# Patient Record
Sex: FEMALE | Race: WHITE | NOT HISPANIC OR LATINO | Employment: OTHER | URBAN - METROPOLITAN AREA
[De-identification: names, ages, dates, MRNs, and addresses within clinical notes are randomized per-mention and may not be internally consistent; named-entity substitution may affect disease eponyms.]

---

## 2017-01-30 DIAGNOSIS — M81.0 OSTEOPOROSIS: Primary | ICD-10-CM

## 2017-01-31 LAB
BUN SERPL-MCNC: 21 MG/DL (ref 8–23)
BUN/CREAT SERPL: 28.8 (ref 7–25)
CALCIUM SERPL-MCNC: 9.7 MG/DL (ref 8.6–10.5)
CHLORIDE SERPL-SCNC: 100 MMOL/L (ref 98–107)
CO2 SERPL-SCNC: 32.4 MMOL/L (ref 22–29)
CREAT SERPL-MCNC: 0.73 MG/DL (ref 0.57–1)
GLUCOSE SERPL-MCNC: 236 MG/DL (ref 65–99)
POTASSIUM SERPL-SCNC: 4.4 MMOL/L (ref 3.5–5.2)
SODIUM SERPL-SCNC: 141 MMOL/L (ref 136–145)

## 2017-02-02 DIAGNOSIS — R73.01 IFG (IMPAIRED FASTING GLUCOSE): Primary | ICD-10-CM

## 2017-02-03 LAB
BUN SERPL-MCNC: 20 MG/DL (ref 8–27)
BUN/CREAT SERPL: 27 (ref 11–26)
CALCIUM SERPL-MCNC: 9.5 MG/DL (ref 8.7–10.3)
CHLORIDE SERPL-SCNC: 101 MMOL/L (ref 96–106)
CO2 SERPL-SCNC: 26 MMOL/L (ref 18–29)
CREAT SERPL-MCNC: 0.75 MG/DL (ref 0.57–1)
GLUCOSE SERPL-MCNC: 109 MG/DL (ref 65–99)
HBA1C MFR BLD: 5.9 % (ref 4.8–5.6)
POTASSIUM SERPL-SCNC: 4.2 MMOL/L (ref 3.5–5.2)
SODIUM SERPL-SCNC: 144 MMOL/L (ref 134–144)

## 2017-02-08 DIAGNOSIS — M81.0 OSTEOPOROSIS: Primary | ICD-10-CM

## 2017-02-09 ENCOUNTER — HOSPITAL ENCOUNTER (OUTPATIENT)
Dept: INFUSION THERAPY | Facility: HOSPITAL | Age: 82
Discharge: HOME | End: 2017-02-09
Admitting: INTERNAL MEDICINE

## 2017-02-09 VITALS
RESPIRATION RATE: 20 BRPM | HEIGHT: 64 IN | HEART RATE: 77 BPM | DIASTOLIC BLOOD PRESSURE: 55 MMHG | WEIGHT: 109 LBS | TEMPERATURE: 97.3 F | OXYGEN SATURATION: 97 % | BODY MASS INDEX: 18.61 KG/M2 | SYSTOLIC BLOOD PRESSURE: 131 MMHG

## 2017-02-09 DIAGNOSIS — M81.0 OSTEOPOROSIS: ICD-10-CM

## 2017-02-09 PROCEDURE — 25010000002 DENOSUMAB 60 MG/ML SOLUTION: Performed by: INTERNAL MEDICINE

## 2017-02-09 PROCEDURE — 96401 CHEMO ANTI-NEOPL SQ/IM: CPT

## 2017-02-09 RX ADMIN — DENOSUMAB 60 MG: 60 INJECTION SUBCUTANEOUS at 14:49

## 2017-02-09 NOTE — PATIENT INSTRUCTIONS
Denosumab injection  What is this medicine?  DENOSUMAB (den oh zeyad mab) slows bone breakdown. Prolia is used to treat osteoporosis in women after menopause and in men. Xgeva is used to prevent bone fractures and other bone problems caused by cancer bone metastases. Xgeva is also used to treat giant cell tumor of the bone.  This medicine may be used for other purposes; ask your health care provider or pharmacist if you have questions.  What should I tell my health care provider before I take this medicine?  They need to know if you have any of these conditions:  -dental disease  -eczema  -infection or history of infections  -kidney disease or on dialysis  -low blood calcium or vitamin D  -malabsorption syndrome  -scheduled to have surgery or tooth extraction  -taking medicine that contains denosumab  -thyroid or parathyroid disease  -an unusual reaction to denosumab, other medicines, foods, dyes, or preservatives  -pregnant or trying to get pregnant  -breast-feeding  How should I use this medicine?  This medicine is for injection under the skin. It is given by a health care professional in a hospital or clinic setting.  If you are getting Prolia, a special MedGuide will be given to you by the pharmacist with each prescription and refill. Be sure to read this information carefully each time.  For Prolia, talk to your pediatrician regarding the use of this medicine in children. Special care may be needed. For Xgeva, talk to your pediatrician regarding the use of this medicine in children. While this drug may be prescribed for children as young as 13 years for selected conditions, precautions do apply.  Overdosage: If you think you have taken too much of this medicine contact a poison control center or emergency room at once.  NOTE: This medicine is only for you. Do not share this medicine with others.  What if I miss a dose?  It is important not to miss your dose. Call your doctor or health care professional if you are  unable to keep an appointment.  What may interact with this medicine?  Do not take this medicine with any of the following medications:  -other medicines containing denosumab  This medicine may also interact with the following medications:  -medicines that suppress the immune system  -medicines that treat cancer  -steroid medicines like prednisone or cortisone  This list may not describe all possible interactions. Give your health care provider a list of all the medicines, herbs, non-prescription drugs, or dietary supplements you use. Also tell them if you smoke, drink alcohol, or use illegal drugs. Some items may interact with your medicine.  What should I watch for while using this medicine?  Visit your doctor or health care professional for regular checks on your progress. Your doctor or health care professional may order blood tests and other tests to see how you are doing.  Call your doctor or health care professional if you get a cold or other infection while receiving this medicine. Do not treat yourself. This medicine may decrease your body's ability to fight infection.  You should make sure you get enough calcium and vitamin D while you are taking this medicine, unless your doctor tells you not to. Discuss the foods you eat and the vitamins you take with your health care professional.  See your dentist regularly. Brush and floss your teeth as directed. Before you have any dental work done, tell your dentist you are receiving this medicine.  Do not become pregnant while taking this medicine or for 5 months after stopping it. Women should inform their doctor if they wish to become pregnant or think they might be pregnant. There is a potential for serious side effects to an unborn child. Talk to your health care professional or pharmacist for more information.  What side effects may I notice from receiving this medicine?  Side effects that you should report to your doctor or health care professional as soon as  possible:  -allergic reactions like skin rash, itching or hives, swelling of the face, lips, or tongue  -breathing problems  -chest pain  -fast, irregular heartbeat  -feeling faint or lightheaded, falls  -fever, chills, or any other sign of infection  -muscle spasms, tightening, or twitches  -numbness or tingling  -skin blisters or bumps, or is dry, peels, or red  -slow healing or unexplained pain in the mouth or jaw  -unusual bleeding or bruising  Side effects that usually do not require medical attention (Report these to your doctor or health care professional if they continue or are bothersome.):  -muscle pain  -stomach upset, gas  This list may not describe all possible side effects. Call your doctor for medical advice about side effects. You may report side effects to FDA at 8-125-FDA-6727.  Where should I keep my medicine?  This medicine is only given in a clinic, doctor's office, or other health care setting and will not be stored at home.  NOTE: This sheet is a summary. It may not cover all possible information. If you have questions about this medicine, talk to your doctor, pharmacist, or health care provider.     © 2016, Elsevier/Gold Standard. (2013-06-17 12:37:47)  Osteoporosis  Osteoporosis is the thinning and loss of density in the bones. Osteoporosis makes the bones more brittle, fragile, and likely to break (fracture). Over time, osteoporosis can cause the bones to become so weak that they fracture after a simple fall. The bones most likely to fracture are the bones in the hip, wrist, and spine.  CAUSES   The exact cause is not known.  RISK FACTORS  Anyone can develop osteoporosis. You may be at greater risk if you have a family history of the condition or have poor nutrition. You may also have a higher risk if you are:   · Female.    · 50 years old or older.  · A smoker.  · Not physically active.    · White or .  · Slender.  SIGNS AND SYMPTOMS   A fracture might be the first sign of the  disease, especially if it results from a fall or injury that would not usually cause a bone to break. Other signs and symptoms include:   · Low back and neck pain.  · Stooped posture.  · Height loss.  DIAGNOSIS   To make a diagnosis, your health care provider may:  · Take a medical history.  · Perform a physical exam.  · Order tests, such as:    A bone mineral density test.    A dual-energy X-ray absorptiometry test.  TREATMENT   The goal of osteoporosis treatment is to strengthen your bones to reduce your risk of a fracture. Treatment may involve:  · Making lifestyle changes, such as:    Eating a diet rich in calcium.    Doing weight-bearing and muscle-strengthening exercises.    Stopping tobacco use.    Limiting alcohol intake.  · Taking medicine to slow the process of bone loss or to increase bone density.  · Monitoring your levels of calcium and vitamin D.  HOME CARE INSTRUCTIONS  · Include calcium and vitamin D in your diet. Calcium is important for bone health, and vitamin D helps the body absorb calcium.  · Perform weight-bearing and muscle-strengthening exercises as directed by your health care provider.  · Do not use any tobacco products, including cigarettes, chewing tobacco, and electronic cigarettes. If you need help quitting, ask your health care provider.  · Limit your alcohol intake.  · Take medicines only as directed by your health care provider.  · Keep all follow-up visits as directed by your health care provider. This is important.  · Take precautions at home to lower your risk of falling, such as:    Keeping rooms well lit and clutter free.    Installing safety rails on stairs.    Using rubber mats in the bathroom and other areas that are often wet or slippery.  SEEK IMMEDIATE MEDICAL CARE IF:   You fall or injure yourself.      This information is not intended to replace advice given to you by your health care provider. Make sure you discuss any questions you have with your health care  provider.     Document Released: 09/27/2006 Document Revised: 01/08/2016 Document Reviewed: 05/28/2015  [employer] Interactive Patient Education ©2016 [employer].

## 2017-03-01 DIAGNOSIS — R73.01 IFG (IMPAIRED FASTING GLUCOSE): ICD-10-CM

## 2017-03-01 DIAGNOSIS — M81.0 OSTEOPOROSIS: ICD-10-CM

## 2017-03-01 DIAGNOSIS — Z00.00 HEALTHCARE MAINTENANCE: Primary | ICD-10-CM

## 2017-03-07 LAB
25(OH)D3+25(OH)D2 SERPL-MCNC: 31.1 NG/ML (ref 30–100)
ALBUMIN SERPL-MCNC: 4.1 G/DL (ref 3.5–5.2)
ALBUMIN/GLOB SERPL: 1.5 G/DL
ALP SERPL-CCNC: 67 U/L (ref 39–117)
ALT SERPL-CCNC: 20 U/L (ref 1–33)
APPEARANCE UR: CLEAR
AST SERPL-CCNC: 22 U/L (ref 1–32)
BACTERIA #/AREA URNS HPF: NORMAL /HPF
BASOPHILS # BLD AUTO: 0.03 10*3/MM3 (ref 0–0.2)
BASOPHILS NFR BLD AUTO: 0.5 % (ref 0–1.5)
BILIRUB SERPL-MCNC: 0.5 MG/DL (ref 0.1–1.2)
BILIRUB UR QL STRIP: NEGATIVE
BUN SERPL-MCNC: 19 MG/DL (ref 8–23)
BUN/CREAT SERPL: 26 (ref 7–25)
CALCIUM SERPL-MCNC: 9.7 MG/DL (ref 8.6–10.5)
CHLORIDE SERPL-SCNC: 100 MMOL/L (ref 98–107)
CO2 SERPL-SCNC: 31 MMOL/L (ref 22–29)
COLOR UR: YELLOW
CREAT SERPL-MCNC: 0.73 MG/DL (ref 0.57–1)
EOSINOPHIL # BLD AUTO: 0.18 10*3/MM3 (ref 0–0.7)
EOSINOPHIL NFR BLD AUTO: 2.9 % (ref 0.3–6.2)
EPI CELLS #/AREA URNS HPF: NORMAL /HPF
ERYTHROCYTE [DISTWIDTH] IN BLOOD BY AUTOMATED COUNT: 14.1 % (ref 11.7–13)
GLOBULIN SER CALC-MCNC: 2.8 GM/DL
GLUCOSE SERPL-MCNC: 113 MG/DL (ref 65–99)
GLUCOSE UR QL: NEGATIVE
HBA1C MFR BLD: 5.8 % (ref 4.8–5.6)
HCT VFR BLD AUTO: 45.9 % (ref 35.6–45.5)
HGB BLD-MCNC: 14.7 G/DL (ref 11.9–15.5)
HGB UR QL STRIP: NEGATIVE
IMM GRANULOCYTES # BLD: 0 10*3/MM3 (ref 0–0.03)
IMM GRANULOCYTES NFR BLD: 0 % (ref 0–0.5)
KETONES UR QL STRIP: NEGATIVE
LEUKOCYTE ESTERASE UR QL STRIP: NEGATIVE
LYMPHOCYTES # BLD AUTO: 1.52 10*3/MM3 (ref 0.9–4.8)
LYMPHOCYTES NFR BLD AUTO: 24.9 % (ref 19.6–45.3)
MCH RBC QN AUTO: 30.2 PG (ref 26.9–32)
MCHC RBC AUTO-ENTMCNC: 32 G/DL (ref 32.4–36.3)
MCV RBC AUTO: 94.4 FL (ref 80.5–98.2)
MICRO URNS: NORMAL
MICRO URNS: NORMAL
MONOCYTES # BLD AUTO: 0.53 10*3/MM3 (ref 0.2–1.2)
MONOCYTES NFR BLD AUTO: 8.7 % (ref 5–12)
MUCOUS THREADS URNS QL MICRO: PRESENT /HPF
NEUTROPHILS # BLD AUTO: 3.85 10*3/MM3 (ref 1.9–8.1)
NEUTROPHILS NFR BLD AUTO: 63 % (ref 42.7–76)
NITRITE UR QL STRIP: NEGATIVE
PH UR STRIP: 6.5 [PH] (ref 5–7.5)
PLATELET # BLD AUTO: 226 10*3/MM3 (ref 140–500)
POTASSIUM SERPL-SCNC: 4.2 MMOL/L (ref 3.5–5.2)
PROT SERPL-MCNC: 6.9 G/DL (ref 6–8.5)
PROT UR QL STRIP: NEGATIVE
RBC # BLD AUTO: 4.86 10*6/MM3 (ref 3.9–5.2)
RBC #/AREA URNS HPF: NORMAL /HPF
SODIUM SERPL-SCNC: 142 MMOL/L (ref 136–145)
SP GR UR: 1.01 (ref 1–1.03)
URINALYSIS REFLEX: NORMAL
UROBILINOGEN UR STRIP-MCNC: 0.2 MG/DL (ref 0.2–1)
WBC # BLD AUTO: 6.11 10*3/MM3 (ref 4.5–10.7)
WBC #/AREA URNS HPF: NORMAL /HPF

## 2017-03-13 ENCOUNTER — OFFICE VISIT (OUTPATIENT)
Dept: INTERNAL MEDICINE | Facility: CLINIC | Age: 82
End: 2017-03-13

## 2017-03-13 VITALS
SYSTOLIC BLOOD PRESSURE: 115 MMHG | BODY MASS INDEX: 19.63 KG/M2 | RESPIRATION RATE: 12 BRPM | HEIGHT: 64 IN | OXYGEN SATURATION: 97 % | DIASTOLIC BLOOD PRESSURE: 70 MMHG | HEART RATE: 92 BPM | WEIGHT: 115 LBS

## 2017-03-13 DIAGNOSIS — Z00.00 HEALTHCARE MAINTENANCE: Primary | ICD-10-CM

## 2017-03-13 DIAGNOSIS — F41.1 GAD (GENERALIZED ANXIETY DISORDER): ICD-10-CM

## 2017-03-13 DIAGNOSIS — R09.89 GLOBUS SENSATION: ICD-10-CM

## 2017-03-13 DIAGNOSIS — L82.1 SEBORRHEIC KERATOSES: ICD-10-CM

## 2017-03-13 DIAGNOSIS — H04.123 DRY EYES, BILATERAL: ICD-10-CM

## 2017-03-13 DIAGNOSIS — F43.23 ADJUSTMENT DISORDER WITH MIXED ANXIETY AND DEPRESSED MOOD: ICD-10-CM

## 2017-03-13 DIAGNOSIS — J31.0 CHRONIC RHINITIS: ICD-10-CM

## 2017-03-13 DIAGNOSIS — M81.0 OSTEOPOROSIS: ICD-10-CM

## 2017-03-13 DIAGNOSIS — H91.93 HEARING LOSS, BILATERAL: ICD-10-CM

## 2017-03-13 DIAGNOSIS — N39.3 STRESS INCONTINENCE IN FEMALE: ICD-10-CM

## 2017-03-13 DIAGNOSIS — B35.1 ONYCHOMYCOSIS: ICD-10-CM

## 2017-03-13 DIAGNOSIS — R73.01 IFG (IMPAIRED FASTING GLUCOSE): ICD-10-CM

## 2017-03-13 DIAGNOSIS — L29.9 PRURITUS: ICD-10-CM

## 2017-03-13 DIAGNOSIS — L85.3 XEROSIS OF SKIN: ICD-10-CM

## 2017-03-13 PROCEDURE — G0439 PPPS, SUBSEQ VISIT: HCPCS | Performed by: INTERNAL MEDICINE

## 2017-03-13 PROCEDURE — 99214 OFFICE O/P EST MOD 30 MIN: CPT | Performed by: INTERNAL MEDICINE

## 2017-03-13 RX ORDER — AMMONIUM LACTATE 12 G/100G
LOTION TOPICAL AS NEEDED
Qty: 225 G | Refills: 1
Start: 2017-03-13 | End: 2019-05-13

## 2017-03-13 RX ORDER — FLUTICASONE PROPIONATE 50 MCG
2 SPRAY, SUSPENSION (ML) NASAL DAILY
Qty: 1 EACH | Refills: 1
Start: 2017-03-13 | End: 2017-04-12

## 2017-03-13 NOTE — PROGRESS NOTES
"Annual Exam (review of medical issues )      HPI  Cristy Wilde is a 82 y.o. female RTC in yearly AWV, review of medical issues:   1. Anxiety with element of adjustment d/o - remains on Lexapro. Pt has been sleeping well, so well that she was able to stop her melatonin.  \"I am sleeping much better. As soon as my head hits that pillow I am out\".  No use of Ativan.  2. Chronic rhinitis and globus sensation - sx improved after ENT eval showed no secondary etiology on nasal scope. However, pt notes is does still persist at this point. \"Nose runs so much, it is terrible\". Wonders if Restassis is contributing.   Not using any nasal sprays other than occasional saline.  Never use Flonase or related meds.   3. Onychomycossis - Rx for Kerydin topical at last visit, but pt never got Rx as \"I did not get in to all that.  Thinks has 2 \"dead toenails\"  And wants those looked at today.   4. Hearing Loss - on audiology eval noted at ENT office.  Does not think it has progressed. Children note to pt that she has issues with hearing. Pt is concerned about cost of hearing aides and finds the variety of options overwhelming.  5. Osteoporosis - is s/p 5 Prolia shots, last on 2/9/17.  On Ca++D daily.  No exercise but is active caring for  at NH.  6. Dry Eye Syndrome - on Restasis per optho. Feels like not seeing as well and plans to make appt with optho.   7. IFG - long hx with strong FHx of DMII. Is worried about this issue. Is only sibiling who does not have DM.  Wants to know if should have a blood sugar monitor.   8. Diffuse pruritis - long hx and feels like issue persists.  Predated Lexapro use. Q3 day Zyrtec still helps a bit.  Issue can occur random times on arms or legs or in scalp.       Review of Systems   Constitution: Negative for weight gain and weight loss.   HENT: Positive for hoarse voice (occasional with mucous noted). Negative for congestion, headaches and sore throat.    Eyes: Positive for blurred vision. " "Negative for discharge, double vision, pain and redness.        Plans upcoming optho appt.;  Wears glasses.    Cardiovascular: Negative for chest pain, dyspnea on exertion, leg swelling, near-syncope and syncope.   Respiratory: Negative for cough.    Skin: Positive for dry skin (\"it has just been awful. My skin is so dry\". ), itching (variable and diffuse. ) and suspicious lesions (on temple, lesion getting bigger, on L side. Had excised in past. ).   Musculoskeletal: Negative for arthritis, joint pain, joint swelling, muscle cramps and muscle weakness.   Gastrointestinal: Positive for change in bowel habit (in last month has had stool urgency with all loose or watery stool. Last PM was a bit formed by back to watery and loose this AM. ) and diarrhea (wonders if related to magnesium.  Started magnesium about the time of stool issue onset. ). Negative for abdominal pain, constipation (noted in past, long hx), heartburn, nausea and vomiting.   Genitourinary: Positive for bladder incontinence (progressive stress issues with less exercise). Negative for dysuria, frequency and hematuria.   Neurological: Negative for dizziness and light-headedness.   Psychiatric/Behavioral: Negative for depression. The patient is nervous/anxious. The patient does not have insomnia.        Problem List:    Patient Active Problem List   Diagnosis   • Chronic rhinitis   • Dry eyes   • Globus sensation   • Hearing loss   • Osteoporosis   • Adjustment disorder with mixed anxiety and depressed mood   • Onychomycosis   • Healthcare maintenance   • IFG (impaired fasting glucose)   • JESUS (generalized anxiety disorder)   • Pruritus   • Seborrheic keratoses   • Stress incontinence in female   • Xerosis of skin       Medical History:    Past Medical History   Diagnosis Date   • Chronic constipation    • Chronic rhinitis      with noted vasomotor rhinitis   • Diverticulosis    • Dry eyes      sees dr norton   • Globus sensation    • Hearing loss     " " noted on 2015 audiology testing   • History of allergic rhinitis    • History of onychomycosis    • Internal hemorrhoids      grade 2   • Laryngitis, chronic    • Osteoarthritis, chronic      minimal sx controlled with exercise   • Osteoporosis    • Pancreatitis due to biliary obstruction 2013     relieved after lap lacho   • Postpartum depression    • Prediabetes    • Reaction, adjustment, with anxious, depressed mood         Social History:    Social History     Social History   • Marital status:      Spouse name: N/A   • Number of children: 2   • Years of education: N/A     Occupational History   • Homemaker      Social History Main Topics   • Smoking status: Never Smoker   • Smokeless tobacco: Never Used   • Alcohol use Yes      Comment: < 1 drinks/ month   • Drug use: No   • Sexual activity: No      Comment:  only     Other Topics Concern   • Not on file     Social History Narrative    Diet - \"Try to make it as healthy as possible\"; eats fruits and veggies    Exercise - None       Family History:   Family History   Problem Relation Age of Onset   • Diabetes Mother    • Heart disease Mother    • Depression Father    • Parkinsonism Father    • Diabetes Sister    • Alzheimer's disease Brother    • Diabetes Brother    • Diabetes Maternal Grandmother    • Diabetes Brother    • Prostate cancer Brother    • Diabetes Sister        Surgical History:   Past Surgical History   Procedure Laterality Date   • Cataract extraction     • Cholecystectomy  2013   • Tonsillectomy  1957   • Dilation and curettage, diagnostic / therapeutic  1986   • Tear duct surgery       2007 R and 2014 L; Dr. Davis   • Cataract extraction bilateral w/ anterior vitrectomy  2009   • Refractive surgery       2015 L and 2016 R   • Eye ptosis repair Bilateral 2015         Current Outpatient Prescriptions:   •  B Complex Vitamins (VITAMIN B COMPLEX) tablet, Take  by mouth., Disp: , Rfl:   •  calcium-vitamin D (OSCAL) 250-125 MG-UNIT " per tablet, Take  by mouth., Disp: , Rfl:   •  cetirizine (ZyrTEC) 10 MG tablet, Take 10 mg by mouth daily., Disp: , Rfl:   •  denosumab (PROLIA) 60 MG/ML solution syringe, Inject  under the skin 1 (One) Time. EVERY 6 MONTHS, Disp: , Rfl:   •  escitalopram (LEXAPRO) 10 MG tablet, one pill PO daily., Disp: 90 tablet, Rfl: 3  •  MULTIPLE VITAMINS PO, Take  by mouth., Disp: , Rfl:   •  naproxen sodium (ALEVE) 220 MG tablet, Take 220 mg by mouth 2 (two) times a day as needed for mild pain (1-3)., Disp: , Rfl:   •  Omega-3 Fatty Acids (FISH OIL) 1200 MG capsule capsule, Take  by mouth., Disp: , Rfl:   •  ammonium lactate (AMLACTIN) 12 % lotion, Apply  topically As Needed for Dry Skin., Disp: 225 g, Rfl: 1  •  fluticasone (FLONASE) 50 MCG/ACT nasal spray, 2 sprays into each nostril Daily for 30 days. Administer 2 sprays in each nostril daily, Disp: 1 each, Rfl: 1    Vitals:    03/13/17 1318   BP: 115/70   Pulse: 92   Resp: 12   SpO2: 97%       Physical Exam   Constitutional: She is oriented to person, place, and time. She appears well-developed and well-nourished. She does not have a sickly appearance. She does not appear ill. No distress.   HENT:   Head: Normocephalic and atraumatic.   Right Ear: Hearing, tympanic membrane, external ear and ear canal normal.   Left Ear: Hearing, tympanic membrane, external ear and ear canal normal.   Nose: Nose normal.   Mouth/Throat: Uvula is midline, oropharynx is clear and moist and mucous membranes are normal.   Eyes: Conjunctivae, EOM and lids are normal. Pupils are equal, round, and reactive to light.   Neck: Trachea normal, normal range of motion and full passive range of motion without pain. Neck supple. Carotid bruit is not present. No thyroid mass and no thyromegaly present.   Cardiovascular: Normal rate, regular rhythm, S1 normal, S2 normal, normal heart sounds and intact distal pulses.  Exam reveals no gallop and no friction rub.    No murmur heard.  Pulses:       Radial  pulses are 2+ on the right side, and 2+ on the left side.        Dorsalis pedis pulses are 2+ on the right side, and 2+ on the left side.        Posterior tibial pulses are 2+ on the right side, and 2+ on the left side.   Pulmonary/Chest: Effort normal and breath sounds normal. No respiratory distress. She has no wheezes. She has no rhonchi. She has no rales. She exhibits no mass. Right breast exhibits no inverted nipple, no mass, no nipple discharge and no skin change. Left breast exhibits no inverted nipple, no mass, no nipple discharge and no skin change.   Chaperone present     Abdominal: Soft. Normal appearance and bowel sounds are normal. She exhibits no distension and no mass. There is no hepatosplenomegaly. There is no tenderness. There is no rebound and no guarding.   Genitourinary: Rectal exam shows external hemorrhoid (non-thrombosed). There is no rash, tenderness, lesion or injury on the right labia. There is no rash, tenderness, lesion or injury on the left labia.   Genitourinary Comments: Chaperone present     Musculoskeletal: Normal range of motion. She exhibits no edema.       Vascular Status -  Her exam exhibits right foot vasculature abnormal and no right foot edema. Her exam exhibits left foot vasculature abnormal and no left foot edema.  Lymphadenopathy:     She has no cervical adenopathy.     She has no axillary adenopathy.        Right: No inguinal adenopathy present.        Left: No inguinal adenopathy present.   Neurological: She is alert and oriented to person, place, and time. She has normal strength and normal reflexes. No cranial nerve deficit or sensory deficit.   Skin: Skin is warm and dry. No rash noted.        Psychiatric: She has a normal mood and affect. Her behavior is normal. Cognition and memory are normal.   Vitals reviewed.      Assessment/ Plan  Diagnoses and all orders for this visit:    Healthcare maintenance    Seborrheic keratoses    Pruritus  -     ammonium lactate  (AMLACTIN) 12 % lotion; Apply  topically As Needed for Dry Skin.    Osteoporosis    Onychomycosis    IFG (impaired fasting glucose)    Hearing loss, bilateral    Globus sensation    Dry eyes, bilateral    Chronic rhinitis  -     fluticasone (FLONASE) 50 MCG/ACT nasal spray; 2 sprays into each nostril Daily for 30 days. Administer 2 sprays in each nostril daily    Adjustment disorder with mixed anxiety and depressed mood    JESUS (generalized anxiety disorder)    Stress incontinence in female    Xerosis of skin  -     ammonium lactate (AMLACTIN) 12 % lotion; Apply  topically As Needed for Dry Skin.        Return in about 1 year (around 3/13/2018) for Annual physical.      Discussion:  Cristy Wilde is a 82 y.o. female RTC in yearly AWV, review of medical issues:   1. Anxiety with element of adjustment d/o - markedly improved sx on Lexapro, though still seems anxious. Pt perseverates to a degree on the stressors she is having with 's NH and [facility] admin office.  Regardless, pt is sleeping better an d is pleased with progress.    2. Chronic rhinitis and globus sensation - s/p ENT eval in past showing no secondary etiology on nasal scope.  Sx persist and I think we should at least try simple maneuvers like trial of Flonase daily.  C/W nasal saline rinses daily.   3. Onychomycossis - Rx for Kerydin topical at last visit, but pt never got Rx.  Pt prefers to stick with Gold Ascencio topical lotion as feels it is helping.   4. Hearing Loss - on audiology eval, declines aides at this time.   5. Osteoporosis - is s/p 5 Prolia shots, last on 2/9/17.  C/W Ca++D daily.  Repeat DEXA 3/2017.   6. Dry Eye Syndrome - on Restasis per optho.    7. IFG - A1C stable on labs, pt c/w TLC diet. D/W pt at length concept of carbs/ simple sugars and managing overall carb intake.  Monitor.  8. Diffuse pruritis with xerosis- predated Lexapro use. I really suspect this is dry skin related based on exam and I have asked pt to start AmLactin  cream BID.  Call if not better.  Labs do not reveal secondary cause and TSH OK in 2016.       9. Stress Incontinence - progressive as pt off exercise and Kegel's.  U/A clear. Restart Kegel's.   10. Seborrheic keratoses - on back, reassured pt.   11. HM - labs d/w pt: Flu/ Td/ Pvax/ Prevnar/ Zostavax - UTD; Mammo due, pt to schedule; Pap D/C'd, declines bimanual today; C-scope 2011 (-) --> due with hx polyps, pt agrees to schedule; DEXA as above; restart exercise for anxiety relief; Preventative care plan provided to pt at end of visit    RTC 1 year AWV, F labs prior

## 2017-03-13 NOTE — PATIENT INSTRUCTIONS
Medicare Wellness  Personal Prevention Plan of Service     Date of Office Visit:  2017  Encounter Provider:  Jerardo Kaur MD  Place of Service:  [facility] INTERNAL MEDICINE  Patient Name: Cristy Wilde  :  1934    As part of the Medicare Wellness portion of your visit today, we are providing you with this personalized preventive plan of services (PPPS). This plan is based upon recommendations of the [place] Preventive Services Task Force (USPSTF) and the Advisory Committee on Immunization Practices (ACIP).    This lists the preventive care services that should be considered, and provides dates of when you are due. Items listed as completed are up-to-date and do not require any further intervention.    Health Maintenance   Topic Date Due   • TDAP/TD VACCINES (1 - Tdap) 2018 (Originally 2008)   • PNEUMOCOCCAL VACCINES (65+ LOW/MEDIUM RISK) (2 of 2 - PPSV23) 2025 (Originally 11/15/2016)   • DXA SCAN  03/10/2018   • MEDICARE ANNUAL WELLNESS  2018   • MAMMOGRAM  2018   • INFLUENZA VACCINE  Completed   • ZOSTER VACCINE  Completed

## 2017-04-03 ENCOUNTER — TELEPHONE (OUTPATIENT)
Dept: INTERNAL MEDICINE | Facility: CLINIC | Age: 82
End: 2017-04-03

## 2017-04-03 NOTE — TELEPHONE ENCOUNTER
OK to trial 1/2 tablet. I worry this could be worse anxiety > side effect issue, but for now will see how she does on 1/2 tablet.

## 2017-04-03 NOTE — TELEPHONE ENCOUNTER
Pt called and stated that she is having sx on the lexapro. She has been very tired and shaky, she is wanting to know if she can take a half of a tablet.

## 2017-04-18 ENCOUNTER — TELEPHONE (OUTPATIENT)
Dept: INTERNAL MEDICINE | Facility: CLINIC | Age: 82
End: 2017-04-18

## 2017-04-18 DIAGNOSIS — M79.605 LEG PAIN, LEFT: Primary | ICD-10-CM

## 2017-05-17 ENCOUNTER — TELEPHONE (OUTPATIENT)
Dept: INTERNAL MEDICINE | Facility: CLINIC | Age: 82
End: 2017-05-17

## 2017-05-17 DIAGNOSIS — M54.50 CHRONIC MIDLINE LOW BACK PAIN WITHOUT SCIATICA: Primary | ICD-10-CM

## 2017-05-17 DIAGNOSIS — G89.29 CHRONIC MIDLINE LOW BACK PAIN WITHOUT SCIATICA: Primary | ICD-10-CM

## 2017-05-18 PROBLEM — G89.29 CHRONIC MIDLINE LOW BACK PAIN WITHOUT SCIATICA: Status: ACTIVE | Noted: 2017-05-18

## 2017-05-18 PROBLEM — M54.50 CHRONIC MIDLINE LOW BACK PAIN WITHOUT SCIATICA: Status: ACTIVE | Noted: 2017-05-18

## 2017-08-02 DIAGNOSIS — M81.0 OSTEOPOROSIS: Primary | ICD-10-CM

## 2017-08-05 LAB
BUN SERPL-MCNC: 21 MG/DL (ref 8–23)
BUN/CREAT SERPL: 26.9 (ref 7–25)
CALCIUM SERPL-MCNC: 10.3 MG/DL (ref 8.6–10.5)
CHLORIDE SERPL-SCNC: 103 MMOL/L (ref 98–107)
CO2 SERPL-SCNC: 29.5 MMOL/L (ref 22–29)
CREAT SERPL-MCNC: 0.78 MG/DL (ref 0.57–1)
GLUCOSE SERPL-MCNC: 161 MG/DL (ref 65–99)
POTASSIUM SERPL-SCNC: 4.2 MMOL/L (ref 3.5–5.2)
SODIUM SERPL-SCNC: 145 MMOL/L (ref 136–145)

## 2017-08-14 ENCOUNTER — HOSPITAL ENCOUNTER (OUTPATIENT)
Dept: INFUSION THERAPY | Facility: HOSPITAL | Age: 82
Discharge: HOME | End: 2017-08-14
Admitting: INTERNAL MEDICINE

## 2017-08-14 VITALS
OXYGEN SATURATION: 100 % | BODY MASS INDEX: 20.2 KG/M2 | DIASTOLIC BLOOD PRESSURE: 50 MMHG | RESPIRATION RATE: 16 BRPM | HEIGHT: 63 IN | HEART RATE: 69 BPM | WEIGHT: 114 LBS | TEMPERATURE: 95.4 F | SYSTOLIC BLOOD PRESSURE: 133 MMHG

## 2017-08-14 DIAGNOSIS — M81.0 OSTEOPOROSIS: ICD-10-CM

## 2017-08-14 PROCEDURE — 96372 THER/PROPH/DIAG INJ SC/IM: CPT

## 2017-08-14 PROCEDURE — 25010000002 DENOSUMAB 60 MG/ML SOLUTION: Performed by: INTERNAL MEDICINE

## 2017-08-14 RX ORDER — CYCLOSPORINE 0.5 MG/ML
1 EMULSION OPHTHALMIC 2 TIMES DAILY
COMMUNITY

## 2017-08-14 RX ORDER — LORATADINE 10 MG/1
1 CAPSULE, LIQUID FILLED ORAL DAILY
COMMUNITY
End: 2019-05-13

## 2017-08-14 RX ORDER — CHOLECALCIFEROL (VITAMIN D3) 125 MCG
5 CAPSULE ORAL NIGHTLY
COMMUNITY
End: 2018-02-15

## 2017-08-14 RX ORDER — MULTIVITAMIN WITH IRON
1 TABLET ORAL NIGHTLY
COMMUNITY

## 2017-08-14 RX ADMIN — DENOSUMAB 60 MG: 60 INJECTION SUBCUTANEOUS at 14:02

## 2017-08-14 NOTE — PATIENT INSTRUCTIONS
Denosumab injection  What is this medicine?  DENOSUMAB (den oh zeyad mab) slows bone breakdown. Prolia is used to treat osteoporosis in women after menopause and in men. Xgeva is used to prevent bone fractures and other bone problems caused by cancer bone metastases. Xgeva is also used to treat giant cell tumor of the bone.  This medicine may be used for other purposes; ask your health care provider or pharmacist if you have questions.  COMMON BRAND NAME(S): Prolia, XGEVA  What should I tell my health care provider before I take this medicine?  They need to know if you have any of these conditions:  -dental disease  -eczema  -infection or history of infections  -kidney disease or on dialysis  -low blood calcium or vitamin D  -malabsorption syndrome  -scheduled to have surgery or tooth extraction  -taking medicine that contains denosumab  -thyroid or parathyroid disease  -an unusual reaction to denosumab, other medicines, foods, dyes, or preservatives  -pregnant or trying to get pregnant  -breast-feeding  How should I use this medicine?  This medicine is for injection under the skin. It is given by a health care professional in a hospital or clinic setting.  If you are getting Prolia, a special MedGuide will be given to you by the pharmacist with each prescription and refill. Be sure to read this information carefully each time.  For Prolia, talk to your pediatrician regarding the use of this medicine in children. Special care may be needed. For Xgeva, talk to your pediatrician regarding the use of this medicine in children. While this drug may be prescribed for children as young as 13 years for selected conditions, precautions do apply.  Overdosage: If you think you have taken too much of this medicine contact a poison control center or emergency room at once.  NOTE: This medicine is only for you. Do not share this medicine with others.  What if I miss a dose?  It is important not to miss your dose. Call your doctor  or health care professional if you are unable to keep an appointment.  What may interact with this medicine?  Do not take this medicine with any of the following medications:  -other medicines containing denosumab  This medicine may also interact with the following medications:  -medicines that suppress the immune system  -medicines that treat cancer  -steroid medicines like prednisone or cortisone  This list may not describe all possible interactions. Give your health care provider a list of all the medicines, herbs, non-prescription drugs, or dietary supplements you use. Also tell them if you smoke, drink alcohol, or use illegal drugs. Some items may interact with your medicine.  What should I watch for while using this medicine?  Visit your doctor or health care professional for regular checks on your progress. Your doctor or health care professional may order blood tests and other tests to see how you are doing.  Call your doctor or health care professional if you get a cold or other infection while receiving this medicine. Do not treat yourself. This medicine may decrease your body's ability to fight infection.  You should make sure you get enough calcium and vitamin D while you are taking this medicine, unless your doctor tells you not to. Discuss the foods you eat and the vitamins you take with your health care professional.  See your dentist regularly. Brush and floss your teeth as directed. Before you have any dental work done, tell your dentist you are receiving this medicine.  Do not become pregnant while taking this medicine or for 5 months after stopping it. Women should inform their doctor if they wish to become pregnant or think they might be pregnant. There is a potential for serious side effects to an unborn child. Talk to your health care professional or pharmacist for more information.  What side effects may I notice from receiving this medicine?  Side effects that you should report to your doctor  or health care professional as soon as possible:  -allergic reactions like skin rash, itching or hives, swelling of the face, lips, or tongue  -breathing problems  -chest pain  -fast, irregular heartbeat  -feeling faint or lightheaded, falls  -fever, chills, or any other sign of infection  -muscle spasms, tightening, or twitches  -numbness or tingling  -skin blisters or bumps, or is dry, peels, or red  -slow healing or unexplained pain in the mouth or jaw  -unusual bleeding or bruising  Side effects that usually do not require medical attention (report to your doctor or health care professional if they continue or are bothersome):  -muscle pain  -stomach upset, gas  This list may not describe all possible side effects. Call your doctor for medical advice about side effects. You may report side effects to FDA at 5-747-FDA-4009.  Where should I keep my medicine?  This medicine is only given in a clinic, doctor's office, or other health care setting and will not be stored at home.  NOTE: This sheet is a summary. It may not cover all possible information. If you have questions about this medicine, talk to your doctor, pharmacist, or health care provider.     © 2017, [employer]. (2017-01-19 10:06:55)

## 2017-09-15 ENCOUNTER — TELEPHONE (OUTPATIENT)
Dept: INTERNAL MEDICINE | Facility: CLINIC | Age: 82
End: 2017-09-15

## 2017-09-15 DIAGNOSIS — H83.2X9 VESTIBULAR DISEQUILIBRIUM, UNSPECIFIED LATERALITY: Primary | ICD-10-CM

## 2017-09-15 NOTE — TELEPHONE ENCOUNTER
Patient is having vestibular issues and would like therapy. Please fax order to [facility] [phone].

## 2017-10-23 ENCOUNTER — TELEPHONE (OUTPATIENT)
Dept: INTERNAL MEDICINE | Facility: CLINIC | Age: 82
End: 2017-10-23

## 2017-10-23 NOTE — TELEPHONE ENCOUNTER
Cristy called asking if she should get the [employer] [facility] Screenings. She gets information in mail and did not know if you felt it was necessary.     Per Dr Kaur he does not feel necessary.  He reviews everything and other appropriate testing at her Yearly exam.

## 2017-12-19 DIAGNOSIS — F41.9 ANXIETY: ICD-10-CM

## 2017-12-19 DIAGNOSIS — F43.23 ADJUSTMENT DISORDER WITH MIXED ANXIETY AND DEPRESSED MOOD: ICD-10-CM

## 2017-12-20 RX ORDER — ESCITALOPRAM OXALATE 10 MG/1
TABLET ORAL
Qty: 90 TABLET | Refills: 1 | Status: SHIPPED | OUTPATIENT
Start: 2017-12-20 | End: 2018-04-11 | Stop reason: SDUPTHER

## 2018-02-06 DIAGNOSIS — M81.0 OSTEOPOROSIS, UNSPECIFIED OSTEOPOROSIS TYPE, UNSPECIFIED PATHOLOGICAL FRACTURE PRESENCE: Primary | ICD-10-CM

## 2018-02-08 LAB
BUN SERPL-MCNC: 23 MG/DL (ref 8–23)
BUN/CREAT SERPL: 30.3 (ref 7–25)
CALCIUM SERPL-MCNC: 9.2 MG/DL (ref 8.6–10.5)
CHLORIDE SERPL-SCNC: 99 MMOL/L (ref 98–107)
CO2 SERPL-SCNC: 30 MMOL/L (ref 22–29)
CREAT SERPL-MCNC: 0.76 MG/DL (ref 0.57–1)
GFR SERPLBLD CREATININE-BSD FMLA CKD-EPI: 73 ML/MIN/1.73
GFR SERPLBLD CREATININE-BSD FMLA CKD-EPI: 88 ML/MIN/1.73
GLUCOSE SERPL-MCNC: 204 MG/DL (ref 65–99)
POTASSIUM SERPL-SCNC: 3.9 MMOL/L (ref 3.5–5.2)
SODIUM SERPL-SCNC: 140 MMOL/L (ref 136–145)

## 2018-02-13 DIAGNOSIS — M81.0 OSTEOPOROSIS, UNSPECIFIED OSTEOPOROSIS TYPE, UNSPECIFIED PATHOLOGICAL FRACTURE PRESENCE: Primary | ICD-10-CM

## 2018-02-14 PROBLEM — M81.0 OSTEOPOROSIS: Status: ACTIVE | Noted: 2018-02-14

## 2018-02-15 ENCOUNTER — HOSPITAL ENCOUNTER (OUTPATIENT)
Dept: INFUSION THERAPY | Facility: HOSPITAL | Age: 83
Discharge: HOME | End: 2018-02-15
Admitting: INTERNAL MEDICINE

## 2018-02-15 VITALS
DIASTOLIC BLOOD PRESSURE: 68 MMHG | OXYGEN SATURATION: 98 % | RESPIRATION RATE: 16 BRPM | HEART RATE: 80 BPM | TEMPERATURE: 97.5 F | SYSTOLIC BLOOD PRESSURE: 117 MMHG

## 2018-02-15 DIAGNOSIS — M80.00XA AGE-RELATED OSTEOPOROSIS WITH CURRENT PATHOLOGICAL FRACTURE, INITIAL ENCOUNTER: ICD-10-CM

## 2018-02-15 PROCEDURE — 25010000002 DENOSUMAB 60 MG/ML SOLUTION: Performed by: INTERNAL MEDICINE

## 2018-02-15 PROCEDURE — 96372 THER/PROPH/DIAG INJ SC/IM: CPT

## 2018-02-15 RX ADMIN — DENOSUMAB 60 MG: 60 INJECTION SUBCUTANEOUS at 13:32

## 2018-03-27 DIAGNOSIS — R73.01 IFG (IMPAIRED FASTING GLUCOSE): ICD-10-CM

## 2018-03-27 DIAGNOSIS — B35.1 ONYCHOMYCOSIS: ICD-10-CM

## 2018-03-27 DIAGNOSIS — Z00.00 HEALTHCARE MAINTENANCE: Primary | ICD-10-CM

## 2018-03-27 DIAGNOSIS — E78.89 LIPIDS ABNORMAL: ICD-10-CM

## 2018-03-27 DIAGNOSIS — M80.00XA AGE-RELATED OSTEOPOROSIS WITH CURRENT PATHOLOGICAL FRACTURE, INITIAL ENCOUNTER: ICD-10-CM

## 2018-03-30 LAB
25(OH)D3+25(OH)D2 SERPL-MCNC: 34.6 NG/ML (ref 30–100)
ALBUMIN SERPL-MCNC: 4.1 G/DL (ref 3.5–5.2)
ALBUMIN/GLOB SERPL: 1.6 G/DL
ALP SERPL-CCNC: 76 U/L (ref 39–117)
ALT SERPL-CCNC: 20 U/L (ref 1–33)
APPEARANCE UR: CLEAR
AST SERPL-CCNC: 21 U/L (ref 1–32)
BACTERIA #/AREA URNS HPF: NORMAL /HPF
BASOPHILS # BLD AUTO: 0.03 10*3/MM3 (ref 0–0.2)
BASOPHILS NFR BLD AUTO: 0.5 % (ref 0–1.5)
BILIRUB SERPL-MCNC: 0.4 MG/DL (ref 0.1–1.2)
BILIRUB UR QL STRIP: NEGATIVE
BUN SERPL-MCNC: 26 MG/DL (ref 8–23)
BUN/CREAT SERPL: 33.8 (ref 7–25)
CALCIUM SERPL-MCNC: 10 MG/DL (ref 8.6–10.5)
CHLORIDE SERPL-SCNC: 100 MMOL/L (ref 98–107)
CHOLEST SERPL-MCNC: 193 MG/DL (ref 0–200)
CO2 SERPL-SCNC: 29 MMOL/L (ref 22–29)
COLOR UR: YELLOW
CREAT SERPL-MCNC: 0.77 MG/DL (ref 0.57–1)
EOSINOPHIL # BLD AUTO: 0.18 10*3/MM3 (ref 0–0.7)
EOSINOPHIL NFR BLD AUTO: 2.9 % (ref 0.3–6.2)
EPI CELLS #/AREA URNS HPF: NORMAL /HPF
ERYTHROCYTE [DISTWIDTH] IN BLOOD BY AUTOMATED COUNT: 14.1 % (ref 11.7–13)
GFR SERPLBLD CREATININE-BSD FMLA CKD-EPI: 72 ML/MIN/1.73
GFR SERPLBLD CREATININE-BSD FMLA CKD-EPI: 87 ML/MIN/1.73
GLOBULIN SER CALC-MCNC: 2.6 GM/DL
GLUCOSE SERPL-MCNC: 117 MG/DL (ref 65–99)
GLUCOSE UR QL: NEGATIVE
HCT VFR BLD AUTO: 45.2 % (ref 35.6–45.5)
HDLC SERPL-MCNC: 73 MG/DL (ref 40–60)
HGB BLD-MCNC: 14.4 G/DL (ref 11.9–15.5)
HGB UR QL STRIP: NEGATIVE
IMM GRANULOCYTES # BLD: 0 10*3/MM3 (ref 0–0.03)
IMM GRANULOCYTES NFR BLD: 0 % (ref 0–0.5)
KETONES UR QL STRIP: NEGATIVE
LDLC SERPL CALC-MCNC: 108 MG/DL (ref 0–100)
LEUKOCYTE ESTERASE UR QL STRIP: NEGATIVE
LYMPHOCYTES # BLD AUTO: 1.53 10*3/MM3 (ref 0.9–4.8)
LYMPHOCYTES NFR BLD AUTO: 24.2 % (ref 19.6–45.3)
MCH RBC QN AUTO: 30.3 PG (ref 26.9–32)
MCHC RBC AUTO-ENTMCNC: 31.9 G/DL (ref 32.4–36.3)
MCV RBC AUTO: 95 FL (ref 80.5–98.2)
MICRO URNS: NORMAL
MICRO URNS: NORMAL
MONOCYTES # BLD AUTO: 0.63 10*3/MM3 (ref 0.2–1.2)
MONOCYTES NFR BLD AUTO: 10 % (ref 5–12)
MUCOUS THREADS URNS QL MICRO: PRESENT /HPF
NEUTROPHILS # BLD AUTO: 3.94 10*3/MM3 (ref 1.9–8.1)
NEUTROPHILS NFR BLD AUTO: 62.4 % (ref 42.7–76)
NITRITE UR QL STRIP: NEGATIVE
PH UR STRIP: 6.5 [PH] (ref 5–7.5)
PLATELET # BLD AUTO: 212 10*3/MM3 (ref 140–500)
POTASSIUM SERPL-SCNC: 4.2 MMOL/L (ref 3.5–5.2)
PROT SERPL-MCNC: 6.7 G/DL (ref 6–8.5)
PROT UR QL STRIP: NEGATIVE
RBC # BLD AUTO: 4.76 10*6/MM3 (ref 3.9–5.2)
RBC #/AREA URNS HPF: NORMAL /HPF
SODIUM SERPL-SCNC: 140 MMOL/L (ref 136–145)
SP GR UR: 1.01 (ref 1–1.03)
TRIGL SERPL-MCNC: 62 MG/DL (ref 0–150)
URINALYSIS REFLEX: NORMAL
UROBILINOGEN UR STRIP-MCNC: 0.2 MG/DL (ref 0.2–1)
VLDLC SERPL CALC-MCNC: 12.4 MG/DL (ref 5–40)
WBC # BLD AUTO: 6.31 10*3/MM3 (ref 4.5–10.7)
WBC #/AREA URNS HPF: NORMAL /HPF

## 2018-04-10 ENCOUNTER — OFFICE VISIT (OUTPATIENT)
Dept: INTERNAL MEDICINE | Facility: CLINIC | Age: 83
End: 2018-04-10

## 2018-04-10 VITALS
DIASTOLIC BLOOD PRESSURE: 70 MMHG | TEMPERATURE: 97.9 F | HEIGHT: 63 IN | WEIGHT: 115 LBS | SYSTOLIC BLOOD PRESSURE: 128 MMHG | BODY MASS INDEX: 20.38 KG/M2 | HEART RATE: 80 BPM | RESPIRATION RATE: 12 BRPM | OXYGEN SATURATION: 98 %

## 2018-04-10 DIAGNOSIS — F41.1 GAD (GENERALIZED ANXIETY DISORDER): ICD-10-CM

## 2018-04-10 DIAGNOSIS — Z12.31 ENCOUNTER FOR SCREENING MAMMOGRAM FOR MALIGNANT NEOPLASM OF BREAST: ICD-10-CM

## 2018-04-10 DIAGNOSIS — H04.123 DRY EYES: ICD-10-CM

## 2018-04-10 DIAGNOSIS — F43.23 ADJUSTMENT DISORDER WITH MIXED ANXIETY AND DEPRESSED MOOD: ICD-10-CM

## 2018-04-10 DIAGNOSIS — R73.01 IFG (IMPAIRED FASTING GLUCOSE): ICD-10-CM

## 2018-04-10 DIAGNOSIS — B35.1 ONYCHOMYCOSIS: ICD-10-CM

## 2018-04-10 DIAGNOSIS — L29.9 PRURITUS: ICD-10-CM

## 2018-04-10 DIAGNOSIS — Z00.00 HEALTHCARE MAINTENANCE: Primary | ICD-10-CM

## 2018-04-10 DIAGNOSIS — L82.1 SEBORRHEIC KERATOSES: ICD-10-CM

## 2018-04-10 DIAGNOSIS — M81.0 OSTEOPOROSIS, UNSPECIFIED OSTEOPOROSIS TYPE, UNSPECIFIED PATHOLOGICAL FRACTURE PRESENCE: ICD-10-CM

## 2018-04-10 DIAGNOSIS — J30.0 CHRONIC VASOMOTOR RHINITIS: ICD-10-CM

## 2018-04-10 DIAGNOSIS — N39.3 STRESS INCONTINENCE IN FEMALE: ICD-10-CM

## 2018-04-10 DIAGNOSIS — H91.93 BILATERAL HEARING LOSS, UNSPECIFIED HEARING LOSS TYPE: ICD-10-CM

## 2018-04-10 PROBLEM — M79.605 LEG PAIN, LEFT: Status: RESOLVED | Noted: 2017-04-18 | Resolved: 2018-04-10

## 2018-04-10 PROCEDURE — 99214 OFFICE O/P EST MOD 30 MIN: CPT | Performed by: INTERNAL MEDICINE

## 2018-04-10 PROCEDURE — G0439 PPPS, SUBSEQ VISIT: HCPCS | Performed by: INTERNAL MEDICINE

## 2018-04-10 NOTE — PATIENT INSTRUCTIONS
Check at pharmacy on Tdap (tetanus with the whooping cough booster) for coverage.   Ask about Shingrix (shingles vaccine) at pharmacy.     Medicare Wellness  Personal Prevention Plan of Service     Date of Office Visit:  04/10/2018  Encounter Provider:  Jerardo Kaur MD  Place of Service:  [facility]  Patient Name: Cristy Wilde  :  1934    As part of the Medicare Wellness portion of your visit today, we are providing you with this personalized preventive plan of services (PPPS). This plan is based upon recommendations of the [place] Preventive Services Task Force (USPSTF) and the Advisory Committee on Immunization Practices (ACIP).    This lists the preventive care services that should be considered, and provides dates of when you are due. Items listed as completed are up-to-date and do not require any further intervention.    Health Maintenance   Topic Date Due   • TDAP/TD VACCINES (1 - Tdap) 2008   • DXA SCAN  03/10/2018   • MAMMOGRAM  2018   • PNEUMOCOCCAL VACCINES (65+ LOW/MEDIUM RISK) (2 of 2 - PPSV23) 2025 (Originally 11/15/2016)   • MEDICARE ANNUAL WELLNESS  04/10/2019   • INFLUENZA VACCINE  Completed   • ZOSTER VACCINE  Completed       Orders Placed This Encounter   Procedures   • Mammo Screening Bilateral With CAD     Standing Status:   Future     Standing Expiration Date:   2019     Order Specific Question:   Reason for Exam:     Answer:   Screeening       Return in about 1 year (around 4/10/2019) for Annual physical.

## 2018-04-10 NOTE — PROGRESS NOTES
"Subjective     Chief Complaint   Patient presents with   • Annual Exam     review of medical issues        HPI:  Cristy Wilde is a 83 y.o. female RTC in yearly AWV, review of medical issues:   \"I think I am doing OK\".  Notes stress has been terrible but thinks is doing Ok with it. Has new  and feels like that is helping management of Medicaid issues with her .   1. Anxiety with element of adjustment d/o - on Lexapro and notes \"I think that is why I sleep well\".  Taking med daily, but tried 5mg daily and felt like needed a whole pill and went back to 10mg dosing.  Has some intermittent bowel issues, but have resolved. Will get some intermittent diarrhea and loose stools. \"I am fine now\".  Is exercising at [employer] 3x/ week.    2. Chronic rhinitis and globus sensation - s/p ENT eval in past showing no secondary etiology on nasal scope. Notes clear mucous is better and overall sx are better.  \"I can sing now\".  Notes mucous is less coating on vocal cords and is back to singing.    3. Hearing Loss - on audiology eval, declined aides in past but finally agreed, at children's urging, to get aides. Has some ear itching with them so does not use daily.   Got a topical oil to use in ears, not sure if has helped or now.   Notes it was note helpful, \"they drive me goofy, I just itch\".    4. Osteoporosis - is s/p 7 Prolia shots, on Ca++D daily.  Feels like doing well, other than itching after shot.   5. Dry Eye Syndrome - on Restasis per optho.  Still working for pt.  \"I think it would be worse if I did not have it\". Has upcoming appt, sees once yearly.   6. IFG - A1C stable on labs, pt c/w TLC diet. Notes in past. Pt is exercising 3x/ week now and doing well.  Diet is overall good, but admits was not like it used to be when cooked for  and herself.  Is active in yard. 'I am still very active'.    7. Diffuse pruritis with xerosis- predated Lexapro use.  Noted it was better after Prolia shot.  Thinks that " "\"non-drowsy allergy pill\" OTC and really helps.  Itching gets worst after shot, 'oh I can tell'.          The following portions of the patient's history were reviewed and updated as appropriate: allergies, current medications, past family history, past medical history, past social history, past surgical history and problem list.    Review of Systems   Constitution: Negative for chills, fever, malaise/fatigue, weight gain and weight loss.   HENT: Positive for hoarse voice (largely improved. ). Negative for congestion, odynophagia and sore throat.    Eyes: Negative for discharge, double vision, pain and redness.        Last optho appt ~4/2018; wears glasses     Cardiovascular: Positive for leg swelling (ankles have been swelling, intermittent.  single e). Negative for chest pain, dyspnea on exertion, irregular heartbeat, near-syncope, palpitations and syncope.   Respiratory: Negative for cough and shortness of breath (at times feels like has it, but notes \"I got 90 miles an hour all the time. It has not affected me\". Does not limit at gym. ).    Skin: Negative for rash and suspicious lesions.   Musculoskeletal: Positive for neck pain (seems worse with sitting at computer. Going to chiropractor. ). Negative for joint pain, joint swelling, muscle cramps and muscle weakness.   Gastrointestinal: Positive for change in bowel habit (when upset or has stress), diarrhea (intermittent, with stress.) and heartburn (single event a few weeks ago, \"I havent had that in years\". ). Negative for bowel incontinence, constipation, nausea and vomiting.   Neurological: Positive for vertigo (single event recently and completed vestibular rehab with [facility].  Issue resolved. ). Negative for dizziness, headaches and light-headedness.   Psychiatric/Behavioral: Negative for depression. The patient is nervous/anxious (feels like is largely controlling. ). The patient does not have insomnia (does not get enough sleep volume. ).        Patient " Care Team:  Jerardo Kaur MD as PCP - General  Jerardo Kaur MD as PCP - Family Medicine  Jerardo Kaur MD as PCP - Claims Attributed  Albino Childs MD as Consulting Physician (Ophthalmology)  MAE Davis MD as Consulting Physician (Ophthalmology)    Recent Hospitalizations: No recent hospitalization(s).    Depression Screen:   PHQ-2/PHQ-9 Depression Screening 4/10/2018   Little interest or pleasure in doing things 1   Feeling down, depressed, or hopeless 1   Trouble falling or staying asleep, or sleeping too much 0   Feeling tired or having little energy 0   Poor appetite or overeating 0   Feeling bad about yourself - or that you are a failure or have let yourself or your family down 0   Trouble concentrating on things, such as reading the newspaper or watching television 1   Moving or speaking so slowly that other people could have noticed. Or the opposite - being so fidgety or restless that you have been moving around a lot more than usual 1   Thoughts that you would be better off dead, or of hurting yourself in some way 0   Total Score 4   If you checked off any problems, how difficult have these problems made it for you to do your work, take care of things at home, or get along with other people? Not difficult at all       Functional and Cognitive Screening:  Functional & Cognitive Status 4/10/2018   Do you have difficulty preparing food and eating? No   Do you have difficulty bathing yourself, getting dressed or grooming yourself? No   Do you have difficulty using the toilet? No   Do you have difficulty moving around from place to place? No   Do you have trouble with steps or getting out of a bed or a chair? No   In the past year have you fallen or experienced a near fall? No   Current Diet Well Balanced Diet   Dental Exam Up to date   Eye Exam Up to date   Exercise (times per week) 3 times per week   Current Exercise Activities Include Aerobics   Do you need help using the phone?  No   Are you  "deaf or do you have serious difficulty hearing?  No   Do you need help with transportation? No   Do you need help shopping? No   Do you need help preparing meals?  No   Do you need help with housework?  No   Do you need help with laundry? No   Do you need help taking your medications? No   Do you need help managing money? No   Do you ever drive or ride in a car without wearing a seat belt? No   Have you felt unusual stress, anger or loneliness in the last month? Yes   Who do you live with? Alone   If you need help, do you have trouble finding someone available to you? No   Have you been bothered in the last four weeks by sexual problems? No   Do you have difficulty concentrating, remembering or making decisions? No       Does the patient have evidence of cognitive impairment? no    Does not need ASA (and currently is not on it)    Vitals:    04/10/18 1300   BP: 128/70   Pulse: 80   Resp: 12   Temp: 97.9 °F (36.6 °C)   SpO2: 98%   Weight: 52.2 kg (115 lb)   Height: 160 cm (63\")   PainSc: 0-No pain       Body mass index is 20.37 kg/m².    Visual Acuity:  No exam data present    Advanced Care Planning:  has an advance directive - a copy HAS NOT been provided. Have asked the patient to send this to us to add to record.    Objective   Physical Exam   Constitutional: She is oriented to person, place, and time. She appears well-developed and well-nourished. No distress.   HENT:   Head: Normocephalic and atraumatic.   Right Ear: Tympanic membrane, external ear and ear canal normal. Decreased hearing is noted.   Left Ear: Tympanic membrane, external ear and ear canal normal. Decreased hearing is noted.   Nose: Nose normal.   Mouth/Throat: Uvula is midline, oropharynx is clear and moist and mucous membranes are normal.   Eyes: Conjunctivae, EOM and lids are normal. Pupils are equal, round, and reactive to light.   Neck: Trachea normal, normal range of motion and full passive range of motion without pain. Neck supple. Carotid " bruit is not present. No thyroid mass and no thyromegaly present.   Cardiovascular: Normal rate, regular rhythm, S1 normal, S2 normal, normal heart sounds and intact distal pulses.  Exam reveals no gallop and no friction rub.    No murmur heard.  Pulses:       Radial pulses are 2+ on the right side, and 2+ on the left side.        Dorsalis pedis pulses are 2+ on the right side, and 2+ on the left side.        Posterior tibial pulses are 2+ on the right side, and 2+ on the left side.   Small spider veins and small varicose veins noted on B legs.      Pulmonary/Chest: Effort normal and breath sounds normal. No respiratory distress. She has no wheezes. She has no rhonchi. She has no rales. She exhibits no mass. Right breast exhibits no inverted nipple, no mass, no nipple discharge and no skin change. Left breast exhibits no inverted nipple, no mass, no nipple discharge and no skin change.   Chaperone present   Abdominal: Soft. Normal appearance and bowel sounds are normal. She exhibits no distension and no mass. There is no hepatosplenomegaly. There is no tenderness. There is no rebound and no guarding.   Musculoskeletal: Normal range of motion. She exhibits edema (trace pitting B ankles).     Vascular Status -  Her right foot exhibits abnormal foot edema (trace in midfoot and ankle). Her right foot exhibits normal foot vasculature . Her left foot exhibits abnormal foot edema (trace in midfoot and ankle). Her left foot exhibits normal foot vasculature .  Skin Integrity  -  Her right foot skin is intact.Her left foot skin is intact..     Lymphadenopathy:     She has no cervical adenopathy.     She has no axillary adenopathy.        Right: No inguinal adenopathy present.        Left: No inguinal adenopathy present.   Neurological: She is alert and oriented to person, place, and time. She has normal strength and normal reflexes. She displays no tremor. No cranial nerve deficit or sensory deficit. She exhibits normal  muscle tone. Gait normal.   Skin: Skin is warm and dry. No rash noted.        Psychiatric: She has a normal mood and affect. Her behavior is normal. Judgment and thought content normal. Her mood appears not anxious. Her affect is not angry, not labile and not inappropriate. Her speech is not rapid and/or pressured, not tangential and not slurred. Cognition and memory are normal. Cognition and memory are not impaired. She does not express impulsivity or inappropriate judgment. She does not exhibit a depressed mood. She is communicative.   Pt has high strung personality and demonstrates this at visit, baseline for pt.    Vitals reviewed.      Assessment/Plan   Problems Addressed this Visit     Chronic rhinitis    Dry eyes    Hearing loss    Osteoporosis    Adjustment disorder with mixed anxiety and depressed mood    Onychomycosis    IFG (impaired fasting glucose)    JESUS (generalized anxiety disorder)    Pruritus    Seborrheic keratoses    Stress incontinence in female      Other Visit Diagnoses     Healthcare maintenance    -  Primary    Relevant Orders    Mammo Screening Bilateral With CAD    Encounter for screening mammogram for malignant neoplasm of breast         Relevant Orders    Mammo Screening Bilateral With CAD              Diagnoses and all orders for this visit:    Healthcare maintenance  -     Mammo Screening Bilateral With CAD; Future    Adjustment disorder with mixed anxiety and depressed mood    Chronic vasomotor rhinitis    Dry eyes    JESUS (generalized anxiety disorder)    Bilateral hearing loss, unspecified hearing loss type    IFG (impaired fasting glucose)    Onychomycosis    Osteoporosis, unspecified osteoporosis type, unspecified pathological fracture presence    Pruritus    Seborrheic keratoses    Stress incontinence in female    Encounter for screening mammogram for malignant neoplasm of breast   -     Mammo Screening Bilateral With CAD; Future        Cristy Wilde is a 83 y.o. female RTC in  yearly AWV, review of medical issues:    1. Anxiety with element of adjustment d/o -  Overall controlled on Lexapro and sleeping well. Still with a great deal of stress  in [place], but is managing well with new  and improved outlook.  C/W exercise 3x/week as key part of stress management.     2. Chronic rhinitis and globus sensation - s/p ENT eval in past showing no secondary etiology on nasal scope.Sx improved on Zyrtec overall.  Monitor.   3. Onychomycossis - now using Vicks Rub on toenails nightly and notes it is better.  Rec'd to add vinegar and water soaks 3x/ week or more in addition.   4. Hearing Loss - now has hearing aides that help.  Some itching as side effect of use in ear canals, but nothing noted on exam today. C/W topical oil use for prevention of itching.   5. Osteoporosis - is s/p 7 Prolia shots and tolerating.  C/W Ca++D daily.  Repeat DEXA due, will complete and f/u with pt via phone with results.   6. Dry Eye Syndrome - on Restasis per optho.    7. 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  Some sweets indiscretions in diet.  FBS is overall stable. Trend A1C with next labs.    8. Diffuse pruritis with xerosis- predated Lexapro use, thinks is worse after Prolia shot  Noted it was better after Prolia shot.  Pruritis is listed side effect of Prolia. Pt manging well with OTC anti-H and AmLactin. Labs have not been suggestive of other secondary cause.  Monitor.   9. Stress Incontinence - U/A clear. Add back Kegel's.   10. Seborrheic keratoses - noted on exam, reassured pt.   11. Venous stasis - progressive with mild dermatitis in legs and some ankle swelling. Pt recalls past pre-op eval for varicose veins but never did surgery. Pt declines compression at this time.    12. HM - labs d/w pt: Flu/ Td/ Pvax/ Prevnar/ Zostavax - UTD; Check at Rx on Tdap and Shingrix; Mammo due, ordered; Pap D/C'd, declined bimanual in past;  C-scope 2011 (-) --> due with hx polyps, pt to schedule; DEXA as above; c/w exercise; Preventative care plan provided to pt at end of visit    Return in about 1 year (around 4/10/2019) for Annual physical.          Current Outpatient Prescriptions:   •  ammonium lactate (AMLACTIN) 12 % lotion, Apply  topically As Needed for Dry Skin., Disp: 225 g, Rfl: 1  •  calcium-vitamin D (OSCAL) 250-125 MG-UNIT per tablet, Take  by mouth., Disp: , Rfl:   •  cetirizine (ZyrTEC) 10 MG tablet, Take 10 mg by mouth daily., Disp: , Rfl:   •  cycloSPORINE (RESTASIS) 0.05 % ophthalmic emulsion, Administer 1 drop to both eyes 2 (Two) Times a Day., Disp: , Rfl:   •  denosumab (PROLIA) 60 MG/ML solution syringe, Inject  under the skin 1 (One) Time. EVERY 6 MONTHS, Disp: , Rfl:   •  escitalopram (LEXAPRO) 10 MG tablet, TAKE ONE TABLET BY MOUTH DAILY, Disp: 90 tablet, Rfl: 1  •  Loratadine (CLARITIN) 10 MG capsule, Take 1 mg by mouth Daily., Disp: , Rfl:   •  Magnesium 250 MG tablet, Take 1 tablet by mouth Daily., Disp: , Rfl:   •  MULTIPLE VITAMINS PO, Take  by mouth., Disp: , Rfl:   •  Omega-3 Fatty Acids (FISH OIL) 1200 MG capsule capsule, Take  by mouth., Disp: , Rfl:   •  B Complex Vitamins (VITAMIN B COMPLEX) tablet, Take  by mouth., Disp: , Rfl:

## 2018-04-11 DIAGNOSIS — F41.9 ANXIETY: ICD-10-CM

## 2018-04-11 DIAGNOSIS — F43.23 ADJUSTMENT DISORDER WITH MIXED ANXIETY AND DEPRESSED MOOD: ICD-10-CM

## 2018-04-11 RX ORDER — ESCITALOPRAM OXALATE 10 MG/1
10 TABLET ORAL DAILY
Qty: 90 TABLET | Refills: 1 | Status: SHIPPED | OUTPATIENT
Start: 2018-04-11 | End: 2019-02-14 | Stop reason: SDUPTHER

## 2018-04-26 ENCOUNTER — HOSPITAL ENCOUNTER (OUTPATIENT)
Dept: MAMMOGRAPHY | Facility: HOSPITAL | Age: 83
Discharge: HOME | End: 2018-04-26
Admitting: INTERNAL MEDICINE

## 2018-04-26 DIAGNOSIS — Z00.00 HEALTHCARE MAINTENANCE: ICD-10-CM

## 2018-04-26 DIAGNOSIS — Z12.31 ENCOUNTER FOR SCREENING MAMMOGRAM FOR MALIGNANT NEOPLASM OF BREAST: ICD-10-CM

## 2018-04-26 PROCEDURE — 77067 SCR MAMMO BI INCL CAD: CPT

## 2018-05-03 ENCOUNTER — TELEPHONE (OUTPATIENT)
Dept: INTERNAL MEDICINE | Facility: CLINIC | Age: 83
End: 2018-05-03

## 2018-08-10 DIAGNOSIS — M81.0 OSTEOPOROSIS, UNSPECIFIED OSTEOPOROSIS TYPE, UNSPECIFIED PATHOLOGICAL FRACTURE PRESENCE: Primary | ICD-10-CM

## 2018-08-14 LAB
BUN SERPL-MCNC: 23 MG/DL (ref 8–23)
BUN/CREAT SERPL: 26.7 (ref 7–25)
CALCIUM SERPL-MCNC: 9.4 MG/DL (ref 8.6–10.5)
CHLORIDE SERPL-SCNC: 99 MMOL/L (ref 98–107)
CO2 SERPL-SCNC: 31.4 MMOL/L (ref 22–29)
CREAT SERPL-MCNC: 0.86 MG/DL (ref 0.57–1)
GLUCOSE SERPL-MCNC: 204 MG/DL (ref 65–99)
POTASSIUM SERPL-SCNC: 4.4 MMOL/L (ref 3.5–5.2)
SODIUM SERPL-SCNC: 142 MMOL/L (ref 136–145)

## 2018-08-17 DIAGNOSIS — M81.0 OSTEOPOROSIS, UNSPECIFIED OSTEOPOROSIS TYPE, UNSPECIFIED PATHOLOGICAL FRACTURE PRESENCE: Primary | ICD-10-CM

## 2018-08-21 ENCOUNTER — INFUSION (OUTPATIENT)
Dept: ONCOLOGY | Facility: HOSPITAL | Age: 83
End: 2018-08-21

## 2018-08-21 ENCOUNTER — APPOINTMENT (OUTPATIENT)
Dept: INFUSION THERAPY | Facility: HOSPITAL | Age: 83
End: 2018-08-21

## 2018-08-21 VITALS — WEIGHT: 109.6 LBS | TEMPERATURE: 97.7 F | BODY MASS INDEX: 19.41 KG/M2

## 2018-08-21 DIAGNOSIS — M80.00XA AGE-RELATED OSTEOPOROSIS WITH CURRENT PATHOLOGICAL FRACTURE, INITIAL ENCOUNTER: Primary | ICD-10-CM

## 2018-08-21 PROCEDURE — 25010000002 DENOSUMAB 60 MG/ML SOLUTION: Performed by: INTERNAL MEDICINE

## 2018-08-21 PROCEDURE — 96372 THER/PROPH/DIAG INJ SC/IM: CPT | Performed by: NURSE PRACTITIONER

## 2018-08-21 RX ADMIN — DENOSUMAB 60 MG: 60 INJECTION SUBCUTANEOUS at 13:33

## 2018-08-21 NOTE — PROGRESS NOTES
Arrived ambulatory for injection today. Labs and medications reviewed with patient . Prolia injection given per doctor order. Tolerated well. Instructed to call her doctor for any concerns and to schedule her next injection in 6 months. D/C home  ambulatory.

## 2018-09-11 ENCOUNTER — OFFICE VISIT (OUTPATIENT)
Dept: INTERNAL MEDICINE | Facility: CLINIC | Age: 83
End: 2018-09-11

## 2018-09-11 VITALS
DIASTOLIC BLOOD PRESSURE: 70 MMHG | HEIGHT: 63 IN | HEART RATE: 90 BPM | OXYGEN SATURATION: 98 % | TEMPERATURE: 98 F | BODY MASS INDEX: 19.49 KG/M2 | WEIGHT: 110 LBS | SYSTOLIC BLOOD PRESSURE: 128 MMHG

## 2018-09-11 DIAGNOSIS — L91.0 HYPERTROPHIC SCAR OF SKIN: ICD-10-CM

## 2018-09-11 DIAGNOSIS — R42 VERTIGO: Primary | ICD-10-CM

## 2018-09-11 PROCEDURE — 99214 OFFICE O/P EST MOD 30 MIN: CPT | Performed by: INTERNAL MEDICINE

## 2018-09-11 RX ORDER — MECLIZINE HYDROCHLORIDE 25 MG/1
25 TABLET ORAL 3 TIMES DAILY PRN
Qty: 30 TABLET | Refills: 1 | Status: SHIPPED | OUTPATIENT
Start: 2018-09-11 | End: 2019-05-13

## 2018-09-11 NOTE — PROGRESS NOTES
"bump on left leg and Dizziness      HPI  Cristy Wilde is a 83 y.o. female RTC In acute care:   1. Leg wound - cut leg in grocery store on cart.  Did bleed.  Initially healed up but has had hard nodule develop that is painful.  Has not gone away at times will have pain when lays down at night. No bleeding now.  SOmetimes gets red.  Is not getting better and not changing in size.  \"Something is not right in there\".  Tried BandAid and Vaseline on lesion at night and does not help.   2. \"Vertigo\" - \"my vertigo\". Recalls had \"terrible attack\" in past, years ago, with sudden onset of acute sx when out in the yard.  Resolved.  Recurred about 3 weeks ago when at home when getting up from computer. Sudden onset.  Caused intense nausea.  Son came over to help pt at time.  Sx improved after went to bad that night.  However, 3 days ago had some mild \"going in circles again\".  Felt better after drinking some water. No acute hearing loss or tinnitus (beyond baseline) with this event. Did not vomit.  Decided to come in here for eval due to recurrence.   Feels like in general balance is not good, will sometimes struggle at exercise class.  Thinks balance loss is not related to vertigo. Pt thinks is due to not drinking fluids when at NH 4-6 hours/ day with .        Review of Systems   Constitution: Negative for chills, fever and malaise/fatigue.   HENT: Positive for hearing loss (baseline).    Cardiovascular: Negative for near-syncope and syncope.   Skin: Positive for color change (at site L lower leg) and suspicious lesions (of L lower leg). Negative for rash.   Gastrointestinal: Positive for nausea (with vertigo).   Neurological: Positive for vertigo. Negative for headaches and light-headedness.       The following portions of the patient's history were reviewed and updated as appropriate: allergies, current medications, past medical history and problem list.      Current Outpatient Prescriptions:   •  ammonium lactate " "(AMLACTIN) 12 % lotion, Apply  topically As Needed for Dry Skin., Disp: 225 g, Rfl: 1  •  B Complex Vitamins (VITAMIN B COMPLEX) tablet, Take  by mouth., Disp: , Rfl:   •  calcium-vitamin D (OSCAL) 250-125 MG-UNIT per tablet, Take  by mouth., Disp: , Rfl:   •  cetirizine (ZyrTEC) 10 MG tablet, Take 10 mg by mouth daily., Disp: , Rfl:   •  cycloSPORINE (RESTASIS) 0.05 % ophthalmic emulsion, Administer 1 drop to both eyes 2 (Two) Times a Day., Disp: , Rfl:   •  denosumab (PROLIA) 60 MG/ML solution syringe, Inject  under the skin 1 (One) Time. EVERY 6 MONTHS, Disp: , Rfl:   •  escitalopram (LEXAPRO) 10 MG tablet, Take 1 tablet by mouth Daily., Disp: 90 tablet, Rfl: 1  •  Loratadine (CLARITIN) 10 MG capsule, Take 1 mg by mouth Daily., Disp: , Rfl:   •  Magnesium 250 MG tablet, Take 1 tablet by mouth Daily., Disp: , Rfl:   •  MULTIPLE VITAMINS PO, Take  by mouth., Disp: , Rfl:   •  Omega-3 Fatty Acids (FISH OIL) 1200 MG capsule capsule, Take  by mouth., Disp: , Rfl:   •  meclizine (ANTIVERT) 25 MG tablet, Take 1 tablet by mouth 3 (Three) Times a Day As Needed for dizziness., Disp: 30 tablet, Rfl: 1    Vitals:    09/11/18 1053   BP: 128/70   Pulse: 90   Temp: 98 °F (36.7 °C)   SpO2: 98%   Weight: 49.9 kg (110 lb)   Height: 160 cm (63\")     Orthostatics:   Lying  128/70  76   Sitting  120/60  78   Stand 118/60  78    Physical Exam   Constitutional: She is oriented to person, place, and time. She appears well-developed and well-nourished. No distress.   HENT:   Head: Normocephalic and atraumatic.   Right Ear: Tympanic membrane, external ear and ear canal normal.   Left Ear: Tympanic membrane, external ear and ear canal normal.   Eyes: Pupils are equal, round, and reactive to light. EOM are normal.   Neck: Normal range of motion. Neck supple.   Pulmonary/Chest: Effort normal. No respiratory distress.   Musculoskeletal: She exhibits no edema.   Lymphadenopathy:     She has no cervical adenopathy.   Neurological: She is alert " and oriented to person, place, and time. She has normal strength. She displays no tremor.   Josse-Hallpike (-) bilaterally  Head thrust negative B   Skin:        Psychiatric: She has a normal mood and affect. Her behavior is normal.   Vitals reviewed.      Assessment/ Plan  Diagnoses and all orders for this visit:    Vertigo  -     meclizine (ANTIVERT) 25 MG tablet; Take 1 tablet by mouth 3 (Three) Times a Day As Needed for dizziness.    Hypertrophic scar of skin  Comments:  with pain; L lower leg    Orders:  -     Ambulatory Referral to Dermatology        Return for Next scheduled follow up.      Discussion:  Cristy Wilde is a 83 y.o. female RTC In acute care (new issues to examiner):   1. L Leg wound with hypertrophy scar vs. SCC - refer to derm for eval +/- intralesional tx for pain issues. Referral placed.   2. Vertigo,recurrent - suspect BPPV.  Cannot reproduce on exam today.  Orthostatics negative.  I reviewed BPPV with pt and plan for meclizine PRN at sx onset and then start Epley maneuver.  Handout for Epley provided.     RTC as scheduled.

## 2019-02-13 DIAGNOSIS — M81.0 OSTEOPOROSIS, UNSPECIFIED OSTEOPOROSIS TYPE, UNSPECIFIED PATHOLOGICAL FRACTURE PRESENCE: Primary | ICD-10-CM

## 2019-02-14 DIAGNOSIS — F41.9 ANXIETY: ICD-10-CM

## 2019-02-14 DIAGNOSIS — F43.23 ADJUSTMENT DISORDER WITH MIXED ANXIETY AND DEPRESSED MOOD: ICD-10-CM

## 2019-02-15 LAB
BUN SERPL-MCNC: 21 MG/DL (ref 8–27)
BUN/CREAT SERPL: 28 (ref 12–28)
CALCIUM SERPL-MCNC: 10.3 MG/DL (ref 8.7–10.3)
CHLORIDE SERPL-SCNC: 100 MMOL/L (ref 96–106)
CO2 SERPL-SCNC: 28 MMOL/L (ref 20–29)
CREAT SERPL-MCNC: 0.75 MG/DL (ref 0.57–1)
GLUCOSE SERPL-MCNC: 98 MG/DL (ref 65–99)
POTASSIUM SERPL-SCNC: 4.3 MMOL/L (ref 3.5–5.2)
SODIUM SERPL-SCNC: 142 MMOL/L (ref 134–144)

## 2019-02-15 RX ORDER — ESCITALOPRAM OXALATE 10 MG/1
TABLET ORAL
Qty: 90 TABLET | Refills: 0 | Status: SHIPPED | OUTPATIENT
Start: 2019-02-15 | End: 2019-05-13

## 2019-02-20 ENCOUNTER — TELEPHONE (OUTPATIENT)
Dept: INTERNAL MEDICINE | Facility: CLINIC | Age: 84
End: 2019-02-20

## 2019-03-11 ENCOUNTER — INFUSION (OUTPATIENT)
Dept: ONCOLOGY | Facility: HOSPITAL | Age: 84
End: 2019-03-11

## 2019-03-11 VITALS
BODY MASS INDEX: 20.3 KG/M2 | HEART RATE: 81 BPM | SYSTOLIC BLOOD PRESSURE: 148 MMHG | DIASTOLIC BLOOD PRESSURE: 70 MMHG | OXYGEN SATURATION: 94 % | WEIGHT: 114.6 LBS | TEMPERATURE: 97.7 F

## 2019-03-11 DIAGNOSIS — M80.00XA AGE-RELATED OSTEOPOROSIS WITH CURRENT PATHOLOGICAL FRACTURE, INITIAL ENCOUNTER: Primary | ICD-10-CM

## 2019-03-11 PROCEDURE — 96372 THER/PROPH/DIAG INJ SC/IM: CPT | Performed by: NURSE PRACTITIONER

## 2019-03-11 PROCEDURE — 25010000002 DENOSUMAB 60 MG/ML SOLUTION: Performed by: INTERNAL MEDICINE

## 2019-03-11 RX ADMIN — DENOSUMAB 60 MG: 60 INJECTION SUBCUTANEOUS at 14:37

## 2019-03-11 NOTE — PROGRESS NOTES
Arrived  for prolia injection. Indication and side effects reviewed. Denies recent dental work. Labs and medications verified. Prolia administered in right arm without incidence. Instructed to call prescribing MD for any concerns or questions and instructed on how to schedule future appts.  Pt vu and discharged ambulatory.

## 2019-03-29 ENCOUNTER — OFFICE VISIT (OUTPATIENT)
Dept: INTERNAL MEDICINE | Facility: CLINIC | Age: 84
End: 2019-03-29

## 2019-03-29 VITALS
SYSTOLIC BLOOD PRESSURE: 122 MMHG | WEIGHT: 113 LBS | HEART RATE: 80 BPM | DIASTOLIC BLOOD PRESSURE: 70 MMHG | BODY MASS INDEX: 20.02 KG/M2 | HEIGHT: 63 IN | TEMPERATURE: 97.9 F | OXYGEN SATURATION: 99 %

## 2019-03-29 DIAGNOSIS — I83.893 VARICOSE VEINS OF BOTH LEGS WITH EDEMA: ICD-10-CM

## 2019-03-29 DIAGNOSIS — R60.0 BILATERAL LEG EDEMA: Primary | ICD-10-CM

## 2019-03-29 DIAGNOSIS — J01.90 ACUTE SINUSITIS, RECURRENCE NOT SPECIFIED, UNSPECIFIED LOCATION: ICD-10-CM

## 2019-03-29 PROCEDURE — 99214 OFFICE O/P EST MOD 30 MIN: CPT | Performed by: INTERNAL MEDICINE

## 2019-03-29 RX ORDER — SOD CHLOR,BICARB/SQUEEZ BOTTLE
1 PACKET, WITH RINSE DEVICE NASAL 3 TIMES DAILY
Qty: 1 EACH | Refills: 0
Start: 2019-03-29 | End: 2019-05-13

## 2019-03-29 NOTE — PROGRESS NOTES
"feet/ ankle swelling and chest congestion      HPI  Cristy Wilde is a 84 y.o. female RTC in acute care:   \"I think my ankles look swollen\".  Really only notices it at night when takes off socks.  Still there in AM. \"My feet feel strange\".  Has been periodic for a long time, but more daily in the last month.  No pain at ankles.  Swelling is really only ankles, equal on both sides.  Breathing OK, not SOA at all.   No heart palpitations noted.  Had one event at [employer] where was a bit overwhelmed when ran in through parking lot to [place] but sat down and resolved quickly.    Still carrying stress of caring for  who is in NH.      Review of Systems   Constitution: Negative for chills, fever and malaise/fatigue.   HENT: Positive for congestion (in last week, getting better). Negative for ear pain and sore throat (resolved).         Does note tie URI sx to ankle swelling.  Really is not taking meds for any URI sx.       Cardiovascular: Negative for chest pain, dyspnea on exertion, irregular heartbeat, leg swelling, near-syncope, palpitations and syncope.   Respiratory: Positive for cough (is better over week. ) and sputum production (blows and coughs some mucous for last week, started after sore throat one week ago. ). Negative for shortness of breath and wheezing.    Skin: Negative for rash and suspicious lesions.   Neurological: Negative for dizziness, headaches and light-headedness.       The following portions of the patient's history were reviewed and updated as appropriate: allergies, current medications, past medical history, past social history and problem list.      Current Outpatient Medications:   •  ammonium lactate (AMLACTIN) 12 % lotion, Apply  topically As Needed for Dry Skin., Disp: 225 g, Rfl: 1  •  B Complex Vitamins (VITAMIN B COMPLEX) tablet, Take  by mouth., Disp: , Rfl:   •  calcium-vitamin D (OSCAL) 250-125 MG-UNIT per tablet, Take  by mouth., Disp: , Rfl:   •  cetirizine (ZyrTEC) 10 MG " "tablet, Take 10 mg by mouth daily., Disp: , Rfl:   •  cycloSPORINE (RESTASIS) 0.05 % ophthalmic emulsion, Administer 1 drop to both eyes 2 (Two) Times a Day., Disp: , Rfl:   •  denosumab (PROLIA) 60 MG/ML solution syringe, Inject  under the skin 1 (One) Time. EVERY 6 MONTHS, Disp: , Rfl:   •  escitalopram (LEXAPRO) 10 MG tablet, TAKE ONE TABLET BY MOUTH DAILY, Disp: 90 tablet, Rfl: 0  •  Loratadine (CLARITIN) 10 MG capsule, Take 1 mg by mouth Daily., Disp: , Rfl:   •  Magnesium 250 MG tablet, Take 1 tablet by mouth Daily., Disp: , Rfl:   •  meclizine (ANTIVERT) 25 MG tablet, Take 1 tablet by mouth 3 (Three) Times a Day As Needed for dizziness., Disp: 30 tablet, Rfl: 1  •  MULTIPLE VITAMINS PO, Take  by mouth., Disp: , Rfl:   •  Omega-3 Fatty Acids (FISH OIL) 1200 MG capsule capsule, Take  by mouth., Disp: , Rfl:   •  Hypertonic Nasal Wash (SINUS RINSE BOTTLE KIT) pack, 1 bottle into the nostril(s) as directed by provider 3 (Three) Times a Day., Disp: 1 each, Rfl: 0    Vitals:    03/29/19 1431   BP: 122/70   Pulse: 80   Temp: 97.9 °F (36.6 °C)   SpO2: 99%   Weight: 51.3 kg (113 lb)   Height: 160 cm (63\")         Physical Exam   Constitutional: She is oriented to person, place, and time. She appears well-developed and well-nourished. No distress.   HENT:   Head: Normocephalic and atraumatic.   Mouth/Throat: Oropharynx is clear and moist. No oropharyngeal exudate.   Eyes: Pupils are equal, round, and reactive to light.   Neck: Normal range of motion. Neck supple. No JVD present.   Cardiovascular: Normal rate, regular rhythm and normal heart sounds.   Pulses:       Carotid pulses are 2+ on the right side, and 2+ on the left side.       Radial pulses are 2+ on the right side, and 2+ on the left side.   Small varicose veins/ spider veins in B legs   Pulmonary/Chest: Effort normal and breath sounds normal. No tachypnea. No respiratory distress. She has no wheezes. She has no rhonchi. She has no rales.   Musculoskeletal: " She exhibits edema (trace non-pitting in B ankles).        Right lower leg: She exhibits no tenderness, no bony tenderness, no swelling and no edema.        Left lower leg: She exhibits no tenderness, no bony tenderness, no swelling and no edema.   (-) Roxie's sign B     Lymphadenopathy:     She has no cervical adenopathy.   Neurological: She is alert and oriented to person, place, and time. No cranial nerve deficit. She exhibits normal muscle tone. Gait normal.   Skin: No rash (on legs) noted.   Psychiatric: She has a normal mood and affect. Her behavior is normal.   Vitals reviewed.      Assessment/ Plan  Diagnoses and all orders for this visit:    Bilateral leg edema  -     Comprehensive Metabolic Panel  -     TSH  -     UA / M With / Rflx Culture([facility] ONLY) - Urine, Clean Catch    Varicose veins of both legs with edema  -     Comprehensive Metabolic Panel  -     TSH  -     UA / M With / Rflx Culture([facility] ONLY) - Urine, Clean Catch    Acute sinusitis, recurrence not specified, unspecified location  -     Hypertonic Nasal Wash (SINUS RINSE BOTTLE KIT) pack; 1 bottle into the nostril(s) as directed by provider 3 (Three) Times a Day.        Return for Next scheduled follow up.      Discussion:  Cristy Wilde is a 84 y.o. female RTC in acute care (new issue to examiner) with multiple months of B ankle edema without pain.  Exam is notable for very mild edema in B ankles and scattered spider veins over legs. Pt has no associated sx and are edema is variable. No s/sx of CHF. No s /sx of DVT.  I really feel this is likely from venous backflow and pt is on feet a good deal caring for .  I have advised pt to wear compression as able to control. Will check protein levels in blood and TSH today.   Mild sinusitis - over last week. Suspect viral, resolving.  Add nasal saline TID.  Declines other meds.     RTC as scheduled.

## 2019-03-30 LAB
ALBUMIN SERPL-MCNC: 4.6 G/DL (ref 3.5–5.2)
ALBUMIN/GLOB SERPL: 2 G/DL
ALP SERPL-CCNC: 72 U/L (ref 39–117)
ALT SERPL-CCNC: 16 U/L (ref 1–33)
APPEARANCE UR: CLEAR
AST SERPL-CCNC: 14 U/L (ref 1–32)
BACTERIA #/AREA URNS HPF: NORMAL /HPF
BILIRUB SERPL-MCNC: 0.4 MG/DL (ref 0.2–1.2)
BILIRUB UR QL STRIP: NEGATIVE
BUN SERPL-MCNC: 20 MG/DL (ref 8–23)
BUN/CREAT SERPL: 28.6 (ref 7–25)
CALCIUM SERPL-MCNC: 9.7 MG/DL (ref 8.6–10.5)
CHLORIDE SERPL-SCNC: 95 MMOL/L (ref 98–107)
CO2 SERPL-SCNC: 31.2 MMOL/L (ref 22–29)
COLOR UR: YELLOW
CREAT SERPL-MCNC: 0.7 MG/DL (ref 0.57–1)
EPI CELLS #/AREA URNS HPF: NORMAL /HPF
GLOBULIN SER CALC-MCNC: 2.3 GM/DL
GLUCOSE SERPL-MCNC: 117 MG/DL (ref 65–99)
GLUCOSE UR QL: NEGATIVE
HGB UR QL STRIP: NEGATIVE
KETONES UR QL STRIP: NEGATIVE
LEUKOCYTE ESTERASE UR QL STRIP: NEGATIVE
MICRO URNS: NORMAL
MICRO URNS: NORMAL
NITRITE UR QL STRIP: NEGATIVE
PH UR STRIP: 7 [PH] (ref 5–7.5)
POTASSIUM SERPL-SCNC: 4 MMOL/L (ref 3.5–5.2)
PROT SERPL-MCNC: 6.9 G/DL (ref 6–8.5)
PROT UR QL STRIP: NEGATIVE
RBC #/AREA URNS HPF: NORMAL /HPF
SODIUM SERPL-SCNC: 136 MMOL/L (ref 136–145)
SP GR UR: 1.01 (ref 1–1.03)
TSH SERPL DL<=0.005 MIU/L-ACNC: 1.97 MIU/ML (ref 0.27–4.2)
URINALYSIS REFLEX: NORMAL
UROBILINOGEN UR STRIP-MCNC: 0.2 MG/DL (ref 0.2–1)
WBC #/AREA URNS HPF: NORMAL /HPF

## 2019-04-12 ENCOUNTER — TRANSCRIBE ORDERS (OUTPATIENT)
Dept: ADMINISTRATIVE | Facility: HOSPITAL | Age: 84
End: 2019-04-12

## 2019-04-12 DIAGNOSIS — Z12.39 SCREENING BREAST EXAMINATION: Primary | ICD-10-CM

## 2019-05-07 ENCOUNTER — HOSPITAL ENCOUNTER (OUTPATIENT)
Dept: MAMMOGRAPHY | Facility: HOSPITAL | Age: 84
Discharge: HOME | End: 2019-05-07
Admitting: INTERNAL MEDICINE

## 2019-05-07 DIAGNOSIS — Z12.39 SCREENING BREAST EXAMINATION: ICD-10-CM

## 2019-05-07 PROCEDURE — 77067 SCR MAMMO BI INCL CAD: CPT

## 2019-05-07 PROCEDURE — 77063 BREAST TOMOSYNTHESIS BI: CPT

## 2019-05-13 ENCOUNTER — APPOINTMENT (OUTPATIENT)
Dept: GENERAL RADIOLOGY | Facility: HOSPITAL | Age: 84
End: 2019-05-13

## 2019-05-13 ENCOUNTER — APPOINTMENT (OUTPATIENT)
Dept: MRI IMAGING | Facility: HOSPITAL | Age: 84
End: 2019-05-13

## 2019-05-13 ENCOUNTER — HOSPITAL ENCOUNTER (INPATIENT)
Facility: HOSPITAL | Age: 84
LOS: 1 days | Discharge: HOME | End: 2019-05-14
Attending: EMERGENCY MEDICINE | Admitting: HOSPITALIST

## 2019-05-13 ENCOUNTER — APPOINTMENT (OUTPATIENT)
Dept: CT IMAGING | Facility: HOSPITAL | Age: 84
End: 2019-05-13

## 2019-05-13 DIAGNOSIS — G45.9 TIA (TRANSIENT ISCHEMIC ATTACK): Primary | ICD-10-CM

## 2019-05-13 LAB
ABO GROUP BLD: NORMAL
ALBUMIN SERPL-MCNC: 3.9 G/DL (ref 3.5–5.2)
ALBUMIN/GLOB SERPL: 1.4 G/DL
ALP SERPL-CCNC: 63 U/L (ref 39–117)
ALT SERPL W P-5'-P-CCNC: 17 U/L (ref 1–33)
ANION GAP SERPL CALCULATED.3IONS-SCNC: 10 MMOL/L
APTT PPP: 24.9 SECONDS (ref 22.7–35.4)
AST SERPL-CCNC: 21 U/L (ref 1–32)
BASOPHILS # BLD AUTO: 0.05 10*3/MM3 (ref 0–0.2)
BASOPHILS NFR BLD AUTO: 0.8 % (ref 0–1.5)
BILIRUB SERPL-MCNC: 0.4 MG/DL (ref 0.2–1.2)
BILIRUB UR QL STRIP: NEGATIVE
BLD GP AB SCN SERPL QL: POSITIVE
BUN BLD-MCNC: 19 MG/DL (ref 8–23)
BUN/CREAT SERPL: 23.2 (ref 7–25)
CALCIUM SPEC-SCNC: 9.7 MG/DL (ref 8.6–10.5)
CHLORIDE SERPL-SCNC: 99 MMOL/L (ref 98–107)
CLARITY UR: CLEAR
CO2 SERPL-SCNC: 28 MMOL/L (ref 22–29)
COLOR UR: YELLOW
CREAT BLD-MCNC: 0.82 MG/DL (ref 0.57–1)
DEPRECATED RDW RBC AUTO: 47.5 FL (ref 37–54)
EOSINOPHIL # BLD AUTO: 0.11 10*3/MM3 (ref 0–0.4)
EOSINOPHIL NFR BLD AUTO: 1.8 % (ref 0.3–6.2)
ERYTHROCYTE [DISTWIDTH] IN BLOOD BY AUTOMATED COUNT: 13.6 % (ref 12.3–15.4)
GFR SERPL CREATININE-BSD FRML MDRD: 66 ML/MIN/1.73
GLOBULIN UR ELPH-MCNC: 2.8 GM/DL
GLUCOSE BLD-MCNC: 274 MG/DL (ref 65–99)
GLUCOSE BLDC GLUCOMTR-MCNC: 126 MG/DL (ref 70–130)
GLUCOSE BLDC GLUCOMTR-MCNC: 158 MG/DL (ref 70–130)
GLUCOSE BLDC GLUCOMTR-MCNC: 235 MG/DL (ref 70–130)
GLUCOSE UR STRIP-MCNC: ABNORMAL MG/DL
HCT VFR BLD AUTO: 44.6 % (ref 34–46.6)
HGB BLD-MCNC: 14.5 G/DL (ref 12–15.9)
HGB UR QL STRIP.AUTO: NEGATIVE
HOLD SPECIMEN: NORMAL
HOLD SPECIMEN: NORMAL
IMM GRANULOCYTES # BLD AUTO: 0.02 10*3/MM3 (ref 0–0.05)
IMM GRANULOCYTES NFR BLD AUTO: 0.3 % (ref 0–0.5)
INR PPP: 1 (ref 0.9–1.1)
KETONES UR QL STRIP: NEGATIVE
LEUKOCYTE ESTERASE UR QL STRIP.AUTO: NEGATIVE
LYMPHOCYTES # BLD AUTO: 0.77 10*3/MM3 (ref 0.7–3.1)
LYMPHOCYTES NFR BLD AUTO: 12.7 % (ref 19.6–45.3)
MCH RBC QN AUTO: 30.3 PG (ref 26.6–33)
MCHC RBC AUTO-ENTMCNC: 32.5 G/DL (ref 31.5–35.7)
MCV RBC AUTO: 93.1 FL (ref 79–97)
MONOCYTES # BLD AUTO: 0.38 10*3/MM3 (ref 0.1–0.9)
MONOCYTES NFR BLD AUTO: 6.3 % (ref 5–12)
NEUTROPHILS # BLD AUTO: 4.72 10*3/MM3 (ref 1.7–7)
NEUTROPHILS NFR BLD AUTO: 78.1 % (ref 42.7–76)
NITRITE UR QL STRIP: NEGATIVE
NONSPECIFIC GEL REACTION: NORMAL
NRBC BLD AUTO-RTO: 0 /100 WBC (ref 0–0.2)
PH UR STRIP.AUTO: 8 [PH] (ref 5–8)
PLATELET # BLD AUTO: 207 10*3/MM3 (ref 140–450)
PMV BLD AUTO: 8.7 FL (ref 6–12)
POTASSIUM BLD-SCNC: 4.5 MMOL/L (ref 3.5–5.2)
PROT SERPL-MCNC: 6.7 G/DL (ref 6–8.5)
PROT UR QL STRIP: NEGATIVE
PROTHROMBIN TIME: 12.9 SECONDS (ref 11.7–14.2)
RBC # BLD AUTO: 4.79 10*6/MM3 (ref 3.77–5.28)
RH BLD: NEGATIVE
SODIUM BLD-SCNC: 137 MMOL/L (ref 136–145)
SP GR UR STRIP: >=1.03 (ref 1–1.03)
T&S EXPIRATION DATE: NORMAL
TROPONIN T SERPL-MCNC: <0.01 NG/ML (ref 0–0.03)
UROBILINOGEN UR QL STRIP: ABNORMAL
WBC NRBC COR # BLD: 6.05 10*3/MM3 (ref 3.4–10.8)
WHOLE BLOOD HOLD SPECIMEN: NORMAL

## 2019-05-13 PROCEDURE — 85025 COMPLETE CBC W/AUTO DIFF WBC: CPT | Performed by: EMERGENCY MEDICINE

## 2019-05-13 PROCEDURE — 99284 EMERGENCY DEPT VISIT MOD MDM: CPT

## 2019-05-13 PROCEDURE — 86901 BLOOD TYPING SEROLOGIC RH(D): CPT | Performed by: EMERGENCY MEDICINE

## 2019-05-13 PROCEDURE — 86922 COMPATIBILITY TEST ANTIGLOB: CPT

## 2019-05-13 PROCEDURE — 0 IOPAMIDOL PER 1 ML: Performed by: EMERGENCY MEDICINE

## 2019-05-13 PROCEDURE — 81003 URINALYSIS AUTO W/O SCOPE: CPT | Performed by: EMERGENCY MEDICINE

## 2019-05-13 PROCEDURE — 86920 COMPATIBILITY TEST SPIN: CPT

## 2019-05-13 PROCEDURE — 82565 ASSAY OF CREATININE: CPT

## 2019-05-13 PROCEDURE — 80053 COMPREHEN METABOLIC PANEL: CPT | Performed by: EMERGENCY MEDICINE

## 2019-05-13 PROCEDURE — 85610 PROTHROMBIN TIME: CPT | Performed by: EMERGENCY MEDICINE

## 2019-05-13 PROCEDURE — 82962 GLUCOSE BLOOD TEST: CPT

## 2019-05-13 PROCEDURE — G0378 HOSPITAL OBSERVATION PER HR: HCPCS

## 2019-05-13 PROCEDURE — 82607 VITAMIN B-12: CPT | Performed by: NURSE PRACTITIONER

## 2019-05-13 PROCEDURE — 86870 RBC ANTIBODY IDENTIFICATION: CPT | Performed by: EMERGENCY MEDICINE

## 2019-05-13 PROCEDURE — 93010 ELECTROCARDIOGRAM REPORT: CPT | Performed by: INTERNAL MEDICINE

## 2019-05-13 PROCEDURE — 71045 X-RAY EXAM CHEST 1 VIEW: CPT

## 2019-05-13 PROCEDURE — 70551 MRI BRAIN STEM W/O DYE: CPT

## 2019-05-13 PROCEDURE — 70498 CT ANGIOGRAPHY NECK: CPT

## 2019-05-13 PROCEDURE — 86850 RBC ANTIBODY SCREEN: CPT | Performed by: EMERGENCY MEDICINE

## 2019-05-13 PROCEDURE — 99205 OFFICE O/P NEW HI 60 MIN: CPT | Performed by: PSYCHIATRY & NEUROLOGY

## 2019-05-13 PROCEDURE — 93005 ELECTROCARDIOGRAM TRACING: CPT | Performed by: EMERGENCY MEDICINE

## 2019-05-13 PROCEDURE — 86900 BLOOD TYPING SEROLOGIC ABO: CPT | Performed by: EMERGENCY MEDICINE

## 2019-05-13 PROCEDURE — 85730 THROMBOPLASTIN TIME PARTIAL: CPT | Performed by: EMERGENCY MEDICINE

## 2019-05-13 PROCEDURE — 70496 CT ANGIOGRAPHY HEAD: CPT

## 2019-05-13 PROCEDURE — 84484 ASSAY OF TROPONIN QUANT: CPT | Performed by: EMERGENCY MEDICINE

## 2019-05-13 PROCEDURE — 0042T HC CT CEREBRAL PERFUSION W/WO CONTRAST: CPT

## 2019-05-13 RX ORDER — SODIUM CHLORIDE 0.9 % (FLUSH) 0.9 %
3-10 SYRINGE (ML) INJECTION AS NEEDED
Status: DISCONTINUED | OUTPATIENT
Start: 2019-05-13 | End: 2019-05-14 | Stop reason: HOSPADM

## 2019-05-13 RX ORDER — ESCITALOPRAM OXALATE 10 MG/1
10 TABLET ORAL
COMMUNITY
End: 2019-05-16 | Stop reason: SDUPTHER

## 2019-05-13 RX ORDER — DOCUSATE SODIUM 100 MG/1
100 CAPSULE, LIQUID FILLED ORAL NIGHTLY
Status: DISCONTINUED | OUTPATIENT
Start: 2019-05-13 | End: 2019-05-14 | Stop reason: HOSPADM

## 2019-05-13 RX ORDER — SODIUM CHLORIDE 0.9 % (FLUSH) 0.9 %
3 SYRINGE (ML) INJECTION EVERY 12 HOURS SCHEDULED
Status: DISCONTINUED | OUTPATIENT
Start: 2019-05-13 | End: 2019-05-14 | Stop reason: HOSPADM

## 2019-05-13 RX ORDER — ESCITALOPRAM OXALATE 10 MG/1
10 TABLET ORAL DAILY
Status: DISCONTINUED | OUTPATIENT
Start: 2019-05-13 | End: 2019-05-14 | Stop reason: HOSPADM

## 2019-05-13 RX ORDER — SODIUM CHLORIDE 9 MG/ML
75 INJECTION, SOLUTION INTRAVENOUS CONTINUOUS
Status: DISCONTINUED | OUTPATIENT
Start: 2019-05-13 | End: 2019-05-14 | Stop reason: HOSPADM

## 2019-05-13 RX ORDER — SODIUM CHLORIDE 0.9 % (FLUSH) 0.9 %
10 SYRINGE (ML) INJECTION AS NEEDED
Status: DISCONTINUED | OUTPATIENT
Start: 2019-05-13 | End: 2019-05-14 | Stop reason: HOSPADM

## 2019-05-13 RX ORDER — CYCLOSPORINE 0.5 MG/ML
1 EMULSION OPHTHALMIC 2 TIMES DAILY
Status: DISCONTINUED | OUTPATIENT
Start: 2019-05-13 | End: 2019-05-14 | Stop reason: HOSPADM

## 2019-05-13 RX ORDER — SODIUM CHLORIDE 9 MG/ML
125 INJECTION, SOLUTION INTRAVENOUS CONTINUOUS
Status: DISCONTINUED | OUTPATIENT
Start: 2019-05-13 | End: 2019-05-13

## 2019-05-13 RX ORDER — NICOTINE POLACRILEX 4 MG
15 LOZENGE BUCCAL
Status: DISCONTINUED | OUTPATIENT
Start: 2019-05-13 | End: 2019-05-14 | Stop reason: HOSPADM

## 2019-05-13 RX ORDER — DEXTROSE MONOHYDRATE 25 G/50ML
25 INJECTION, SOLUTION INTRAVENOUS
Status: DISCONTINUED | OUTPATIENT
Start: 2019-05-13 | End: 2019-05-14 | Stop reason: HOSPADM

## 2019-05-13 RX ORDER — DOCUSATE SODIUM 100 MG/1
200 CAPSULE, LIQUID FILLED ORAL NIGHTLY
COMMUNITY

## 2019-05-13 RX ORDER — ASPIRIN 325 MG
325 TABLET ORAL ONCE
Status: COMPLETED | OUTPATIENT
Start: 2019-05-13 | End: 2019-05-13

## 2019-05-13 RX ORDER — ASPIRIN 81 MG/1
81 TABLET, CHEWABLE ORAL DAILY
Status: DISCONTINUED | OUTPATIENT
Start: 2019-05-13 | End: 2019-05-14 | Stop reason: HOSPADM

## 2019-05-13 RX ORDER — ATORVASTATIN CALCIUM 80 MG/1
80 TABLET, FILM COATED ORAL NIGHTLY
Status: DISCONTINUED | OUTPATIENT
Start: 2019-05-13 | End: 2019-05-14

## 2019-05-13 RX ORDER — ASPIRIN 300 MG/1
300 SUPPOSITORY RECTAL DAILY
Status: DISCONTINUED | OUTPATIENT
Start: 2019-05-13 | End: 2019-05-14 | Stop reason: HOSPADM

## 2019-05-13 RX ADMIN — SODIUM CHLORIDE 75 ML/HR: 9 INJECTION, SOLUTION INTRAVENOUS at 18:51

## 2019-05-13 RX ADMIN — SODIUM CHLORIDE, PRESERVATIVE FREE 3 ML: 5 INJECTION INTRAVENOUS at 21:38

## 2019-05-13 RX ADMIN — ASPIRIN 325 MG: 325 TABLET ORAL at 15:37

## 2019-05-13 RX ADMIN — DOCUSATE SODIUM 100 MG: 100 CAPSULE, LIQUID FILLED ORAL at 21:38

## 2019-05-13 RX ADMIN — CYCLOSPORINE 1 DROP: 0.5 EMULSION OPHTHALMIC at 21:38

## 2019-05-13 RX ADMIN — IOPAMIDOL 150 ML: 755 INJECTION, SOLUTION INTRAVENOUS at 14:28

## 2019-05-13 RX ADMIN — SODIUM CHLORIDE, PRESERVATIVE FREE 3 ML: 5 INJECTION INTRAVENOUS at 18:50

## 2019-05-13 RX ADMIN — ESCITALOPRAM 10 MG: 10 TABLET, FILM COATED ORAL at 21:38

## 2019-05-13 RX ADMIN — SODIUM CHLORIDE 500 ML: 9 INJECTION, SOLUTION INTRAVENOUS at 18:49

## 2019-05-13 NOTE — ED PROVIDER NOTES
" EMERGENCY DEPARTMENT ENCOUNTER    CHIEF COMPLAINT  Chief Complaint: Stroke-like Sx  History given by: Pt  History limited by: none  Room Number: N531/1  PMD: Jerardo Kaur MD      HPI:  Pt is a 84 y.o. female who presents complaining of waxing and waning left arm and left leg weakness that began today at 1200 while brushing her hair, currently resolved. Pt states her left leg and arm felt weak and had \"no control\" of them and had difficulty walking. Pt states Sx initially lasted 30 minutes then began to improve. Pt states she called her son who noticed the pt had dysarthria (\"talking like she was drunk\"). Per son, pt's slurred speech has improved while driving her here and now seems mild. Pt denies any pain, recent illness, any recent falls, tingling, headache, vision changes, cough, or dysuria. Pt denies Hx of heart problems or previous strokes. Pt states she is pre-diabetic. Pt states the only medication she is on is for anxiety.     Duration:  Since noon  Onset: gradal  Timing: waxing and waning  Location: left arm, left leg  Radiation: none  Quality: weakness, lack of control  Intensity/Severity: moderate  Progression: currently resolved  Associated Symptoms: speech difficulty, difficulty walking  Aggravating Factors: none  Alleviating Factors: none  Previous Episodes: none  Treatment before arrival: none    PAST MEDICAL HISTORY  Active Ambulatory Problems     Diagnosis Date Noted   • Chronic rhinitis 01/28/2016   • Dry eyes 01/28/2016   • Hearing loss 01/28/2016   • Osteoporosis 01/28/2016   • Adjustment disorder with mixed anxiety and depressed mood 01/28/2016   • Onychomycosis 03/10/2016   • IFG (impaired fasting glucose) 03/10/2016   • JESUS (generalized anxiety disorder) 05/27/2016   • Pruritus 07/07/2016   • Seborrheic keratoses 07/07/2016   • Stress incontinence in female 03/13/2017   • Xerosis of skin 03/13/2017   • Chronic midline low back pain without sciatica 05/18/2017   • Osteoporosis 02/14/2018 "   • Vertigo 09/11/2018     Resolved Ambulatory Problems     Diagnosis Date Noted   • Globus sensation 01/28/2016   • Skin lesion 01/28/2016   • Chronic laryngitis 01/28/2016   • Healthcare maintenance 03/10/2016   • Leg pain, left 04/18/2017     Past Medical History:   Diagnosis Date   • Chronic constipation    • Chronic rhinitis    • Diverticulosis    • Dry eyes    • Globus sensation    • Hearing loss    • History of allergic rhinitis    • History of onychomycosis    • Internal hemorrhoids    • Laryngitis, chronic    • Osteoarthritis, chronic    • Osteoporosis    • Pancreatitis due to biliary obstruction 2013   • Postpartum depression    • Prediabetes    • Reaction, adjustment, with anxious, depressed mood        PAST SURGICAL HISTORY  Past Surgical History:   Procedure Laterality Date   • CATARACT EXTRACTION     • CATARACT EXTRACTION BILATERAL W/ ANTERIOR VITRECTOMY  2009   • CHOLECYSTECTOMY  2013   • DILATION AND CURETTAGE, DIAGNOSTIC / THERAPEUTIC  1986   • EYE PTOSIS REPAIR Bilateral 2015   • REFRACTIVE SURGERY      2015 L and 2016 R   • TEAR DUCT SURGERY      2007 R and 2014 L; Dr. Davis   • TONSILLECTOMY  1957       FAMILY HISTORY  Family History   Problem Relation Age of Onset   • Diabetes Mother    • Heart disease Mother    • Depression Father    • Parkinsonism Father    • Diabetes Sister    • Alzheimer's disease Brother    • Diabetes Brother    • Diabetes Maternal Grandmother    • Diabetes Brother    • Prostate cancer Brother    • Diabetes Sister    • No Known Problems Daughter    • No Known Problems Son        SOCIAL HISTORY  Social History     Socioeconomic History   • Marital status:      Spouse name: Not on file   • Number of children: 2   • Years of education: Not on file   • Highest education level: Not on file   Occupational History   • Occupation: Homemaker   Tobacco Use   • Smoking status: Never Smoker   • Smokeless tobacco: Never Used   Substance and Sexual Activity   • Alcohol use: Yes  "    Comment: quit in 2018   • Drug use: No   • Sexual activity: No     Partners: Male     Comment:  only   Social History Narrative    Diet - \"Try to make it as healthy as possible\"; eats fruits and veggies    Exercise - None; 2018 back at [facility] 3x/ week    Caffeine - None       ALLERGIES  Cefaclor; Cephalexin; Levofloxacin; and Penicillins    REVIEW OF SYSTEMS  Review of Systems   Constitutional: Negative for fever.   HENT: Negative for sore throat.    Eyes: Negative.    Respiratory: Negative for cough and shortness of breath.    Cardiovascular: Negative for chest pain.   Gastrointestinal: Negative for abdominal pain, diarrhea and vomiting.   Genitourinary: Negative for dysuria.   Musculoskeletal: Negative for neck pain.   Skin: Negative for rash.   Neurological: Positive for speech difficulty (slurred speech, improved per son) and weakness (left leg and arm that began at noon today). Negative for numbness and headaches.   Hematological: Negative.    Psychiatric/Behavioral: Negative.    All other systems reviewed and are negative.      PHYSICAL EXAM  ED Triage Vitals [05/13/19 1351]   Temp Heart Rate Resp BP SpO2   98.1 °F (36.7 °C) 101 16 -- 98 %      Temp src Heart Rate Source Patient Position BP Location FiO2 (%)   Tympanic -- -- -- --       Physical Exam   Constitutional: She is oriented to person, place, and time and well-developed, well-nourished, and in no distress. No distress.   HENT:   Head: Normocephalic and atraumatic.   Mouth/Throat: Mucous membranes are normal.   Eyes: EOM are normal. Pupils are equal, round, and reactive to light.   Neck: Normal range of motion. Neck supple.   Cardiovascular: Normal rate, regular rhythm, normal heart sounds and intact distal pulses. Exam reveals no gallop and no friction rub.   No murmur heard.  Pulmonary/Chest: Effort normal and breath sounds normal. No respiratory distress. She has no wheezes. She has no rhonchi. She has no rales.   Breath sounds are " symmetric.   Abdominal: Soft. She exhibits no distension. There is no tenderness. There is no rebound and no guarding.   Musculoskeletal: Normal range of motion. She exhibits no edema or deformity.   Neurological: She is alert and oriented to person, place, and time. She has normal motor skills, normal sensation, normal strength and intact cranial nerves. She is not agitated and not disoriented. She displays abnormal speech (mild dysarthria). She displays facial symmetry. She has a normal Cerebellar Exam, a normal Finger-Nose-Finger Test and a normal Heel to Campbell Test. She shows no pronator drift. GCS score is 15.   NIHSS = 1 (see in procedure for details)   Skin: Skin is warm and dry. No rash noted.   Psychiatric: Mood and affect normal.   Calm, cooperative   Nursing note and vitals reviewed.      LAB RESULTS  Lab Results (last 24 hours)     Procedure Component Value Units Date/Time    CBC & Differential [[id number]] Collected:  05/13/19 1414    Specimen:  Blood Updated:  05/13/19 1422    Narrative:       The following orders were created for panel order CBC & Differential.  Procedure                               Abnormality         Status                     ---------                               -----------         ------                     CBC Auto Differential[[id number]]        Abnormal            Final result                 Please view results for these tests on the individual orders.    Comprehensive Metabolic Panel [[id number]]  (Abnormal) Collected:  05/13/19 1414    Specimen:  Blood Updated:  05/13/19 1451     Glucose 274 mg/dL      BUN 19 mg/dL      Creatinine 0.82 mg/dL      Sodium 137 mmol/L      Potassium 4.5 mmol/L      Chloride 99 mmol/L      CO2 28.0 mmol/L      Calcium 9.7 mg/dL      Total Protein 6.7 g/dL      Albumin 3.90 g/dL      ALT (SGPT) 17 U/L      AST (SGOT) 21 U/L      Alkaline Phosphatase 63 U/L      Total Bilirubin 0.4 mg/dL      eGFR Non African Amer 66 mL/min/1.73      Globulin 2.8  gm/dL      A/G Ratio 1.4 g/dL      BUN/Creatinine Ratio 23.2     Anion Gap 10.0 mmol/L     Narrative:       GFR Normal >60  Chronic Kidney Disease <60  Kidney Failure <15    Troponin [[id number]]  (Normal) Collected:  05/13/19 1414    Specimen:  Blood Updated:  05/13/19 1451     Troponin T <0.010 ng/mL     Narrative:       Troponin T Reference Range:  <= 0.03 ng/mL-   Negative for AMI  >0.03 ng/mL-     Abnormal for myocardial necrosis.  Clinicians would have to utilize clinical acumen, EKG, Troponin and serial changes to determine if it is an Acute Myocardial Infarction or myocardial injury due to an underlying chronic condition.     CBC Auto Differential [[id number]]  (Abnormal) Collected:  05/13/19 1414    Specimen:  Blood Updated:  05/13/19 1422     WBC 6.05 10*3/mm3      RBC 4.79 10*6/mm3      Hemoglobin 14.5 g/dL      Hematocrit 44.6 %      MCV 93.1 fL      MCH 30.3 pg      MCHC 32.5 g/dL      RDW 13.6 %      RDW-SD 47.5 fl      MPV 8.7 fL      Platelets 207 10*3/mm3      Neutrophil % 78.1 %      Lymphocyte % 12.7 %      Monocyte % 6.3 %      Eosinophil % 1.8 %      Basophil % 0.8 %      Immature Grans % 0.3 %      Neutrophils, Absolute 4.72 10*3/mm3      Lymphocytes, Absolute 0.77 10*3/mm3      Monocytes, Absolute 0.38 10*3/mm3      Eosinophils, Absolute 0.11 10*3/mm3      Basophils, Absolute 0.05 10*3/mm3      Immature Grans, Absolute 0.02 10*3/mm3      nRBC 0.0 /100 WBC     POC Glucose Once [[id number]]  (Abnormal) Collected:  05/13/19 1414    Specimen:  Blood Updated:  05/13/19 1415     Glucose 235 mg/dL     Protime-INR [[id number]]  (Normal) Collected:  05/13/19 1415    Specimen:  Blood Updated:  05/13/19 1441     Protime 12.9 Seconds      INR 1.00    aPTT [[id number]]  (Normal) Collected:  05/13/19 1415    Specimen:  Blood Updated:  05/13/19 1441     PTT 24.9 seconds     POC Glucose Once [[id number]]  (Abnormal) Collected:  05/13/19 1646    Specimen:  Blood Updated:  05/13/19 1648     Glucose 158 mg/dL            I ordered the above labs and reviewed the results    RADIOLOGY  CT Angiogram Head With & Without Contrast   Preliminary Result       There is no convincing evidence to suggest an area of acute completed   infarction either on the CT perfusion study or the noncontrast head CT.   There is a questionable small area of perfusion abnormality within the   superior medial portion of the left cerebellar hemisphere as manifest on   the time to maximum enhancement greater than 4 seconds maps. This could   be further correlated with a follow-up MRI study to more sensitively   assess for a small area of acute infarction related to a potential area   of hypoperfusion within the superior medial portion left cerebellar   hemisphere.       There is no significant NASCET stenosis within either common carotid   artery bifurcation or proximal internal carotid artery.       There is a focally ectatic segment of the left artery within the V1   segment and this measures up to 3 mm in diameter. There is a   questionable area of fibromuscular dysplasia involvement of the V1   segment of the left vertebral artery where there is a somewhat beaded   appearance of the vessel. This ectatic appearance could be related to   fibromuscular dysplasia or potentially an area of atherosclerotic   ectasia. A less likely possibility would be an age-indeterminate   dissection with a small pseudoaneurysm at this site.       The findings of the noncontrast head CT were discussed with Dr. Hernandez   on 05/13/2019 at approximately 2:18 PM. The findings of the CT angiogram   and CT perfusion study were discussed with Dr. Hernandez at approximately   35 p.m.                        Radiation dose reduction techniques were utilized, including automated   exposure control and exposure modulation based on body size.              CT Angiogram Neck With & Without Contrast   Preliminary Result       There is no convincing evidence to suggest an area of acute  completed   infarction either on the CT perfusion study or the noncontrast head CT.   There is a questionable small area of perfusion abnormality within the   superior medial portion of the left cerebellar hemisphere as manifest on   the time to maximum enhancement greater than 4 seconds maps. This could   be further correlated with a follow-up MRI study to more sensitively   assess for a small area of acute infarction related to a potential area   of hypoperfusion within the superior medial portion left cerebellar   hemisphere.       There is no significant NASCET stenosis within either common carotid   artery bifurcation or proximal internal carotid artery.       There is a focally ectatic segment of the left artery within the V1   segment and this measures up to 3 mm in diameter. There is a   questionable area of fibromuscular dysplasia involvement of the V1   segment of the left vertebral artery where there is a somewhat beaded   appearance of the vessel. This ectatic appearance could be related to   fibromuscular dysplasia or potentially an area of atherosclerotic   ectasia. A less likely possibility would be an age-indeterminate   dissection with a small pseudoaneurysm at this site.       The findings of the noncontrast head CT were discussed with Dr. Hernandez   on 05/13/2019 at approximately 2:18 PM. The findings of the CT angiogram   and CT perfusion study were discussed with Dr. Hernandez at approximately   35 p.m.                        Radiation dose reduction techniques were utilized, including automated   exposure control and exposure modulation based on body size.              CT Cerebral Perfusion With & Without Contrast   Preliminary Result       There is no convincing evidence to suggest an area of acute completed   infarction either on the CT perfusion study or the noncontrast head CT.   There is a questionable small area of perfusion abnormality within the   superior medial portion of the left  cerebellar hemisphere as manifest on   the time to maximum enhancement greater than 4 seconds maps. This could   be further correlated with a follow-up MRI study to more sensitively   assess for a small area of acute infarction related to a potential area   of hypoperfusion within the superior medial portion left cerebellar   hemisphere.       There is no significant NASCET stenosis within either common carotid   artery bifurcation or proximal internal carotid artery.       There is a focally ectatic segment of the left artery within the V1   segment and this measures up to 3 mm in diameter. There is a   questionable area of fibromuscular dysplasia involvement of the V1   segment of the left vertebral artery where there is a somewhat beaded   appearance of the vessel. This ectatic appearance could be related to   fibromuscular dysplasia or potentially an area of atherosclerotic   ectasia. A less likely possibility would be an age-indeterminate   dissection with a small pseudoaneurysm at this site.       The findings of the noncontrast head CT were discussed with Dr. Hernandez   on 05/13/2019 at approximately 2:18 PM. The findings of the CT angiogram   and CT perfusion study were discussed with Dr. Hernandez at approximately   35 p.m.                        Radiation dose reduction techniques were utilized, including automated   exposure control and exposure modulation based on body size.              XR Chest 1 View   Final Result      MRI Brain Without Contrast    (Results Pending)        I ordered the above noted radiological studies. Interpreted by radiologist. Discussed with radiologist (). Reviewed by me in PACS.       PROCEDURES  Procedures    NIH Stroke Scale      Interval: Baseline  Time: 2:09 PM  Person Administering Scale: Dr. Welsh    Administer stroke scale items in the order listed. Record performance in each category after each subscale exam. Do not go back and change scores. Follow directions provided  for each exam technique. Scores should reflect what the patient does, not what the clinician thinks the patient can do. The clinician should record answers while administering the exam and work quickly. Except where indicated, the patient should not be coached (i.e., repeated requests to patient to make a special effort).      1a  Level of consciousness: 0=alert; keenly responsive   1b. LOC questions:  0=Performs both tasks correctly   1c. LOC commands: 0=Performs both tasks correctly   2.  Best Gaze: 0=normal   3.  Visual: 0=No visual loss   4. Facial Palsy: 0=Normal symmetric movement   5a.  Motor left arm: 0=No drift, limb holds 90 (or 45) degrees for full 10 seconds   5b.  Motor right arm: 0=No drift, limb holds 90 (or 45) degrees for full 10 seconds   6a. motor left le=No drift, limb holds 90 (or 45) degrees for full 10 seconds   6b  Motor right le=No drift, limb holds 90 (or 45) degrees for full 10 seconds   7. Limb Ataxia: 0=Absent   8.  Sensory: 0=Normal; no sensory loss   9. Best Language:  0=No aphasia, normal   10. Dysarthria: 1=Mild to moderate, patient slurs at least some words and at worst, can be understood with some difficulty   11. Extinction and Inattention: 0=No abnormality   12. Distal motor function: 0=Normal    Total:   1     EKG          EKG time: 1449  Rhythm/Rate: NSR, 98  P waves and [place]: Nml  QRS, axis: Nml   ST and T waves: nonspecific T wave abnormalities. No STEMI     Interpreted Contemporaneously by me, independently viewed  No prior      PROGRESS AND CONSULTS       1410  Team D called.     1412  Discussed the pt's case with Dr. Hernandez, Stroke Neurology, who recommends CTA and CTP for further evaluation.     1445  D/w Dr. Yunior Hernandez, Stroke Neurology, who evaluated the pt. NIH is now 0. Pt is not a tPA candidate. Agrees with plan to admit the pt for TIA workup. Will give 500 cc bolus and ASA.    1456  Discussed the pt's case with Dr. Davidson, [facility] who agrees to admit the  pt.     MEDICAL DECISION MAKING  Results were reviewed/discussed with the patient and they were also made aware of online access. Pt also made aware that some labs, such as cultures, will not be resulted during ER visit and follow up with PMD is necessary.     MDM  Number of Diagnoses or Management Options  TIA (transient ischemic attack):   Diagnosis management comments: Presentation today is more concerning for TIA versus CVA based on her extremity weakness and slurred speech issues.  Symptoms have been fluctuating in intensity since onset.  On arrival she is within the TPA window, however, due to low NIH and near complete resolution of symptoms at this time will withhold TPA.  Imaging has not shown any acute large vessel occlusions.  Neurology has evaluated the bedside and will admit her for further stroke work-up in conjunction with the medicine service.  Patient was given aspirin and some IV fluids.  Patient and family have been obtained the course and plan the need for further work-up in the hospital.        Amount and/or Complexity of Data Reviewed  Clinical lab tests: ordered and reviewed (Lab work unremarkable.)  Tests in the radiology section of CPT®: ordered and reviewed (CTA and CTP negative acute for complete infarction. )  Tests in the medicine section of CPT®: ordered and reviewed (See EKG results in procedure. )  Discussion of test results with the performing providers: yes (Dr. Yunior Hernandez, Stroke Neurology)  Obtain history from someone other than the patient: yes (Son)  Discuss the patient with other providers: yes (Dr. Davidson, [facility])  Independent visualization of images, tracings, or specimens: yes           DIAGNOSIS  Final diagnoses:   TIA (transient ischemic attack)       DISPOSITION  ADMISSION    Discussed treatment plan and reason for admission with pt/family and admitting physician.  Pt/family voiced understanding of the plan for admission for further testing/treatment as needed.          Latest Documented Vital Signs:  As of 5:25 PM  BP- 146/82 HR- 87 Temp- 98.1 °F (36.7 °C) (Tympanic) O2 sat- 99%    --  Documentation assistance provided by thomas Bright for Dr. Welsh.  Information recorded by the scribe was done at my direction and has been verified and validated by me.            Leonides Bright  05/13/19 1125       Scott Welsh MD  05/13/19 8634

## 2019-05-13 NOTE — ED NOTES
"Nursing report ED to floor  Cristy Wilde  84 y.o.  female    HPI (triage note):   Chief Complaint   Patient presents with   • Neuro Deficit(s)       Admitting doctor:   Darryl Davidson MD    Admitting diagnosis:   The encounter diagnosis was TIA (transient ischemic attack).    Code status:   Current Code Status     Date Active Code Status Order ID Comments User Context       5/13/2019 14:52 CPR [id number]  Leonides Hernandez MD ED       Questions for Current Code Status     Question Answer Comment    Code Status CPR     Medical Interventions (Level of Support Prior to Arrest) Full     Level Of Support Discussed With Patient           Allergies:   Cefaclor; Cephalexin; Levofloxacin; and Penicillins    Weight:       05/13/19  1400   Weight: 53.5 kg (118 lb)       Most recent vitals:   Vitals:    05/13/19 1351 05/13/19 1400 05/13/19 1503   BP:  139/71 142/64   Patient Position:   Lying   Pulse: 101  92   Resp: 16     Temp: 98.1 °F (36.7 °C)     TempSrc: Tympanic     SpO2: 98%  98%   Weight:  53.5 kg (118 lb)    Height:  160 cm (63\")        Active LDAs/IV Access:   Lines, Drains & Airways    Active LDAs     Name:   Placement date:   Placement time:   Site:   Days:    Peripheral IV 05/13/19 1412 Right Antecubital   05/13/19    1412    Antecubital   less than 1                Labs (abnormal labs have a star):   Labs Reviewed   COMPREHENSIVE METABOLIC PANEL - Abnormal; Notable for the following components:       Result Value    Glucose 274 (*)     All other components within normal limits    Narrative:     GFR Normal >60  Chronic Kidney Disease <60  Kidney Failure <15   CBC WITH AUTO DIFFERENTIAL - Abnormal; Notable for the following components:    Neutrophil % 78.1 (*)     Lymphocyte % 12.7 (*)     All other components within normal limits   POCT GLUCOSE FINGERSTICK - Abnormal; Notable for the following components:    Glucose 235 (*)     All other components within normal limits   PROTIME-INR - Normal "   APTT - Normal   TROPONIN (IN-HOUSE) - Normal    Narrative:     Troponin T Reference Range:  <= 0.03 ng/mL-   Negative for AMI  >0.03 ng/mL-     Abnormal for myocardial necrosis.  Clinicians would have to utilize clinical acumen, EKG, Troponin and serial changes to determine if it is an Acute Myocardial Infarction or myocardial injury due to an underlying chronic condition.    RAINBOW DRAW    Narrative:     The following orders were created for panel order [place] Draw.  Procedure                               Abnormality         Status                     ---------                               -----------         ------                     Light Blue Top[[id number]]                                   In process                 Green Top (Gel)[[id number]]                                  Final result               Lavender Top[[id number]]                                     Final result               Gold Top - SST[[id number]]                                   Final result                 Please view results for these tests on the individual orders.   URINALYSIS W/ MICROSCOPIC IF INDICATED (NO CULTURE)   POCT GLUCOSE FINGERSTICK   POCT GLUCOSE FINGERSTICK   POCT GLUCOSE FINGERSTICK   POCT GLUCOSE FINGERSTICK   POCT GLUCOSE FINGERSTICK   TYPE AND SCREEN   CBC AND DIFFERENTIAL    Narrative:     The following orders were created for panel order CBC & Differential.  Procedure                               Abnormality         Status                     ---------                               -----------         ------                     CBC Auto Differential[[id number]]        Abnormal            Final result                 Please view results for these tests on the individual orders.   GREEN TOP   LAVENDER TOP   GOLD TOP - SST   LIGHT BLUE TOP       EKG:   ECG 12 Lead   Preliminary Result   HEART RATE= 98  bpm   RR Interval= 616  ms   TN Interval= 192  ms   P Horizontal Axis= 0  deg   P Front Axis= 59  deg   QRSD  "Interval= 85  ms   QT Interval= 367  ms   QRS Axis= 44  deg   T Wave Axis= 83  deg   - ABNORMAL ECG -   Sinus rhythm   Nonspecific repol abnormality, lateral leads   Electronically Signed By:    Date and Time of Study: 2019-05-13 14:49:20          Meds given in ED:   Medications   sodium chloride 0.9 % flush 10 mL (not administered)   sodium chloride 0.9 % bolus 500 mL (not administered)   sodium chloride 0.9 % infusion (not administered)   sodium chloride 0.9 % flush 3 mL (not administered)   sodium chloride 0.9 % flush 3-10 mL (not administered)   atorvastatin (LIPITOR) tablet 80 mg (not administered)   aspirin chewable tablet 81 mg (not administered)     Or   aspirin suppository 300 mg (not administered)   sodium chloride 0.9 % bolus 500 mL (not administered)   sodium chloride 0.9 % infusion (not administered)   iopamidol (ISOVUE-370) 76 % injection 150 mL (150 mL Intravenous Given by Other 5/13/19 7167)   aspirin tablet 325 mg (325 mg Oral Given 5/13/19 1537)       Imaging results:  No radiology results for the last day    Ambulatory status:   - up with assist x 2, fall risk    Social issues:   Social History     Socioeconomic History   • Marital status:      Spouse name: Not on file   • Number of children: 2   • Years of education: Not on file   • Highest education level: Not on file   Occupational History   • Occupation: Homemaker   Tobacco Use   • Smoking status: Never Smoker   • Smokeless tobacco: Never Used   Substance and Sexual Activity   • Alcohol use: Yes     Comment: < 1 drinks/ month; quit in 2018   • Drug use: No   • Sexual activity: No     Partners: Male     Comment:  only   Social History Narrative    Diet - \"Try to make it as healthy as possible\"; eats fruits and veggies    Exercise - None; 2018 back at [facility] 3x/ week    Caffeine - None        Lora Albert, RN  05/13/19 8604    "

## 2019-05-13 NOTE — ED NOTES
Per BB, pt has antibody and will take longer to have blood ready. Naz DANG on 5N notified      Lora Albert, LAURENCE  05/13/19 2559

## 2019-05-13 NOTE — ED NOTES
Pt reports feeling off balance and speech difficulity that started about 1200 today. Pt denies headache or blurred vision.      Loyda Phillips RN  05/13/19 2196

## 2019-05-13 NOTE — CONSULTS
Neurology Consult Note    Consult Date: 5/13/2019    Referring MD: Kvng Welsh    Reason for Consult I have been asked to see the patient in neurological consultation to render advice and opinion regarding TIA    Cristy Wilde is a 84 y.o. female with no significant past medical history, no prior history of CAD or stroke.  She presented with a last known normal around noon when she suddenly developed left-sided arm and leg weakness and clumsiness.  She had difficulty walking and felt that her left side was numb and had difficulty using the arm.  She called her son who came to check on her and found that she had moderately slurred speech.  He took her to the hospital and in route her slurred speech became worse.  On arrival to the emergency department her left-sided weakness was mostly improved but she had persistent dysarthria.  She underwent team D imaging and by the time that was completed she had resolved to NIH 0.  Currently she is back to her neurologic baseline but still complains of a subjective abnormal sensation in the left arm and leg.    Past Medical/Surgical Hx:  Past Medical History:   Diagnosis Date   • Chronic constipation    • Chronic rhinitis     with noted vasomotor rhinitis   • Diverticulosis    • Dry eyes     sees dr norton   • Globus sensation    • Hearing loss     noted on 2015 audiology testing   • History of allergic rhinitis    • History of onychomycosis    • Internal hemorrhoids     grade 2   • Laryngitis, chronic    • Osteoarthritis, chronic     minimal sx controlled with exercise   • Osteoporosis    • Pancreatitis due to biliary obstruction 2013    relieved after lap lacho   • Postpartum depression    • Prediabetes    • Reaction, adjustment, with anxious, depressed mood      Past Surgical History:   Procedure Laterality Date   • CATARACT EXTRACTION     • CATARACT EXTRACTION BILATERAL W/ ANTERIOR VITRECTOMY  2009   • CHOLECYSTECTOMY  2013   • DILATION AND CURETTAGE, DIAGNOSTIC /  "THERAPEUTIC  1986   • EYE PTOSIS REPAIR Bilateral 2015   • REFRACTIVE SURGERY      2015 L and 2016 R   • TEAR DUCT SURGERY      2007 R and 2014 L; Dr. Davis   • TONSILLECTOMY  1957       Medications On Admission  Lexapro 10 mg  Antivert    Allergies:  Allergies   Allergen Reactions   • Cefaclor    • Cephalexin    • Levofloxacin    • Penicillins        Social Hx:  Social History     Socioeconomic History   • Marital status:      Spouse name: Not on file   • Number of children: 2   • Years of education: Not on file   • Highest education level: Not on file   Occupational History   • Occupation: Homemaker   Tobacco Use   • Smoking status: Never Smoker   • Smokeless tobacco: Never Used   Substance and Sexual Activity   • Alcohol use: Yes     Comment: < 1 drinks/ month; quit in 2018   • Drug use: No   • Sexual activity: No     Partners: Male     Comment:  only   Social History Narrative    Diet - \"Try to make it as healthy as possible\"; eats fruits and veggies    Exercise - None; 2018 back at [employer] 3x/ week    Caffeine - None       Family Hx:  Family History   Problem Relation Age of Onset   • Diabetes Mother    • Heart disease Mother    • Depression Father    • Parkinsonism Father    • Diabetes Sister    • Alzheimer's disease Brother    • Diabetes Brother    • Diabetes Maternal Grandmother    • Diabetes Brother    • Prostate cancer Brother    • Diabetes Sister    • No Known Problems Daughter    • No Known Problems Son        Review of Systems  Constitutional: [No fevers, chills]  Eye: [No recent visual problems, eye discharge]  HEENT: [No ear pain, nasal congestion]  Respiratory: [No shortness of breath, cough]  Cardiovascular: [No Chest pain, + palpitations]  Gastrointestinal: [No nausea, vomiting]  Genitourinary: [No hematuria, incontinence]  Hema/Lymph: [no nosebleeds, history of anticoagulation]  Endocrine: [Negative for excessive urination, heat or cold intolerance]  Musculoskeletal: [No back pain, " "neck pain]  Integumentary: [No rash, pruritus]  Neurologic: [+ weakness, numbness]  Psychiatric: [No anxiety, depression]    Exam    /71   Pulse 101   Temp 98.1 °F (36.7 °C) (Tympanic)   Resp 16   Ht 160 cm (63\")   Wt 53.5 kg (118 lb)   LMP  (LMP Unknown)   SpO2 98%   BMI 20.90 kg/m²   gen: NAD, vitals reviewed  Eyes: fundus sharp with no papilledema or retinal hemorrhages  HEENT: no nuchal rigidity  CVS: RRR, S1, S2  MS: oriented x3, recent/remote memory intact, normal attention/concentration, language intact, no neglect, normal fund of knowledge  CN: visual acuity grossly normal, visual fields full, PERRL, EOMI, facial sensation equal, no facial droop, hearing symmetric, palate elevates symmetrically, shoulder shrug equal, tongue midline  Motor: 5/5 throughout upper and lower extremities, normal tone  Sensation: Diminished to cold temperature left arm and leg  Reflexes: 2+ throughout upper and lower extremities, downgoing plantars  Coordination: no dysmetria with finger to nose bilaterally  Gait: Mild ataxia, needs some support on the left side to walk.    DATA:    Lab Results   Component Value Date    GLUCOSE 274 (H) 05/13/2019    CALCIUM 9.7 05/13/2019     05/13/2019    K 4.5 05/13/2019    CO2 28.0 05/13/2019    CL 99 05/13/2019    BUN 19 05/13/2019    CREATININE 0.82 05/13/2019    EGFRIFAFRI 97 03/29/2019    EGFRIFNONA 66 05/13/2019    BCR 23.2 05/13/2019    ANIONGAP 10.0 05/13/2019     Lab Results   Component Value Date    WBC 6.05 05/13/2019    HGB 14.5 05/13/2019    HCT 44.6 05/13/2019    MCV 93.1 05/13/2019     05/13/2019     No results found for: CHOL  Lab Results   Component Value Date    HDL 73 (H) 03/29/2018    HDL 80 (H) 03/03/2016    HDL 77 (H) 01/06/2015     Lab Results   Component Value Date     (H) 03/29/2018    LDL 96 03/03/2016    LDL 94 01/06/2015     Lab Results   Component Value Date    TRIG 62 03/29/2018    TRIG 55 03/03/2016     Lab Results   Component Value " Date    HGBA1C 5.80 (H) 03/06/2017     Lab Results   Component Value Date    INR 1.00 05/13/2019    PROTIME 12.9 05/13/2019       Lab review: CBC, BMP normal,    Imaging review: I personally reviewed her CT head and CTA head neck and discussed these with the reading neuroradiologist Dr Trujillo.  CT head appears essentially normal, CT perfusion shows no clear cortical mismatch, CTA head neck shows mild ectasia of the vertebral artery on the left side.    Impression:  1) transient ischemic attack, likely right-sided small vessel  2) palpitations    Comment: Suspect TIA, likely small vessel, potentially embolic given palpitations recently.  Plan to get MRI brain.  CTA is essentially negative.    PLAN:  1.  Aspirin, statin  2.  500 cc bolus, IV fluids, bedrest with bathroom privileges x24 hours then out of bed with therapy  3.  MRI brain without  4.  2D echo    We will follow

## 2019-05-13 NOTE — H&P
"    Patient Name:  Cristy Wilde  YOB: 1934  MRN:  [id number]  Admit Date:  5/13/2019  Patient Care Team:  Jerardo Kaur MD as PCP - General  Jerardo Kaur MD as PCP - Family Medicine  Jerardo Kaur MD as PCP - Albino Orozco MD as Consulting Physician (Ophthalmology)  MAE Davis MD as Consulting Physician (Ophthalmology)      Subjective   History Present Illness     Chief Complaint   Patient presents with   • Neuro Deficit(s)       Ms. Wilde is a 84 y.o.with a history of osteoporosis and prediabetes who presents to [facility] complaining of left-sided weakness and slurred speech.  Onset was this morning and symptoms are still intermittent.  She got up this morning and brushed her hair when she developed left-sided weakness and lost her balance.  She called her son, who brought her to the ED. While they were en route, her son reports dysarthria and states she sounded \"drunk\".  Slurred speech has resolved but left sided weakness/feeling of being off balance is still present. She denies facial droop, chest pain, palpitations, shortness of breath, edema, dizziness, lightheadedness, vision changes and falls.  NIH score upon admission 0.     Review of Systems   Constitutional: Negative for activity change and appetite change.   HENT: Negative for congestion and dental problem.    Eyes: Negative for discharge and itching.   Respiratory: Negative for chest tightness and shortness of breath.    Cardiovascular: Positive for palpitations. Negative for chest pain and leg swelling.   Gastrointestinal: Negative for abdominal distention and abdominal pain.   Endocrine: Negative for cold intolerance and heat intolerance.   Genitourinary: Negative for difficulty urinating and dysuria.   Musculoskeletal: Positive for gait problem. Negative for back pain.   Skin: Negative for color change and pallor.   Allergic/Immunologic: Negative for environmental " allergies and food allergies.   Neurological: Positive for speech difficulty. Negative for dizziness, facial asymmetry and light-headedness.   Hematological: Negative for adenopathy. Does not bruise/bleed easily.   Psychiatric/Behavioral: Negative for confusion.        Personal History     Past Medical History:   Diagnosis Date   • Chronic constipation    • Chronic rhinitis     with noted vasomotor rhinitis   • Diverticulosis    • Dry eyes     sees dr norton   • Globus sensation    • Hearing loss     noted on 2015 audiology testing   • History of allergic rhinitis    • History of onychomycosis    • Internal hemorrhoids     grade 2   • Laryngitis, chronic    • Osteoarthritis, chronic     minimal sx controlled with exercise   • Osteoporosis    • Pancreatitis due to biliary obstruction 2013    relieved after lap lacho   • Postpartum depression    • Prediabetes    • Reaction, adjustment, with anxious, depressed mood      Past Surgical History:   Procedure Laterality Date   • CATARACT EXTRACTION     • CATARACT EXTRACTION BILATERAL W/ ANTERIOR VITRECTOMY  2009   • CHOLECYSTECTOMY  2013   • DILATION AND CURETTAGE, DIAGNOSTIC / THERAPEUTIC  1986   • EYE PTOSIS REPAIR Bilateral 2015   • REFRACTIVE SURGERY      2015 L and 2016 R   • TEAR DUCT SURGERY      2007 R and 2014 L; Dr. Davis   • TONSILLECTOMY  1957     Family History   Problem Relation Age of Onset   • Diabetes Mother    • Heart disease Mother    • Depression Father    • Parkinsonism Father    • Diabetes Sister    • Alzheimer's disease Brother    • Diabetes Brother    • Diabetes Maternal Grandmother    • Diabetes Brother    • Prostate cancer Brother    • Diabetes Sister    • No Known Problems Daughter    • No Known Problems Son      Social History     Tobacco Use   • Smoking status: Never Smoker   • Smokeless tobacco: Never Used   Substance Use Topics   • Alcohol use: Yes     Comment: < 1 drinks/ month; quit in 2018   • Drug use: No       (Not in a hospital  admission)  Allergies:    Allergies   Allergen Reactions   • Cefaclor    • Cephalexin    • Levofloxacin    • Penicillins        Objective    Objective     Vital Signs  Temp:  [98.1 °F (36.7 °C)] 98.1 °F (36.7 °C)  Heart Rate:  [] 92  Resp:  [16] 16  BP: (139-142)/(64-71) 142/64  SpO2:  [98 %] 98 %  on   ;   Device (Oxygen Therapy): room air  Body mass index is 20.9 kg/m².    Physical Exam   Constitutional: She is oriented to person, place, and time. She appears well-developed and well-nourished. No distress.   HENT:   Head: Normocephalic and atraumatic.   Eyes: Conjunctivae and EOM are normal.   Neck: Normal range of motion. Neck supple.   Cardiovascular: Normal rate and regular rhythm.   No murmur heard.  Pulmonary/Chest: Effort normal and breath sounds normal. No respiratory distress.   Abdominal: Soft. Bowel sounds are normal. She exhibits no distension. There is no tenderness.   Musculoskeletal: Normal range of motion. She exhibits no edema.   Neurological: She is alert and oriented to person, place, and time.   Skin: Skin is warm and dry.   Psychiatric: She has a normal mood and affect. Her behavior is normal.   Nursing note and vitals reviewed.      Results Review:  I reviewed the patient's new clinical results.  I reviewed the patient's new imaging results and agree with the interpretation.  I reviewed the patient's other test results and agree with the interpretation  I personally viewed and interpreted the patient's EKG/Telemetry data  Discussed with ED provider.    Lab Results (last 24 hours)     Procedure Component Value Units Date/Time    CBC & Differential [[id number]] Collected:  05/13/19 1414    Specimen:  Blood Updated:  05/13/19 1422    Narrative:       The following orders were created for panel order CBC & Differential.  Procedure                               Abnormality         Status                     ---------                               -----------         ------                      CBC Auto Differential[[id number]]        Abnormal            Final result                 Please view results for these tests on the individual orders.    Comprehensive Metabolic Panel [[id number]]  (Abnormal) Collected:  05/13/19 1414    Specimen:  Blood Updated:  05/13/19 1451     Glucose 274 mg/dL      BUN 19 mg/dL      Creatinine 0.82 mg/dL      Sodium 137 mmol/L      Potassium 4.5 mmol/L      Chloride 99 mmol/L      CO2 28.0 mmol/L      Calcium 9.7 mg/dL      Total Protein 6.7 g/dL      Albumin 3.90 g/dL      ALT (SGPT) 17 U/L      AST (SGOT) 21 U/L      Alkaline Phosphatase 63 U/L      Total Bilirubin 0.4 mg/dL      eGFR Non African Amer 66 mL/min/1.73      Globulin 2.8 gm/dL      A/G Ratio 1.4 g/dL      BUN/Creatinine Ratio 23.2     Anion Gap 10.0 mmol/L     Narrative:       GFR Normal >60  Chronic Kidney Disease <60  Kidney Failure <15    Troponin [[id number]]  (Normal) Collected:  05/13/19 1414    Specimen:  Blood Updated:  05/13/19 1451     Troponin T <0.010 ng/mL     Narrative:       Troponin T Reference Range:  <= 0.03 ng/mL-   Negative for AMI  >0.03 ng/mL-     Abnormal for myocardial necrosis.  Clinicians would have to utilize clinical acumen, EKG, Troponin and serial changes to determine if it is an Acute Myocardial Infarction or myocardial injury due to an underlying chronic condition.     CBC Auto Differential [[id number]]  (Abnormal) Collected:  05/13/19 1414    Specimen:  Blood Updated:  05/13/19 1422     WBC 6.05 10*3/mm3      RBC 4.79 10*6/mm3      Hemoglobin 14.5 g/dL      Hematocrit 44.6 %      MCV 93.1 fL      MCH 30.3 pg      MCHC 32.5 g/dL      RDW 13.6 %      RDW-SD 47.5 fl      MPV 8.7 fL      Platelets 207 10*3/mm3      Neutrophil % 78.1 %      Lymphocyte % 12.7 %      Monocyte % 6.3 %      Eosinophil % 1.8 %      Basophil % 0.8 %      Immature Grans % 0.3 %      Neutrophils, Absolute 4.72 10*3/mm3      Lymphocytes, Absolute 0.77 10*3/mm3      Monocytes, Absolute 0.38 10*3/mm3       Eosinophils, Absolute 0.11 10*3/mm3      Basophils, Absolute 0.05 10*3/mm3      Immature Grans, Absolute 0.02 10*3/mm3      nRBC 0.0 /100 WBC     POC Glucose Once [[id number]]  (Abnormal) Collected:  05/13/19 1414    Specimen:  Blood Updated:  05/13/19 1415     Glucose 235 mg/dL     Protime-INR [[id number]]  (Normal) Collected:  05/13/19 1415    Specimen:  Blood Updated:  05/13/19 1441     Protime 12.9 Seconds      INR 1.00    aPTT [[id number]]  (Normal) Collected:  05/13/19 1415    Specimen:  Blood Updated:  05/13/19 1441     PTT 24.9 seconds           Imaging Results (last 24 hours)     Procedure Component Value Units Date/Time    XR Chest 1 View [[id number]] Collected:  05/13/19 1515     Updated:  05/13/19 1520    Narrative:       XR CHEST 1 VW-     Clinical: Stroke protocol, neural defects     COMPARISON 03/31/2013     FINDINGS: There is hyperaeration. No edema effusion or consolidation.  There is cardiac enlargement. Mediastinum is stable.     CONCLUSION: No acute cardiovascular or pulmonary process is  demonstrated. There is hyperaeration and cardiomegaly.     This report was finalized on 5/13/2019 3:17 PM by Dr. Alexis Crawley M.D.       CT Angiogram Head With & Without Contrast [[id number]] Resulted:  05/13/19 1520     Updated:  05/13/19 1432    CT Angiogram Neck With & Without Contrast [[id number]] Updated:  05/13/19 1432    CT Cerebral Perfusion With & Without Contrast [[id number]] Updated:  05/13/19 1432               ECG 12 Lead   Preliminary Result   HEART RATE= 98  bpm   RR Interval= 616  ms   FL Interval= 192  ms   P Horizontal Axis= 0  deg   P Front Axis= 59  deg   QRSD Interval= 85  ms   QT Interval= 367  ms   QRS Axis= 44  deg   T Wave Axis= 83  deg   - ABNORMAL ECG -   Sinus rhythm   Nonspecific repol abnormality, lateral leads   Electronically Signed By:    Date and Time of Study: 2019-05-13 14:49:20           Assessment/Plan     Active Hospital Problems    Diagnosis POA   • TIA (transient ischemic  attack) [G45.9] Yes     · TIA-neurology plans to obtain MRI of the brain without contrast and a 2D echo. Appreciate recommendations.  · Hyperglycemia upon admission.  A1c ordered.  · I discussed the patients findings and my recommendations with patient, family and nursing staff.    VTE Prophylaxis - SCDs .  Code Status - Full code.       DIANNE Funes  [facility]  05/13/19  3:33 PM    As above.  Pt examined and test results noted.  Pt w no new c/o and states she feels back to her baseline.  Reported to have passed bedside swallow in ER.  No appreciable lateralizing signs on exam.     Darryl Davidson MD  18:56

## 2019-05-13 NOTE — PROGRESS NOTES
Clinical Pharmacy Services: Medication History    Cristy Wilde is a 84 y.o. female presenting to [facility] for   Chief Complaint   Patient presents with   • Neuro Deficit(s)       She  has a past medical history of Chronic constipation, Chronic rhinitis, Diverticulosis, Dry eyes, Globus sensation, Hearing loss, History of allergic rhinitis, History of onychomycosis, Internal hemorrhoids, Laryngitis, chronic, Osteoarthritis, chronic, Osteoporosis, Pancreatitis due to biliary obstruction (2013), Postpartum depression, Prediabetes, and Reaction, adjustment, with anxious, depressed mood.    Allergies as of 05/13/2019 - Reviewed 05/13/2019   Allergen Reaction Noted   • Cefaclor  03/10/2016   • Cephalexin  03/10/2016   • Levofloxacin  03/10/2016   • Penicillins  03/10/2016       Medication information was obtained from: Patient  Pharmacy and Phone Number: [phone]    Prior to Admission Medications     Prescriptions Last Dose Informant Patient Reported? Taking?    CALCIUM-VITAMIN D PO  Self Yes Yes    Take 1 tablet by mouth 2 (Two) Times a Day. Dose unknown    cycloSPORINE (RESTASIS) 0.05 % ophthalmic emulsion  Self Yes Yes    Administer 1 drop to both eyes 2 (Two) Times a Day.    denosumab (PROLIA) 60 MG/ML solution syringe  Self Yes Yes    Inject 60 mg under the skin into the appropriate area as directed Every 6 (Six) Months.    docusate sodium (COLACE) 100 MG capsule  Self Yes Yes    Take 100 mg by mouth Every Night.    escitalopram (LEXAPRO) 10 MG tablet  Self Yes Yes    Take 10 mg by mouth Daily.    Magnesium 250 MG tablet  Self Yes Yes    Take 1 tablet by mouth Daily.    Omega-3 Fatty Acids (FISH OIL PO)  Self Yes Yes    Take 1 capsule by mouth Daily. Dose unknown            Medication notes: Removed per patient, no longer taking: Amlactin, B Complex, Zyrtec and Claritin, Meclizine, and Multivitamin  Stool Softener added    This medication list is complete to the best of my knowledge as  of 5/13/2019    Please call if questions.    Naz Dunaway, Medication History Technician  5/13/2019 3:42 PM

## 2019-05-14 ENCOUNTER — APPOINTMENT (OUTPATIENT)
Dept: CARDIOLOGY | Facility: HOSPITAL | Age: 84
End: 2019-05-14

## 2019-05-14 VITALS
RESPIRATION RATE: 18 BRPM | BODY MASS INDEX: 21.09 KG/M2 | HEIGHT: 63 IN | TEMPERATURE: 97.8 F | OXYGEN SATURATION: 99 % | SYSTOLIC BLOOD PRESSURE: 128 MMHG | HEART RATE: 78 BPM | DIASTOLIC BLOOD PRESSURE: 63 MMHG | WEIGHT: 119 LBS

## 2019-05-14 PROBLEM — I63.9: Status: ACTIVE | Noted: 2019-05-14

## 2019-05-14 LAB
ANION GAP SERPL CALCULATED.3IONS-SCNC: 6.1 MMOL/L
ASCENDING AORTA: 2.8 CM
BH CV ECHO MEAS - ACS: 2 CM
BH CV ECHO MEAS - AI DEC SLOPE: 96.4 CM/SEC^2
BH CV ECHO MEAS - AI MAX PG: 41.7 MMHG
BH CV ECHO MEAS - AI MAX VEL: 323 CM/SEC
BH CV ECHO MEAS - AI P1/2T: 981.4 MSEC
BH CV ECHO MEAS - AO MAX PG: 4 MMHG
BH CV ECHO MEAS - AO MEAN PG (FULL): 1 MMHG
BH CV ECHO MEAS - AO MEAN PG: 2 MMHG
BH CV ECHO MEAS - AO ROOT AREA (BSA CORRECTED): 1.8
BH CV ECHO MEAS - AO ROOT AREA: 6.2 CM^2
BH CV ECHO MEAS - AO ROOT DIAM: 2.8 CM
BH CV ECHO MEAS - AO V2 MAX: 94 CM/SEC
BH CV ECHO MEAS - AO V2 MEAN: 73.4 CM/SEC
BH CV ECHO MEAS - AO V2 VTI: 24.7 CM
BH CV ECHO MEAS - AVA(I,A): 1.8 CM^2
BH CV ECHO MEAS - AVA(I,D): 1.8 CM^2
BH CV ECHO MEAS - BSA(HAYCOCK): 1.5 M^2
BH CV ECHO MEAS - BSA: 1.6 M^2
BH CV ECHO MEAS - BZI_BMI: 21.1 KILOGRAMS/M^2
BH CV ECHO MEAS - BZI_METRIC_HEIGHT: 160 CM
BH CV ECHO MEAS - BZI_METRIC_WEIGHT: 54 KG
BH CV ECHO MEAS - EDV(MOD-SP2): 52 ML
BH CV ECHO MEAS - EDV(MOD-SP4): 44 ML
BH CV ECHO MEAS - EDV(TEICH): 84.4 ML
BH CV ECHO MEAS - EF(CUBED): 61.5 %
BH CV ECHO MEAS - EF(MOD-BP): 54 %
BH CV ECHO MEAS - EF(MOD-SP2): 57.7 %
BH CV ECHO MEAS - EF(MOD-SP4): 50 %
BH CV ECHO MEAS - EF(TEICH): 53.3 %
BH CV ECHO MEAS - ESV(MOD-SP2): 22 ML
BH CV ECHO MEAS - ESV(MOD-SP4): 22 ML
BH CV ECHO MEAS - ESV(TEICH): 39.4 ML
BH CV ECHO MEAS - FS: 27.3 %
BH CV ECHO MEAS - IVS/LVPW: 1.2
BH CV ECHO MEAS - IVSD: 0.93 CM
BH CV ECHO MEAS - LAT PEAK E' VEL: 7 CM/SEC
BH CV ECHO MEAS - LV DIASTOLIC VOL/BSA (35-75): 28.4 ML/M^2
BH CV ECHO MEAS - LV MASS(C)D: 113.9 GRAMS
BH CV ECHO MEAS - LV MASS(C)DI: 73.5 GRAMS/M^2
BH CV ECHO MEAS - LV MEAN PG: 1 MMHG
BH CV ECHO MEAS - LV SYSTOLIC VOL/BSA (12-30): 14.2 ML/M^2
BH CV ECHO MEAS - LV V1 MAX: 71 CM/SEC
BH CV ECHO MEAS - LV V1 MEAN: 46.6 CM/SEC
BH CV ECHO MEAS - LV V1 VTI: 15.9 CM
BH CV ECHO MEAS - LVIDD: 4.3 CM
BH CV ECHO MEAS - LVIDS: 3.2 CM
BH CV ECHO MEAS - LVLD AP2: 6.4 CM
BH CV ECHO MEAS - LVLD AP4: 5.7 CM
BH CV ECHO MEAS - LVLS AP2: 5.7 CM
BH CV ECHO MEAS - LVLS AP4: 5.6 CM
BH CV ECHO MEAS - LVOT AREA (M): 2.8 CM^2
BH CV ECHO MEAS - LVOT AREA: 2.8 CM^2
BH CV ECHO MEAS - LVOT DIAM: 1.9 CM
BH CV ECHO MEAS - LVPWD: 0.75 CM
BH CV ECHO MEAS - MED PEAK E' VEL: 7 CM/SEC
BH CV ECHO MEAS - MR MAX PG: 118.8 MMHG
BH CV ECHO MEAS - MR MAX VEL: 545 CM/SEC
BH CV ECHO MEAS - MV A DUR: 0.12 SEC
BH CV ECHO MEAS - MV A MAX VEL: 86.9 CM/SEC
BH CV ECHO MEAS - MV DEC SLOPE: 369 CM/SEC^2
BH CV ECHO MEAS - MV DEC TIME: 0.2 SEC
BH CV ECHO MEAS - MV E MAX VEL: 80 CM/SEC
BH CV ECHO MEAS - MV E/A: 0.92
BH CV ECHO MEAS - MV MEAN PG: 2 MMHG
BH CV ECHO MEAS - MV P1/2T MAX VEL: 82.9 CM/SEC
BH CV ECHO MEAS - MV P1/2T: 65.8 MSEC
BH CV ECHO MEAS - MV V2 MEAN: 60.8 CM/SEC
BH CV ECHO MEAS - MV V2 VTI: 29.4 CM
BH CV ECHO MEAS - MVA P1/2T LCG: 2.7 CM^2
BH CV ECHO MEAS - MVA(P1/2T): 3.3 CM^2
BH CV ECHO MEAS - MVA(VTI): 1.5 CM^2
BH CV ECHO MEAS - PA MAX PG: 2.3 MMHG
BH CV ECHO MEAS - PA V2 MAX: 76 CM/SEC
BH CV ECHO MEAS - QP/QS: 0.89
BH CV ECHO MEAS - RAP SYSTOLE: 3 MMHG
BH CV ECHO MEAS - RV MEAN PG: 1 MMHG
BH CV ECHO MEAS - RV V1 MEAN: 47.8 CM/SEC
BH CV ECHO MEAS - RV V1 VTI: 15.8 CM
BH CV ECHO MEAS - RVOT AREA: 2.5 CM^2
BH CV ECHO MEAS - RVOT DIAM: 1.8 CM
BH CV ECHO MEAS - RVSP: 26 MMHG
BH CV ECHO MEAS - SI(AO): 98.1 ML/M^2
BH CV ECHO MEAS - SI(CUBED): 32.2 ML/M^2
BH CV ECHO MEAS - SI(LVOT): 29.1 ML/M^2
BH CV ECHO MEAS - SI(MOD-SP2): 19.3 ML/M^2
BH CV ECHO MEAS - SI(MOD-SP4): 14.2 ML/M^2
BH CV ECHO MEAS - SI(TEICH): 29 ML/M^2
BH CV ECHO MEAS - SUP REN AO DIAM: 1.6 CM
BH CV ECHO MEAS - SV(AO): 152.1 ML
BH CV ECHO MEAS - SV(CUBED): 49.9 ML
BH CV ECHO MEAS - SV(LVOT): 45.1 ML
BH CV ECHO MEAS - SV(MOD-SP2): 30 ML
BH CV ECHO MEAS - SV(MOD-SP4): 22 ML
BH CV ECHO MEAS - SV(RVOT): 40.2 ML
BH CV ECHO MEAS - SV(TEICH): 45 ML
BH CV ECHO MEAS - TAPSE (>1.6): 1.9 CM2
BH CV ECHO MEAS - TR MAX VEL: 243 CM/SEC
BH CV ECHO MEASUREMENTS AVERAGE E/E' RATIO: 11.43
BH CV XLRA - RV BASE: 2.8 CM
BH CV XLRA - TDI S': 9 CM/SEC
BUN BLD-MCNC: 17 MG/DL (ref 8–23)
BUN/CREAT SERPL: 22.4 (ref 7–25)
CALCIUM SPEC-SCNC: 8.3 MG/DL (ref 8.6–10.5)
CHLORIDE SERPL-SCNC: 103 MMOL/L (ref 98–107)
CHOLEST SERPL-MCNC: 143 MG/DL (ref 0–200)
CO2 SERPL-SCNC: 26.9 MMOL/L (ref 22–29)
CREAT BLD-MCNC: 0.76 MG/DL (ref 0.57–1)
CREAT BLDA-MCNC: 0.9 MG/DL (ref 0.6–1.3)
GFR SERPL CREATININE-BSD FRML MDRD: 73 ML/MIN/1.73
GLUCOSE BLD-MCNC: 108 MG/DL (ref 65–99)
GLUCOSE BLDC GLUCOMTR-MCNC: 102 MG/DL (ref 70–130)
GLUCOSE BLDC GLUCOMTR-MCNC: 155 MG/DL (ref 70–130)
GLUCOSE BLDC GLUCOMTR-MCNC: 190 MG/DL (ref 70–130)
HBA1C MFR BLD: 6.1 % (ref 4.8–5.6)
HDLC SERPL-MCNC: 60 MG/DL (ref 40–60)
LDLC SERPL CALC-MCNC: 71 MG/DL (ref 0–100)
LDLC/HDLC SERPL: 1.18 {RATIO}
LEFT ATRIUM VOLUME INDEX: 22 ML/M2
MAXIMAL PREDICTED HEART RATE: 136 BPM
POTASSIUM BLD-SCNC: 3.7 MMOL/L (ref 3.5–5.2)
SINUS: 2.9 CM
SODIUM BLD-SCNC: 136 MMOL/L (ref 136–145)
STJ: 2.4 CM
STRESS TARGET HR: 116 BPM
TRIGL SERPL-MCNC: 62 MG/DL (ref 0–150)
VIT B12 BLD-MCNC: 367 PG/ML (ref 211–946)
VLDLC SERPL-MCNC: 12.4 MG/DL (ref 5–40)

## 2019-05-14 PROCEDURE — 97161 PT EVAL LOW COMPLEX 20 MIN: CPT

## 2019-05-14 PROCEDURE — 36415 COLL VENOUS BLD VENIPUNCTURE: CPT | Performed by: PSYCHIATRY & NEUROLOGY

## 2019-05-14 PROCEDURE — 82962 GLUCOSE BLOOD TEST: CPT

## 2019-05-14 PROCEDURE — 99214 OFFICE O/P EST MOD 30 MIN: CPT | Performed by: NURSE PRACTITIONER

## 2019-05-14 PROCEDURE — 80048 BASIC METABOLIC PNL TOTAL CA: CPT | Performed by: HOSPITALIST

## 2019-05-14 PROCEDURE — 93306 TTE W/DOPPLER COMPLETE: CPT

## 2019-05-14 PROCEDURE — 93306 TTE W/DOPPLER COMPLETE: CPT | Performed by: INTERNAL MEDICINE

## 2019-05-14 PROCEDURE — 80061 LIPID PANEL: CPT | Performed by: PSYCHIATRY & NEUROLOGY

## 2019-05-14 PROCEDURE — 97110 THERAPEUTIC EXERCISES: CPT

## 2019-05-14 PROCEDURE — 86901 BLOOD TYPING SEROLOGIC RH(D): CPT

## 2019-05-14 PROCEDURE — 83036 HEMOGLOBIN GLYCOSYLATED A1C: CPT | Performed by: PSYCHIATRY & NEUROLOGY

## 2019-05-14 PROCEDURE — 86900 BLOOD TYPING SEROLOGIC ABO: CPT

## 2019-05-14 PROCEDURE — 92610 EVALUATE SWALLOWING FUNCTION: CPT

## 2019-05-14 RX ORDER — ATORVASTATIN CALCIUM 40 MG/1
40 TABLET, FILM COATED ORAL NIGHTLY
Qty: 30 TABLET | Refills: 0 | Status: SHIPPED | OUTPATIENT
Start: 2019-05-14 | End: 2019-06-20 | Stop reason: SDUPTHER

## 2019-05-14 RX ORDER — CLOPIDOGREL BISULFATE 75 MG/1
75 TABLET ORAL DAILY
Qty: 30 TABLET | Refills: 0 | Status: SHIPPED | OUTPATIENT
Start: 2019-05-15 | End: 2019-09-09

## 2019-05-14 RX ORDER — CLOPIDOGREL BISULFATE 75 MG/1
75 TABLET ORAL DAILY
Status: DISCONTINUED | OUTPATIENT
Start: 2019-05-15 | End: 2019-05-14 | Stop reason: HOSPADM

## 2019-05-14 RX ORDER — ATORVASTATIN CALCIUM 20 MG/1
40 TABLET, FILM COATED ORAL NIGHTLY
Status: DISCONTINUED | OUTPATIENT
Start: 2019-05-14 | End: 2019-05-14 | Stop reason: HOSPADM

## 2019-05-14 RX ORDER — CLOPIDOGREL BISULFATE 75 MG/1
300 TABLET ORAL ONCE
Status: COMPLETED | OUTPATIENT
Start: 2019-05-14 | End: 2019-05-14

## 2019-05-14 RX ORDER — ASPIRIN 81 MG/1
81 TABLET ORAL DAILY
Qty: 30 TABLET | Refills: 0 | Status: SHIPPED | OUTPATIENT
Start: 2019-05-14

## 2019-05-14 RX ADMIN — ASPIRIN 81 MG: 81 TABLET, CHEWABLE ORAL at 09:29

## 2019-05-14 RX ADMIN — ESCITALOPRAM 10 MG: 10 TABLET, FILM COATED ORAL at 09:29

## 2019-05-14 RX ADMIN — SODIUM CHLORIDE, PRESERVATIVE FREE 3 ML: 5 INJECTION INTRAVENOUS at 09:31

## 2019-05-14 RX ADMIN — CLOPIDOGREL 300 MG: 75 TABLET, FILM COATED ORAL at 09:29

## 2019-05-14 RX ADMIN — CYCLOSPORINE 1 DROP: 0.5 EMULSION OPHTHALMIC at 09:29

## 2019-05-14 NOTE — PLAN OF CARE
Problem: Patient Care Overview  Goal: Plan of Care Review  Outcome: Ongoing (interventions implemented as appropriate)   05/14/19 3280   Coping/Psychosocial   Plan of Care Reviewed With patient   Plan of Care Review   Progress no change   OTHER   Outcome Summary Bedside Swallow eval completed. No overt s/s of aspiration noted. Recommend pt continue on a regular/thin diet. ST is not indicated at this time. Please reconsult if needed.

## 2019-05-14 NOTE — THERAPY DISCHARGE NOTE
Acute Care - Physical Therapy Initial Eval/Discharge  [facility]     Patient Name: Cristy Wilde  : 1934  MRN: [id number]  Today's Date: 2019   Onset of Illness/Injury or Date of Surgery: 19  Date of Referral to PT: 19  Referring Physician: Leonides Lacy      Admit Date: 2019    Visit Dx:    ICD-10-CM ICD-9-CM   1. TIA (transient ischemic attack) G45.9 435.9     Patient Active Problem List   Diagnosis   • Chronic rhinitis   • Dry eyes   • Hearing loss   • Osteoporosis   • Adjustment disorder with mixed anxiety and depressed mood   • Onychomycosis   • IFG (impaired fasting glucose)   • JESUS (generalized anxiety disorder)   • Pruritus   • Seborrheic keratoses   • Stress incontinence in female   • Xerosis of skin   • Chronic midline low back pain without sciatica   • Osteoporosis   • Vertigo   • TIA (transient ischemic attack)     Past Medical History:   Diagnosis Date   • Chronic constipation    • Chronic rhinitis     with noted vasomotor rhinitis   • Diverticulosis    • Dry eyes     sees dr norton   • Globus sensation    • Hearing loss     noted on  audiology testing   • History of allergic rhinitis    • History of onychomycosis    • Internal hemorrhoids     grade 2   • Laryngitis, chronic    • Osteoarthritis, chronic     minimal sx controlled with exercise   • Osteoporosis    • Pancreatitis due to biliary obstruction     relieved after lap lacho   • Postpartum depression    • Prediabetes    • Reaction, adjustment, with anxious, depressed mood      Past Surgical History:   Procedure Laterality Date   • CATARACT EXTRACTION     • CATARACT EXTRACTION BILATERAL W/ ANTERIOR VITRECTOMY     • CHOLECYSTECTOMY     • DILATION AND CURETTAGE, DIAGNOSTIC / THERAPEUTIC     • EYE PTOSIS REPAIR Bilateral    • REFRACTIVE SURGERY       L and  R   • TEAR DUCT SURGERY       R and  L; Dr. Davis   • TONSILLECTOMY            PT ASSESSMENT (last 12  "hours)      Physical Therapy Evaluation     Row Name 05/14/19 0852          PT Evaluation Time/Intention    Subjective Information  complains of;fatigue  -MS     Document Type  discharge evaluation/summary  -MS     Mode of Treatment  physical therapy;individual therapy  -MS     Patient Effort  excellent  -MS     Comment  Pt. reports feeling \"much better\" this day compared to yesterday. Pt. reports not numbness or weakness in her Left side this day.  -MS     Row Name 05/14/19 0852          General Information    Onset of Illness/Injury or Date of Surgery  05/13/19  -MS     Referring Physician  Leonides Lacy  -MS     Patient Observations  agree to therapy;cooperative  -MS     Prior Level of Function  independent:  -MS     Equipment Currently Used at Home  none  -MS     Pertinent History of Current Functional Problem  Pt. admitted with Left sided weakness and numbness in her BLE's  -MS     Risks Reviewed  patient:  -MS     Benefits Reviewed  patient:  -MS     Barriers to Rehab  none identified  -MS     Row Name 05/14/19 0852          Cognitive Assessment/Intervention- PT/OT    Orientation Status (Cognition)  oriented x 3  -MS     Follows Commands (Cognition)  WNL  -MS     Personal Safety Interventions  fall prevention program maintained;gait belt;nonskid shoes/slippers when out of bed;supervised activity  -MS     Row Name 05/14/19 0852          Bed Mobility Assessment/Treatment    Bed Mobility Assessment/Treatment  supine-sit;sit-supine  -MS     Supine-Sit Cisco (Bed Mobility)  independent  -MS     Sit-Supine Cisco (Bed Mobility)  independent  -MS     Row Name 05/14/19 0852          Transfer Assessment/Treatment    Transfer Assessment/Treatment  sit-stand transfer;stand-sit transfer  -MS     Sit-Stand Cisco (Transfers)  independent  -MS     Stand-Sit Cisco (Transfers)  independent  -MS     Row Name 05/14/19 0852          Gait/Stairs Assessment/Training    Cisco Level (Gait)  " independent  -MS     Distance in Feet (Gait)  300 feet  -MS     Pattern (Gait)  step-through  -MS     Comment (Gait/Stairs)  No balance deficits noted. Pt. reports she feels back to baseline.  -MS     Row Name 05/14/19 0852          General ROM    GENERAL ROM COMMENTS  BUE/LE (WFL's)  -MS     Row Name 05/14/19 0852          MMT (Manual Muscle Testing)    General MMT Comments  BUE/LE (4-/5)  -MS     Row Name 05/14/19 0852          Therapeutic Exercise    Comment (Therapeutic Exercise)  BLE standing ther. ex. program x 10 reps completed (Heel raises, Mini-squats, Hip Abduction)  -MS     Row Name 05/14/19 0852          Pain Assessment    Additional Documentation  Pain Scale: Numbers Pre/Post-Treatment (Group)  -MS     Row Name 05/14/19 0852          Pain Scale: Numbers Pre/Post-Treatment    Pain Scale: Numbers, Pretreatment  0/10 - no pain  -MS     Pain Scale: Numbers, Post-Treatment  0/10 - no pain  -MS     Row Name 05/14/19 0852          Physical Therapy Clinical Impression    Date of Referral to PT  05/13/19  -MS     Criteria for Skilled Interventions Met (PT Clinical Impression)  no problems identified which require skilled intervention  -MS     Row Name 05/14/19 0852          Positioning and Restraints    Pre-Treatment Position  in bed  -MS     Post Treatment Position  bed  -MS     In Bed  notified nsg;supine;call light within reach;encouraged to call for assist;exit alarm on All lines intact.  -MS       User Key  (r) = Recorded By, (t) = Taken By, (c) = Cosigned By    Initials Name Provider Type    Rubén Hall, PT Physical Therapist          Physical Therapy Education     Title: PT OT SLP Therapies (Resolved)     Topic: Physical Therapy (Resolved)     Point: Mobility training (Resolved)     Learning Progress Summary           Patient Acceptance, E,D, VU,DU by MS at 5/14/2019  8:56 AM                   Point: Home exercise program (Resolved)     Learning Progress Summary           Patient Acceptance, E,D,  ROMEL,DU by MS at 5/14/2019  8:56 AM                   Point: Body mechanics (Resolved)     Learning Progress Summary           Patient Acceptance, E,D, VU,DU by MS at 5/14/2019  8:56 AM                   Point: Precautions (Resolved)     Learning Progress Summary           Patient Acceptance, E,D, VU,DU by MS at 5/14/2019  8:56 AM                               User Key     Initials Effective Dates Name Provider Type Discipline    MS 04/03/18 -  Rubén Mccurdy PT Physical Therapist PT                PT Recommendation and Plan  Anticipated Discharge Disposition (PT): home  Outcome Summary/Treatment Plan (PT)  Anticipated Discharge Disposition (PT): home  Plan of Care Reviewed With: patient  Outcome Summary: Pt. is currently independent with functional mobility at this time and has no further questions/concerns regarding functional mobility or home safety.  Encouraged pt. to continue ambulation with nursing staff throughout the day while here in the hospital.  Will sign off.     Outcome Measures     Row Name 05/14/19 0800             How much help from another person do you currently need...    Turning from your back to your side while in flat bed without using bedrails?  4  -MS      Moving from lying on back to sitting on the side of a flat bed without bedrails?  4  -MS      Moving to and from a bed to a chair (including a wheelchair)?  4  -MS      Standing up from a chair using your arms (e.g., wheelchair, bedside chair)?  4  -MS      Climbing 3-5 steps with a railing?  4  -MS      To walk in hospital room?  4  -MS      AM-PAC 6 Clicks Score  24  -MS         Functional Assessment    Outcome Measure Options  AM-PAC 6 Clicks Basic Mobility (PT)  -MS        User Key  (r) = Recorded By, (t) = Taken By, (c) = Cosigned By    Initials Name Provider Type    MS Rubén Mccurdy, PT Physical Therapist           Time Calculation:   PT Charges     Row Name 05/14/19 0858             Time Calculation    Start Time  0830  -MS       Stop Time  0848  -MS      Time Calculation (min)  18 min  -MS      PT Received On  05/14/19  -MS         Time Calculation- PT    Total Timed Code Minutes- PT  15 minute(s)  -MS        User Key  (r) = Recorded By, (t) = Taken By, (c) = Cosigned By    Initials Name Provider Type    Rubén Hall, PT Physical Therapist        Therapy Charges for Today     Code Description Service Date Service Provider Modifiers Qty    38110761384 HC PT EVAL LOW COMPLEXITY 1 5/14/2019 Rubén Mccurdy, PT GP 1    92662027258 HC PT THER PROC EA 15 MIN 5/14/2019 Rubén Mccurdy, PT GP 1          PT G-Codes  Outcome Measure Options: AM-PAC 6 Clicks Basic Mobility (PT)  AM-PAC 6 Clicks Score: 24    PT Discharge Summary  Anticipated Discharge Disposition (PT): home  Reason for Discharge: Independent, At baseline function  Outcomes Achieved: Refer to plan of care for updates on goals achieved  Discharge Destination: Home    Rubén Mccurdy, PT  5/14/2019

## 2019-05-14 NOTE — NURSING NOTE
Request for stroke screening received 5/13/19 per Epic order set.  Noted OT and PT have seen and signed off per patient at baseline.  Signing off re: inpatient acute rehab.  Thank you--Maribel Limon,  Rehab Adm Nurse

## 2019-05-14 NOTE — SIGNIFICANT NOTE
05/14/19 1009   Rehab Time/Intention   Evaluation Not Performed other (see comments)  (PT denies need for OT. States just walked with PT and UE 's WFL . Pt states feels baseline function. D/C OT at this time. Nsg aware 910)   Rehab Treatment   Discipline occupational therapist

## 2019-05-14 NOTE — PLAN OF CARE
Problem: Patient Care Overview  Goal: Plan of Care Review   05/14/19 0857   Coping/Psychosocial   Plan of Care Reviewed With patient   OTHER   Outcome Summary Pt. is currently independent with functional mobility at this time and has no further questions/concerns regarding functional mobility or home safety. Encouraged pt. to continue ambulation with nursing staff throughout the day while here in the hospital. Will sign off.

## 2019-05-14 NOTE — DISCHARGE SUMMARY
"                                                                   PHYSICIAN DISCHARGE SUMMARY                                                                        [facility]    Patient Identification:  Name: Cristy Wilde  Age: 84 y.o.  Sex: female  :  1934  MRN: [id number]  Primary Care Physician: Jerardo Kaur MD    Admit date: 2019  Discharge date and time: 2019     Discharged Condition: good    Discharge Diagnoses:  CVA:  IGT:       Hospital Course:  Pleasant 84-year-old female who presents after an episode of left-sided weakness and transient slurred speech.  Please H&P for full details.  Patient was admitted and CVA protocol initiated.  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  Also noted was \"ocally ectatic segment of the left artery within the V1 segment and this measures up to 3 mm in diameter.  Echocardiogram was unremarkable.  Patient had full recovery her symptomatology.  Neurology is recommending dual antiplatelet therapy for 1 month and then to continue with aspirin alone thereafter.  Arrangements are being made for long-term Holter monitoring at discharge.    Blood glucose noted to be elevated on presentation also and A1c is within the range of impaired glucose tolerance.  Further outpatient monitoring course is recommended and she may benefit from Glucophage.      Consults:     Consults     Date and Time Order Name Status Description    2019 1448 [facility] (on-call MD unless specified) Completed     2019 1413 Inpatient Neurology Consult Stroke Completed     2019 1413 Inpatient Neurology Consult Stroke Completed             Discharge Exam:  Physical Exam   Afebrile vital signs stable.  Well-developed well-nourished female no apparent distress.  Lungs clear to auscultation good air movement.  Heart regular rate and rhythm.  Extremities with no clubbing cyanosis edema.  Alert oriented conversant cooperative pleasant with no " appreciable lateralizing signs.     Disposition:  Home    Patient Instructions:      Discharge Medications      New Medications      Instructions Start Date   aspirin 81 MG EC tablet   81 mg, Oral, Daily      atorvastatin 40 MG tablet  Commonly known as:  LIPITOR   40 mg, Oral, Nightly      clopidogrel 75 MG tablet  Commonly known as:  PLAVIX   75 mg, Oral, Daily   Start Date:  5/15/2019        Continue These Medications      Instructions Start Date   CALCIUM-VITAMIN D PO   1 tablet, Oral, 2 Times Daily, Dose unknown      cycloSPORINE 0.05 % ophthalmic emulsion  Commonly known as:  RESTASIS   1 drop, Both Eyes, 2 Times Daily      docusate sodium 100 MG capsule  Commonly known as:  COLACE   100 mg, Oral, Nightly      escitalopram 10 MG tablet  Commonly known as:  LEXAPRO   10 mg, Oral, Every Night at Bedtime      Magnesium 250 MG tablet   1 tablet, Oral, Nightly      PROLIA 60 MG/ML solution syringe  Generic drug:  denosumab   60 mg, Subcutaneous, Every 6 Months         Stop These Medications    FISH OIL PO          Diet Instructions     Diet: Cardiac      Discharge Diet:  Cardiac        No future appointments.  Additional Instructions for the Follow-ups that You Need to Schedule     Discharge Follow-up with PCP   As directed       Currently Documented PCP:    Jerardo Kaur MD    PCP Phone Number:    [phone]     Follow Up Details:  1 week         Discharge Follow-up with Specialty: Neurology   As directed      Specialty:  Neurology    Follow Up Details:  As directed.           Follow-up Information     Nilsa Aguilera APRN Follow up in 3 month(s).    Specialties:  Emergency Medicine, Neurology, Nurse Practitioner  Contact information:  [address] [facility] [address]  [place] [id number] [phone]             Jerardo Kaur MD .    Specialty:  Internal Medicine  Why:  1 week  Contact information:  [address] [facility] [address]  [place] [id number] [phone]                 Discharge Order (From admission, onward)     Start     Ordered    05/14/19 1642  Discharge patient  Once     Expected Discharge Date:  05/14/19    Discharge Disposition:  Home or Self Care    Physician of Record for Attribution - Please select from Treatment Team:  DARRYL DAVIDSON [[id number]]    Review needed by CMO to determine Physician of Record:  No       Question Answer Comment   Physician of Record for Attribution - Please select from Treatment Team DARRYL DAVIDSON    Review needed by CMO to determine Physician of Record No        05/14/19 1643            Total time spent discharging patient including evaluation,post hospitalization follow up,  medication and post hospitalization instructions and education total time exceeds 30 minutes.    Signed:  Darryl Davidson MD  5/14/2019  4:44 PM    EMR Dragon/Transcription disclaimer:   Much of this encounter note is an electronic transcription/translation of spoken language to printed text. The electronic translation of spoken language may permit erroneous, or at times, nonsensical words or phrases to be inadvertently transcribed; Although I have reviewed the note for such errors, some may still exist.

## 2019-05-14 NOTE — PLAN OF CARE
Problem: Patient Care Overview  Goal: Plan of Care Review  Outcome: Ongoing (interventions implemented as appropriate)   05/13/19 2003   Coping/Psychosocial   Plan of Care Reviewed With patient   Plan of Care Review   Progress no change   OTHER   Outcome Summary new admission from the er. patient alert and orientated. nih=1       Problem: Skin Injury Risk (Adult)  Goal: Identify Related Risk Factors and Signs and Symptoms  Outcome: Ongoing (interventions implemented as appropriate)    Goal: Skin Health and Integrity  Outcome: Ongoing (interventions implemented as appropriate)      Problem: Stroke (Ischemic) (Adult)  Goal: Signs and Symptoms of Listed Potential Problems Will be Absent, Minimized or Managed (Stroke)  Outcome: Ongoing (interventions implemented as appropriate)

## 2019-05-14 NOTE — THERAPY EVALUATION
Acute Care - Speech Language Pathology   Swallow Initial Evaluation [facility]     Patient Name: Cristy Wilde  : 1934  MRN: [id number]  Today's Date: 2019  Onset of Illness/Injury or Date of Surgery: 19     Referring Physician: Leonides Lacy      Admit Date: 2019    Visit Dx:     ICD-10-CM ICD-9-CM   1. TIA (transient ischemic attack) G45.9 435.9     Patient Active Problem List   Diagnosis   • Chronic rhinitis   • Dry eyes   • Hearing loss   • Osteoporosis   • Adjustment disorder with mixed anxiety and depressed mood   • Onychomycosis   • IFG (impaired fasting glucose)   • JESUS (generalized anxiety disorder)   • Pruritus   • Seborrheic keratoses   • Stress incontinence in female   • Xerosis of skin   • Chronic midline low back pain without sciatica   • Osteoporosis   • Vertigo   • TIA (transient ischemic attack)   • New infarction of cerebellum (CMS/HCC)     Past Medical History:   Diagnosis Date   • Chronic constipation    • Chronic rhinitis     with noted vasomotor rhinitis   • Diverticulosis    • Dry eyes     sees dr norton   • Globus sensation    • Hearing loss     noted on  audiology testing   • History of allergic rhinitis    • History of onychomycosis    • Internal hemorrhoids     grade 2   • Laryngitis, chronic    • Osteoarthritis, chronic     minimal sx controlled with exercise   • Osteoporosis    • Pancreatitis due to biliary obstruction     relieved after lap lacho   • Postpartum depression    • Prediabetes    • Reaction, adjustment, with anxious, depressed mood      Past Surgical History:   Procedure Laterality Date   • CATARACT EXTRACTION     • CATARACT EXTRACTION BILATERAL W/ ANTERIOR VITRECTOMY     • CHOLECYSTECTOMY     • DILATION AND CURETTAGE, DIAGNOSTIC / THERAPEUTIC     • EYE PTOSIS REPAIR Bilateral    • REFRACTIVE SURGERY       L and  R   • TEAR DUCT SURGERY       R and  L; Dr. Davis   • TONSILLECTOMY           SWALLOW EVALUATION (last 72 hours)      SLP Adult Swallow Evaluation     Row Name 05/14/19 1600                   Rehab Evaluation    Document Type  evaluation  -AW        Subjective Information  no complaints  -AW        Patient Observations  alert;cooperative;agree to therapy  -AW        Patient Effort  good  -AW        Symptoms Noted During/After Treatment  none  -AW           General Information    Patient Profile Reviewed  yes  -AW        Pertinent History Of Current Problem  Pt admitted with small cerebellar infarct.  -AW        Current Method of Nutrition  regular textures;thin liquids  -AW        Precautions/Limitations, Hearing  WFL  -AW        Prior Level of Function-Communication  WFL  -AW        Prior Level of Function-Swallowing  no diet consistency restrictions  -AW        Plans/Goals Discussed with  patient;agreed upon  -AW        Barriers to Rehab  none identified  -AW        Patient's Goals for Discharge  return home  -AW           Pain Assessment    Additional Documentation  Pain Scale: Numbers Pre/Post-Treatment (Group)  -AW           Pain Scale: Numbers Pre/Post-Treatment    Pain Scale: Numbers, Pretreatment  0/10 - no pain  -AW        Pain Scale: Numbers, Post-Treatment  0/10 - no pain  -AW           Oral Motor and Function    Dentition Assessment  natural, present and adequate  -AW        Secretion Management  WNL/WFL  -AW        Mucosal Quality  moist, healthy  -AW        Volitional Swallow  WFL  -AW        Volitional Cough  WFL  -AW           Oral Musculature and Cranial Nerve Assessment    Oral Motor General Assessment  WFL  -AW           General Eating/Swallowing Observations    Respiratory Support Currently in Use  room air  -AW        Eating/Swallowing Skills  self-fed  -AW        Positioning During Eating  upright in bed  -AW        Utensils Used  spoon;cup;straw  -AW        Consistencies Trialed  regular textures;soft textures;pureed;thin liquids mixed  -AW        Pre SpO2 (%)  99   -AW        Post SpO2 (%)  99  -AW           Clinical Swallow Eval    Oral Prep Phase  WFL  -AW        Oral Transit  WFL  -AW        Oral Residue  WFL  -AW        Pharyngeal Phase  no overt signs/symptoms of pharyngeal impairment  -AW        Clinical Swallow Evaluation Summary  Pt tolerated thins via cup and straw. Mastication and bolus control was functional for pureed, soft, mixed, and regular solids. Laryngeal elevation appeared adequate. Pt had throat clearing at times, which she reported is related to drainage she has had for a long time. Long discussion with pt re: dysphagia. She is familiar due to her 's medical status. Pt has read about it in stroke education book and asked questions.    -AW           Clinical Impression    SLP Swallowing Diagnosis  functional oral phase;functional pharyngeal phase  -AW        Functional Impact  no impact on function  -AW        Swallow Criteria for Skilled Therapeutic Interventions Met  baseline status  -AW           Recommendations    Therapy Frequency (Swallow)  evaluation only  -AW        SLP Diet Recommendation  regular textures;thin liquids  -AW        Recommended Precautions and Strategies  upright posture during/after eating;small bites of food and sips of liquid  -AW        SLP Rec. for Method of Medication Administration  meds whole;with thin liquids  -AW        Monitor for Signs of Aspiration  yes;notify SLP if any concerns  -AW        Anticipated Dischage Disposition  home  -AW          User Key  (r) = Recorded By, (t) = Taken By, (c) = Cosigned By    Initials Name Effective Dates    Glo Dimas, MS CCC-SLP 06/08/18 -           EDUCATION  The patient has been educated in the following areas:   Dysphagia (Swallowing Impairment) Oral Care/Hydration.    SLP Recommendation and Plan  SLP Swallowing Diagnosis: functional oral phase, functional pharyngeal phase  SLP Diet Recommendation: regular textures, thin liquids  Recommended Precautions and Strategies:  upright posture during/after eating, small bites of food and sips of liquid  SLP Rec. for Method of Medication Administration: meds whole, with thin liquids     Monitor for Signs of Aspiration: yes, notify SLP if any concerns     Swallow Criteria for Skilled Therapeutic Interventions Met: baseline status  Anticipated Dischage Disposition: home     Therapy Frequency (Swallow): evaluation only          Plan of Care Reviewed With: patient  Plan of Care Review  Plan of Care Reviewed With: patient  Progress: no change  Outcome Summary: Bedside Swallow eval completed. No overt s/s of aspiration noted. Recommend pt continue on a regular/thin diet. ST is not indicated at this time. Please reconsult if needed.         SLP Outcome Measures (last 72 hours)      SLP Outcome Measures     Row Name 05/14/19 1600             SLP Outcome Measures    Outcome Measure Used?  Adult NOMS  -AW         Adult FCM Scores    FCM Chosen  Swallowing  -AW      Swallowing FCM Score  7  -        User Key  (r) = Recorded By, (t) = Taken By, (c) = Cosigned By    Initials Name Effective Dates    Glo Dimas MS CCC-SLP 06/08/18 -            Time Calculation:   Time Calculation- SLP     Row Name 05/14/19 1648             Time Calculation- SLP    SLP Start Time  1530  -AW      SLP Received On  05/14/19  -        User Key  (r) = Recorded By, (t) = Taken By, (c) = Cosigned By    Initials Name Provider Type    Glo Dimas MS CCC-SLP Speech and Language Pathologist          Therapy Charges for Today     Code Description Service Date Service Provider Modifiers Qty    [id number] HC ST EVAL ORAL PHARYNG SWALLOW 4 5/14/2019 Glo Malik MS CCC-SLP GN 1               Gol Malik MS CCC-SLP  5/14/2019

## 2019-05-14 NOTE — DISCHARGE INSTRUCTIONS
Aspirin 81 mg daily  Plavix 75 mg daily x1 month then discontinue  Atorvastatin 40 mg daily  Follow-up with DIANNE Hi in the stroke clinic in 3 months; sooner if needed.  Call with any questions.

## 2019-05-14 NOTE — PROGRESS NOTES
Discharge Planning Assessment  [facility]     Patient Name: Cristy Wilde  MRN: [id number]  Today's Date: 5/14/2019    Admit Date: 5/13/2019    Discharge Needs Assessment     Row Name 05/14/19 1539       Living Environment    Lives With  alone    Current Living Arrangements  home/apartment/condo    Primary Care Provided by  self    Provides Primary Care For  no one    Family Caregiver if Needed  child(paulina), adult    Family Caregiver Names  Leonides Wilde son [phone]    Able to Return to Prior Arrangements  yes       Resource/Environmental Concerns    Resource/Environmental Concerns  none    Transportation Concerns  car, none       Transition Planning    Patient/Family Anticipates Transition to  home    Patient/Family Anticipated Services at Transition  none    Transportation Anticipated  family or friend will provide       Discharge Needs Assessment    Readmission Within the Last 30 Days  no previous admission in last 30 days    Concerns to be Addressed  no discharge needs identified    Equipment Currently Used at Home  none    Anticipated Changes Related to Illness  none    Equipment Needed After Discharge  none    Offered/Gave Vendor List  no    Current Discharge Risk  lives alone        Discharge Plan     Row Name 05/14/19 1544       Plan    Plan  Home, family support    Patient/Family in Agreement with Plan  yes    Plan Comments  IMM 5/14/2019. Met with patient at bedside, face sheet verified. Prior to admission patient was living independently in her own home, able to tend to all of her own ADL's. Patient's  is in [facility]. Patient uses [facility] in [facility], denies issues obtaining affording or taking her medications, declined offer of Meds to Beds. Patient states she has POA, Leonides Wilde son [phone], and requested copy for hospital records. Discharge plans are to return home, denies any needs at this time. Family will be able to transport. Will continue to  monitor for new or changing discharge plans.        Destination      No service coordination in this encounter.      Durable Medical Equipment      No service coordination in this encounter.      Dialysis/Infusion      No service coordination in this encounter.      Home Medical Care      No service coordination in this encounter.      Therapy      No service coordination in this encounter.      Community Resources      No service coordination in this encounter.          Demographic Summary     Row Name 05/14/19 1538       General Information    Admission Type  inpatient    Arrived From  emergency department    Required Notices Provided  Important Message from Medicare    Referral Source  admission list    Reason for Consult  discharge planning    Preferred Language  English     Used During This Interaction  no       Contact Information    Permission Granted to Share Info With  family/designee Leonides Wilde son [phone]        Functional Status     Row Name 05/14/19 1539       Functional Status    Usual Activity Tolerance  good    Current Activity Tolerance  good       Functional Status, IADL    Medications  independent    Meal Preparation  independent    Housekeeping  independent    Laundry  independent    Shopping  independent       Mental Status    General Appearance WDL  WDL       Mental Status Summary    Recent Changes in Mental Status/Cognitive Functioning  no changes       Employment/    Employment Status  retired        Psychosocial    No documentation.       Abuse/Neglect    No documentation.       Legal    No documentation.       Substance Abuse    No documentation.       Patient Forms    No documentation.           Dannielle Yu RN

## 2019-05-14 NOTE — NURSING NOTE
Stroke booklet provided to patient. Discussed  personal modifiable risk factors for stroke. Also discussed stroke medications, signs and symptoms of stroke and importance of calling 911/seeking immediate medical attention for any signs/symptoms of stroke. All questions answered.

## 2019-05-15 ENCOUNTER — READMISSION MANAGEMENT (OUTPATIENT)
Dept: CALL CENTER | Facility: HOSPITAL | Age: 84
End: 2019-05-15

## 2019-05-15 NOTE — PROGRESS NOTES
Case Management Discharge Note    Final Note: Discharged home, denies needs.     Destination      No service has been selected for the patient.      Durable Medical Equipment      No service has been selected for the patient.      Dialysis/Infusion      No service has been selected for the patient.      Home Medical Care      No service has been selected for the patient.      Therapy      No service has been selected for the patient.      Community Resources      No service has been selected for the patient.        Transportation Services  Private: Car    Final Discharge Disposition Code: 01 - home or self-care

## 2019-05-15 NOTE — OUTREACH NOTE
Prep Survey      Responses   Facility patient discharged from?  [place]   Is patient eligible?  Yes   Discharge diagnosis  TIA   Does the patient have one of the following disease processes/diagnoses(primary or secondary)?  Stroke (TIA)   Does the patient have Home health ordered?  No   Is there a DME ordered?  No   General alerts for this patient  SonLeonides is  2021   Prep survey completed?  Yes          Estrellita Rand RN

## 2019-05-16 ENCOUNTER — READMISSION MANAGEMENT (OUTPATIENT)
Dept: CALL CENTER | Facility: HOSPITAL | Age: 84
End: 2019-05-16

## 2019-05-16 ENCOUNTER — TELEPHONE (OUTPATIENT)
Dept: INTERNAL MEDICINE | Facility: CLINIC | Age: 84
End: 2019-05-16

## 2019-05-16 NOTE — TELEPHONE ENCOUNTER
Pt is calling and asking if she should be on the lipitor. She did not see or read anything in her medical records about her having high cholesterol.

## 2019-05-16 NOTE — OUTREACH NOTE
Stroke Week 1 Survey      Responses   Facility patient discharged from?  [place]   Does the patient have one of the following disease processes/diagnoses(primary or secondary)?  Stroke (TIA)   Is there a successful TCM telephone encounter documented?  No   Week 1 attempt successful?  Yes   Call start time  0849   Call end time  0854   General alerts for this patient  Please call SonLeonides is  [phone]   Discharge diagnosis  TIA   Person spoke with today (if not patient) and relationship  Leonides-son   Meds reviewed with patient/caregiver?  Yes   Is the patient having any side effects they believe may be caused by any medication additions or changes?  No   Does the patient have all medications ordered at discharge?  Yes   Is the patient taking all medications as directed (includes completed medication regime)?  Yes   Does the patient have a primary care provider?   Yes   Does the patient have an appointment with their PCP within 7 days of discharge?  -- [Son not sure of date, pt independent with all of this]   Has the patient kept scheduled appointments due by today?  N/A   Has home health visited the patient within 72 hours of discharge?  N/A   Psychosocial issues?  No   Psychosocial comments   in nursing home   Does the patient require any assistance with activities of daily living such as eating, bathing, dressing, walking, etc.?  No   Did the patient receive a copy of their discharge instructions?  Yes   Nursing interventions  Reviewed instructions with patient   What is the patient's perception of their health status since discharge?  Improving   Is the patient able to teach back FAST for Stroke?  Yes   Is the patient/caregiver able to teach back signs and symptoms related to disease process for when to call 911?  Yes   Week 1 call completed?  Yes          Christina Montero RN

## 2019-05-16 NOTE — TELEPHONE ENCOUNTER
Yes.  All pts with stroke or TIA need to be on anti-platelet/ anti-coag AND statin for secondary stroke prevention. This is guideline based tx.

## 2019-05-17 LAB
ABO + RH BLD: NORMAL
ABO + RH BLD: NORMAL
BH BB BLOOD EXPIRATION DATE: NORMAL
BH BB BLOOD EXPIRATION DATE: NORMAL
BH BB BLOOD TYPE BARCODE: 9500
BH BB BLOOD TYPE BARCODE: 9500
BH BB DISPENSE STATUS: NORMAL
BH BB DISPENSE STATUS: NORMAL
BH BB PRODUCT CODE: NORMAL
BH BB PRODUCT CODE: NORMAL
BH BB UNIT NUMBER: NORMAL
BH BB UNIT NUMBER: NORMAL
CROSSMATCH INTERPRETATION: NORMAL
CROSSMATCH INTERPRETATION: NORMAL
UNIT  ABO: NORMAL
UNIT  ABO: NORMAL
UNIT  RH: NORMAL
UNIT  RH: NORMAL

## 2019-05-17 RX ORDER — ESCITALOPRAM OXALATE 10 MG/1
TABLET ORAL
Qty: 90 TABLET | Refills: 0 | Status: SHIPPED | OUTPATIENT
Start: 2019-05-17 | End: 2019-09-03 | Stop reason: SDUPTHER

## 2019-05-23 ENCOUNTER — READMISSION MANAGEMENT (OUTPATIENT)
Dept: CALL CENTER | Facility: HOSPITAL | Age: 84
End: 2019-05-23

## 2019-05-23 NOTE — OUTREACH NOTE
Stroke Week 2 Survey      Responses   Facility patient discharged from?  [place]   Does the patient have one of the following disease processes/diagnoses(primary or secondary)?  Stroke (TIA)   Week 2 attempt successful?  Yes   Call start time  1239   Call end time  1241   General alerts for this patient  Please call SonLeonides is  [phone]   Discharge diagnosis  TIA   Is patient permission given to speak with other caregiver?  Yes   List who call center can speak with  POA   Person spoke with today (if not patient) and relationship  Leonides-son   Meds reviewed with patient/caregiver?  Yes   Is the patient having any side effects they believe may be caused by any medication additions or changes?  No   Does the patient have all medications ordered at discharge?  Yes   Is the patient taking all medications as directed (includes completed medication regime)?  Yes   Does the patient have a primary care provider?   Yes   Has the patient kept scheduled appointments due by today?  N/A   Psychosocial issues?  No   Does the patient require any assistance with activities of daily living such as eating, bathing, dressing, walking, etc.?  No   Does the patient have any residual symptoms from stroke/TIA?  No   Does the patient understand the diet ordered at discharge?  Yes   Comments  Pt doing great per son   Did the patient receive a copy of their discharge instructions?  Yes   Nursing interventions  Reviewed instructions with patient   What is the patient's perception of their health status since discharge?  Returned to baseline/stable   Is the patient able to teach back FAST for Stroke?  Yes   Is the patient/caregiver able to teach back the risk factors for a stroke?  Smoking, High Cholesterol, Diabetes, High blood pressure-goal below 120/80   Is the patient/caregiver able to teach back signs and symptoms related to disease process for when to call PCP?  Yes   Is the patient/caregiver able to teach back signs and  symptoms related to disease process for when to call 911?  Yes   Is the patient/caregiver able to teach back the hierarchy of who to call/visit for symptoms/problems? PCP, Specialist, Home health nurse, Urgent Care, ED, 911  Yes   Week 2 call completed?  Yes   Revoked  No further contact(revokes)-requires comment   Graduated/Revoked comments  baseline          Shellie Tovar, RN

## 2019-05-30 ENCOUNTER — OFFICE VISIT (OUTPATIENT)
Dept: INTERNAL MEDICINE | Facility: CLINIC | Age: 84
End: 2019-05-30

## 2019-05-30 VITALS
SYSTOLIC BLOOD PRESSURE: 120 MMHG | HEART RATE: 91 BPM | BODY MASS INDEX: 20.38 KG/M2 | OXYGEN SATURATION: 98 % | HEIGHT: 63 IN | RESPIRATION RATE: 16 BRPM | DIASTOLIC BLOOD PRESSURE: 60 MMHG | TEMPERATURE: 97.7 F | WEIGHT: 115 LBS

## 2019-05-30 DIAGNOSIS — I63.9 NEW INFARCTION OF CEREBELLUM (HCC): Primary | ICD-10-CM

## 2019-05-30 DIAGNOSIS — F43.23 ADJUSTMENT DISORDER WITH MIXED ANXIETY AND DEPRESSED MOOD: ICD-10-CM

## 2019-05-30 DIAGNOSIS — R26.89 LOSS OF BALANCE: ICD-10-CM

## 2019-05-30 DIAGNOSIS — R00.0 TACHYCARDIA: ICD-10-CM

## 2019-05-30 DIAGNOSIS — F41.1 GAD (GENERALIZED ANXIETY DISORDER): ICD-10-CM

## 2019-05-30 DIAGNOSIS — Z79.899 ON STATIN THERAPY: ICD-10-CM

## 2019-05-30 DIAGNOSIS — K59.00 CONSTIPATION, UNSPECIFIED CONSTIPATION TYPE: ICD-10-CM

## 2019-05-30 PROCEDURE — 99214 OFFICE O/P EST MOD 30 MIN: CPT | Performed by: INTERNAL MEDICINE

## 2019-05-30 NOTE — PROGRESS NOTES
"hospital f.u      HPI  Cristy Wilde is a 84 y.o. female RTC in f/u after recent inpt admission:   \"I am doing better. I am still a bit tippy but I am doing better\".  Pt notes that she feels like balance is off and will at times swerve to L when walking. Notes has had issues since was in the hospital earlier this month.    Pt recalls that she had event prior to admit where had whole L side arm and leg were \"not working right\". Called son after about 30 minutes and he took her to the ED.  Really did \"not want that ambulance and the neighbors poking their head out\".  Pt recalls some vertigo issues in past few years and wonders if that is possibly strokes in the past.   Recovered rather rapidly and was released.  PT has never gotten a heart monitor at all.  Has not had any additional follow-up since was in the hospital.  Has felt OK since other than balance issues and thinks \"it affected my eyes\".  \"I googled that last night\".  Pt thinks that \"all this stress she is under\" with her  in NH is at play. Pt recounts, as she has in past, numerous stresses and frustrations in caring for .    Pt more concerned today , seemingly, about the nurse who \"yelled at me\" when in the hospital.     Review of Systems   Constitution: Negative for chills, fever and malaise/fatigue.   Eyes: Positive for blurred vision (feels like cannot 'see as far'.  NO acute loss of vision. ). Negative for discharge, double vision, pain, vision loss in left eye and vision loss in right eye.   Cardiovascular: Negative for chest pain, dyspnea on exertion, irregular heartbeat and palpitations.        Feels like heart beats fast sometimes, but is not irregular   Respiratory: Negative for shortness of breath.    Hematologic/Lymphatic: Negative for bleeding problem. Bruises/bleeds easily (since on blood thinners.  No bleeeding).   Musculoskeletal: Negative for muscle weakness.   Gastrointestinal: Positive for bloating (wtih constipation, " "since on new meds), change in bowel habit and constipation. Negative for abdominal pain, diarrhea, nausea and vomiting.   Neurological: Positive for loss of balance. Negative for dizziness, focal weakness, light-headedness and vertigo.   Psychiatric/Behavioral: The patient is not nervous/anxious.        The following portions of the patient's history were reviewed and updated as appropriate: allergies, current medications, past medical history, past social history and problem list.      Current Outpatient Medications:   •  aspirin 81 MG EC tablet, Take 1 tablet by mouth Daily., Disp: 30 tablet, Rfl: 0  •  atorvastatin (LIPITOR) 40 MG tablet, Take 1 tablet by mouth Every Night., Disp: 30 tablet, Rfl: 0  •  CALCIUM-VITAMIN D PO, Take 1 tablet by mouth 2 (Two) Times a Day. Dose unknown, Disp: , Rfl:   •  clopidogrel (PLAVIX) 75 MG tablet, Take 1 tablet by mouth Daily., Disp: 30 tablet, Rfl: 0  •  cycloSPORINE (RESTASIS) 0.05 % ophthalmic emulsion, Administer 1 drop to both eyes 2 (Two) Times a Day., Disp: , Rfl:   •  denosumab (PROLIA) 60 MG/ML solution syringe, Inject 60 mg under the skin into the appropriate area as directed Every 6 (Six) Months., Disp: , Rfl:   •  docusate sodium (COLACE) 100 MG capsule, Take 100 mg by mouth Every Night., Disp: , Rfl:   •  escitalopram (LEXAPRO) 10 MG tablet, TAKE ONE TABLET BY MOUTH DAILY, Disp: 90 tablet, Rfl: 0  •  Magnesium 250 MG tablet, Take 1 tablet by mouth Every Night., Disp: , Rfl:     Vitals:    05/30/19 1050   BP: 120/60   Pulse: 91   Resp: 16   Temp: 97.7 °F (36.5 °C)   SpO2: 98%   Weight: 52.2 kg (115 lb)   Height: 160 cm (63\")         Physical Exam   Constitutional: She is oriented to person, place, and time. She appears well-developed and well-nourished. No distress.   HENT:   Head: Normocephalic and atraumatic.   Mouth/Throat: Oropharynx is clear and moist. No oropharyngeal exudate.   Eyes: Pupils are equal, round, and reactive to light. No scleral icterus.   Neck: " Normal range of motion. Neck supple.   Cardiovascular: Normal rate, regular rhythm and normal heart sounds.   Pulmonary/Chest: Effort normal and breath sounds normal. No respiratory distress. She has no wheezes. She has no rales.   Abdominal: Soft. Bowel sounds are normal. She exhibits no distension and no mass. There is no tenderness. There is no guarding.   Musculoskeletal: She exhibits no edema.   Lymphadenopathy:     She has no cervical adenopathy.   Neurological: She is alert and oriented to person, place, and time. She displays no tremor. No cranial nerve deficit. She exhibits normal muscle tone. Gait normal.   Skin: Bruising (on forearms and R medial ankle, resolving) noted.   Psychiatric: She has a normal mood and affect. Her behavior is normal.   Vitals reviewed.      Assessment/ Plan  Diagnoses and all orders for this visit:    New infarction of cerebellum (CMS/HCC)  -     Holter Monitor - 72 Hour Up To 21 Days; Future    Loss of balance  -     Ambulatory Referral to Physical Therapy Evaluate and treat  -     Holter Monitor - 72 Hour Up To 21 Days; Future    JESUS (generalized anxiety disorder)    Adjustment disorder with mixed anxiety and depressed mood  -     Comprehensive Metabolic Panel  -     CBC & Differential    Tachycardia   Comments:  Intermittent  Orders:  -     Holter Monitor - 72 Hour Up To 21 Days; Future  -     Comprehensive Metabolic Panel  -     CBC & Differential    On statin therapy  -     Comprehensive Metabolic Panel    Constipation, unspecified constipation type        Return in about 3 months (around 8/30/2019) for Annual physical.      Discussion:  Cristy Wilde is a 84 y.o. female RTC in hospital follow-up after recent in pt admission 5/2019 for acute cerebellar stroke (Small acute infarct of the upper left cerebellum on MRI).  Pt has negative inpt work-up and was released on statin and ASA and clopidogrel.  Pt is doing well with meds and no bleeding noted.  No side effects noted.  PT is, however, really worried about her loss of balance that she feels is worse since stroke event. However, pt also notes that her constipation, neck muscle pain, entire L side of body, and vision are all worse since stroke and this assessment is out proportion to findings on MRI.  I offered pt some reassurance and advised her to start with PT as, at baseline, her age can lead to some balance issues.  Optho appt upcoming.  I reviewed entire inpt work-up and pt does need heart monitor placed to complete w/u for etiology of CVA. Order for Zio patch placed. Pt aware to remain clopidogrel for one full month and then step down to ASA 81mg daily and c/w statin. Check CBC and CMP today.  Will confirm she has neurology appt. I advised increase in stool softener for constipation with add-on of Miralax if needed by pt declines Miralax add on at this time.    RTC 3 months CPE, F labs prior

## 2019-05-31 ENCOUNTER — TELEPHONE (OUTPATIENT)
Dept: NEUROLOGY | Facility: CLINIC | Age: 84
End: 2019-05-31

## 2019-05-31 ENCOUNTER — TELEPHONE (OUTPATIENT)
Dept: INTERNAL MEDICINE | Facility: CLINIC | Age: 84
End: 2019-05-31

## 2019-05-31 LAB
ALBUMIN SERPL-MCNC: 3.9 G/DL (ref 3.5–5.2)
ALBUMIN/GLOB SERPL: 1.4 G/DL
ALP SERPL-CCNC: 70 U/L (ref 39–117)
ALT SERPL-CCNC: 21 U/L (ref 1–33)
AST SERPL-CCNC: 17 U/L (ref 1–32)
BASOPHILS # BLD AUTO: 0.04 10*3/MM3 (ref 0–0.2)
BASOPHILS NFR BLD AUTO: 0.7 % (ref 0–1.5)
BILIRUB SERPL-MCNC: 0.4 MG/DL (ref 0.2–1.2)
BUN SERPL-MCNC: 21 MG/DL (ref 8–23)
BUN/CREAT SERPL: 26.9 (ref 7–25)
CALCIUM SERPL-MCNC: 10.6 MG/DL (ref 8.6–10.5)
CHLORIDE SERPL-SCNC: 103 MMOL/L (ref 98–107)
CO2 SERPL-SCNC: 27.5 MMOL/L (ref 22–29)
CREAT SERPL-MCNC: 0.78 MG/DL (ref 0.57–1)
EOSINOPHIL # BLD AUTO: 0.28 10*3/MM3 (ref 0–0.4)
EOSINOPHIL NFR BLD AUTO: 5.1 % (ref 0.3–6.2)
ERYTHROCYTE [DISTWIDTH] IN BLOOD BY AUTOMATED COUNT: 14.3 % (ref 12.3–15.4)
GLOBULIN SER CALC-MCNC: 2.8 GM/DL
GLUCOSE SERPL-MCNC: 157 MG/DL (ref 65–99)
HCT VFR BLD AUTO: 43.8 % (ref 34–46.6)
HGB BLD-MCNC: 13.8 G/DL (ref 12–15.9)
IMM GRANULOCYTES # BLD AUTO: 0.02 10*3/MM3 (ref 0–0.05)
IMM GRANULOCYTES NFR BLD AUTO: 0.4 % (ref 0–0.5)
LYMPHOCYTES # BLD AUTO: 0.89 10*3/MM3 (ref 0.7–3.1)
LYMPHOCYTES NFR BLD AUTO: 16.1 % (ref 19.6–45.3)
MCH RBC QN AUTO: 29.9 PG (ref 26.6–33)
MCHC RBC AUTO-ENTMCNC: 31.5 G/DL (ref 31.5–35.7)
MCV RBC AUTO: 95 FL (ref 79–97)
MONOCYTES # BLD AUTO: 0.55 10*3/MM3 (ref 0.1–0.9)
MONOCYTES NFR BLD AUTO: 9.9 % (ref 5–12)
NEUTROPHILS # BLD AUTO: 3.75 10*3/MM3 (ref 1.7–7)
NEUTROPHILS NFR BLD AUTO: 67.8 % (ref 42.7–76)
NRBC BLD AUTO-RTO: 0 /100 WBC (ref 0–0.2)
PLATELET # BLD AUTO: 209 10*3/MM3 (ref 140–450)
POTASSIUM SERPL-SCNC: 4.3 MMOL/L (ref 3.5–5.2)
PROT SERPL-MCNC: 6.7 G/DL (ref 6–8.5)
RBC # BLD AUTO: 4.61 10*6/MM3 (ref 3.77–5.28)
SODIUM SERPL-SCNC: 142 MMOL/L (ref 136–145)
WBC # BLD AUTO: 5.53 10*3/MM3 (ref 3.4–10.8)

## 2019-05-31 NOTE — TELEPHONE ENCOUNTER
Two Week Stroke Phone Call  Spoke with the patient  · Admission Date: 5/13/2019  · Discharge Date: 5/14/2019  · Discharge Destination: Home  · Meds reviewed with patient/caregiver?    [x]Yes [] No   o Antiplatelet: Aspirin, Plavix  - Understands purpose     [x]  Yes     []  No     - Understands how to take      [x]  Yes     []  No     Reminded to take aspirin and plavix x1 month then Aspirin only   o Cholesterol Reducing: Lipitor  - Understands purpose     [x]  Yes     []  No    - Understands how to take      [x]  Yes     []  No   · Is the patient taking all medication as directed?   [x]  Yes  []  No  · Discussed personal risk factors   [x]  Yes []  No    o High blood pressure   - Bp goal <130/80   o High cholesterol   - Review desired LDL goal <70  • Discussed signs and symptoms of stroke and when patient to call 911?   [x]  Yes []  No  o Sudden weakness or numbness of the face, arm, or leg especially on one side of the body  o Sudden confusion, trouble speaking or understanding  o Sudden trouble seeing in one or both eyes   o Sudden trouble walking, dizziness, loss of balance or coordination  o Sudden severe headaches with no known cause    Notified Patient that if any of these symptoms occur to call 911  · Does the patient have any new signs or symptoms of a stroke?   []  Yes     [x]  No  · Does the patient have an appointment with PCP?  [x]  Yes     []  No  · Does the patient have 3 month Stroke Clinic appointment?  [x]  Yes     []  No  · Is the patient currently in therapy, outpatient, or home health?  []  Yes     [x]  No    Needs a referral?      []  Yes     [x]  No   Does the patient have increasing stiffness in your arms, hands, or legs?    []  Yes     [x]  No   Is this interfering with activities of daily living?   []  Yes     [x]  No  Patient Satisfaction   · How would you rate your satisfaction with the instructions provided about your specific risk factors for stroke?   []Poor  [] Fair    [x] Good []  Very Good  [] Excellent   · How would you rate your satisfaction with the instructions provided on the warnings signs and symptoms of stroke?   []Poor  [] Fair   [x] Good [] Very Good  [] Excellent   · How well did we explain the importance of calling 911 to activate the emergency medical system for new signs and symptoms of stroke?    []Poor  [] Fair   [] Good [] Very Good  [] Excellent   · Would you recommend the stroke center to your friends and family?   []Definitely Would Not  [] Probably Would Not  [x] Neutral   []  Probably Would [] Definitely Would

## 2019-06-19 ENCOUNTER — TELEPHONE (OUTPATIENT)
Dept: INTERNAL MEDICINE | Facility: CLINIC | Age: 84
End: 2019-06-19

## 2019-06-20 RX ORDER — ATORVASTATIN CALCIUM 40 MG/1
40 TABLET, FILM COATED ORAL NIGHTLY
Qty: 30 TABLET | Refills: 2 | Status: SHIPPED | OUTPATIENT
Start: 2019-06-20 | End: 2019-07-22 | Stop reason: SDUPTHER

## 2019-07-22 RX ORDER — ATORVASTATIN CALCIUM 40 MG/1
40 TABLET, FILM COATED ORAL NIGHTLY
Qty: 30 TABLET | Refills: 2 | Status: SHIPPED | OUTPATIENT
Start: 2019-07-22 | End: 2019-11-20 | Stop reason: DRUGHIGH

## 2019-07-24 ENCOUNTER — TELEPHONE (OUTPATIENT)
Dept: INTERNAL MEDICINE | Facility: CLINIC | Age: 84
End: 2019-07-24

## 2019-07-24 NOTE — TELEPHONE ENCOUNTER
Pt is wanting to know if she is to continue with her Lipitor and Asprin daily.  She has a refill at the pharmacy but is not sure if she has to pick it up.

## 2019-08-05 ENCOUNTER — TELEPHONE (OUTPATIENT)
Dept: INTERNAL MEDICINE | Facility: CLINIC | Age: 84
End: 2019-08-05

## 2019-08-05 NOTE — TELEPHONE ENCOUNTER
Pt is wanting to know if it is ok for her to take Algae adore+ and strontium boost. Have you heard anything about these?

## 2019-08-05 NOTE — TELEPHONE ENCOUNTER
I have not read about these specifically. For what indication does she want to take these supplements?

## 2019-08-08 NOTE — TELEPHONE ENCOUNTER
Pt is wanting to take this for her bones. She said that it states that the calcium she is taking now is made from stone and we don't absorb the  calcium we are taking now. She stated that if the dr has not heard about these then she is not going to pay attention to them or take them.

## 2019-08-15 ENCOUNTER — OFFICE VISIT (OUTPATIENT)
Dept: NEUROLOGY | Facility: CLINIC | Age: 84
End: 2019-08-15

## 2019-08-15 VITALS
BODY MASS INDEX: 19.67 KG/M2 | OXYGEN SATURATION: 97 % | HEIGHT: 63 IN | DIASTOLIC BLOOD PRESSURE: 78 MMHG | HEART RATE: 77 BPM | SYSTOLIC BLOOD PRESSURE: 130 MMHG | WEIGHT: 111 LBS

## 2019-08-15 DIAGNOSIS — E53.8 B12 DEFICIENCY: ICD-10-CM

## 2019-08-15 DIAGNOSIS — R42 VERTIGO: ICD-10-CM

## 2019-08-15 DIAGNOSIS — I63.9 CEREBROVASCULAR ACCIDENT (CVA), UNSPECIFIED MECHANISM (HCC): Primary | ICD-10-CM

## 2019-08-15 PROCEDURE — 99213 OFFICE O/P EST LOW 20 MIN: CPT | Performed by: NURSE PRACTITIONER

## 2019-08-15 RX ORDER — LANOLIN ALCOHOL/MO/W.PET/CERES
1000 CREAM (GRAM) TOPICAL DAILY
Qty: 30 TABLET | Refills: 5 | Status: SHIPPED | OUTPATIENT
Start: 2019-08-15

## 2019-08-15 NOTE — PROGRESS NOTES
DOS: 8/15/2019  NAME: Cristy Wilde   : 1934  PCP: Jerardo Kaur MD    Chief Complaint   Patient presents with   • Stroke      She is unaccompanied.    Neurological Problem and Interval History:  84 y.o. left-handed female with a Hx of Diverticulitis and osteoarthritis; no other significant medical history noted I am seen today in follow-up for small acute infarct in the upper left cerebellum.     Ms. Weber presented to [facility] on May 13, 2019 due to sudden development of left arm and leg weakness/clumsiness with associated dysarthria.  NIH score was 0 therefore anti-thrombolytics were contraindicated.  CT of the head was negative for acute infarction although CTA was questionable area of perfusion abnormality noted in the left cerebellar hemisphere.  No significant stenosis noted.  Also questionable area of fibromuscular dysplasia involving the segment of the left vertebral.  MRI of the brain later confirmed small acute infarction in the upper left cerebellum without mass-effect and/or hemorrhage.  Etiology of this event thought to be secondary to cardioembolic source although TTE unrevealing.  TTE showed ejection fraction 54%.  LV/LA normal.  No mention of PFO.  EKG normal sinus rhythm.  Patient since had long-term Holter which was benign; no evidence of atrial fibrillation although there was several episodes of ectopy noted.  Discharged home on dual oral antiplatelets; on no antiplatelets prior to arrival and high-dose statin.  She has since discontinued Plavix after 30 days per our recommendations and remains on low-dose aspirin and high-dose statin.  B12 level 376; will recommend B12 replacement.  Lipid panel  showed LDL 71.  AST 17/ALT 21.  Will continue atorvastatin as written x3 more months then decrease to 20 mg daily.  A1c 6.10.     Discharge she denies any new signs and/or symptoms of stroke although she still does have persistent gait/imbalance issues.  She  continues to participate in physical therapy x2 days/week.  She denies any falls since discharge.  Prior to CVA she used no assistive devices she currently uses cane to assist with gait stability.  She reports systolic blood pressures consistently less than 130s; typically 120s.  She denies any concern for depression and/or anxiety/PBA.  PHQ 9 (5), PBA screening tool 7 and stopping screening tool 1/8.  She does report increased stress with lawsuit that she is currently working on for her husbands benefits.  Her  is non-verbal/immobile and is in a nursing facility which increases her stress due to her obligations to him.She has no other complaints and/or concerns on my exam.        Review of Systems:        Review of Systems   Constitutional: Positive for fatigue. Negative for activity change, appetite change and unexpected weight change.   HENT: Positive for hearing loss, rhinorrhea and tinnitus. Negative for facial swelling, trouble swallowing and voice change.    Eyes: Negative for photophobia, pain and visual disturbance.   Respiratory: Negative for chest tightness, shortness of breath and wheezing.    Cardiovascular: Negative for chest pain, palpitations and leg swelling.   Gastrointestinal: Positive for constipation. Negative for abdominal pain, nausea and vomiting.   Endocrine: Negative for polydipsia and polyphagia.   Genitourinary: Positive for enuresis.   Musculoskeletal: Positive for gait problem. Negative for arthralgias, back pain, joint swelling, myalgias, neck pain and neck stiffness.   Neurological: Positive for weakness (left side). Negative for dizziness, tremors, seizures, syncope, facial asymmetry, speech difficulty, light-headedness, numbness and headaches.   Hematological: Bruises/bleeds easily.   Psychiatric/Behavioral: Negative for agitation, behavioral problems, confusion, decreased concentration, dysphoric mood, hallucinations, self-injury, sleep disturbance and suicidal ideas. The  "patient is nervous/anxious. The patient is not hyperactive.          Current Outpatient Medications:   •  aspirin 81 MG EC tablet, Take 1 tablet by mouth Daily., Disp: 30 tablet, Rfl: 0  •  atorvastatin (LIPITOR) 40 MG tablet, Take 1 tablet by mouth Every Night., Disp: 30 tablet, Rfl: 2  •  CALCIUM-VITAMIN D PO, Take 1 tablet by mouth 2 (Two) Times a Day. Dose unknown, Disp: , Rfl:   •  cycloSPORINE (RESTASIS) 0.05 % ophthalmic emulsion, Administer 1 drop to both eyes 2 (Two) Times a Day., Disp: , Rfl:   •  denosumab (PROLIA) 60 MG/ML solution syringe, Inject 60 mg under the skin into the appropriate area as directed Every 6 (Six) Months., Disp: , Rfl:   •  docusate sodium (COLACE) 100 MG capsule, Take 100 mg by mouth Every Night., Disp: , Rfl:   •  escitalopram (LEXAPRO) 10 MG tablet, TAKE ONE TABLET BY MOUTH DAILY, Disp: 90 tablet, Rfl: 0  •  Magnesium 250 MG tablet, Take 1 tablet by mouth Every Night., Disp: , Rfl:   •  clopidogrel (PLAVIX) 75 MG tablet, Take 1 tablet by mouth Daily., Disp: 30 tablet, Rfl: 0  •  vitamin B-12 (CYANOCOBALAMIN) 1000 MCG tablet, Take 1 tablet by mouth Daily., Disp: 30 tablet, Rfl: 5    \"The following portions of the patient's history were reviewed and updated as appropriate: allergies, current medications, past family history, past medical history, past social history, past surgical history and problem list.\"  Review and Interpretation of Imaging:  Reviewed imaging/work-up from May 2019 admission  Laboratory Results:             Lab Results   Component Value Date    HGBA1C 6.10 (H) 05/14/2019         Lab Results   Component Value Date    CHOL 143 05/14/2019         Lab Results   Component Value Date    HDL 60 05/14/2019    HDL 73 (H) 03/29/2018    HDL 80 (H) 03/03/2016         Lab Results   Component Value Date    LDL 71 05/14/2019     (H) 03/29/2018    LDL 96 03/03/2016         Lab Results   Component Value Date    TRIG 62 05/14/2019    TRIG 62 03/29/2018    TRIG 55 " 03/03/2016     No results found for: RPR  Lab Results   Component Value Date    TSH 1.970 03/29/2019     Lab Results   Component Value Date    VCATHRJT90 367 05/13/2019       Physical Examination: NIHSS: 1 mRS: 1  General Appearance:   Well developed, well nourished, well groomed, alert, and cooperative.  HEENT: Normocephalic.   Neck and Spine: Normal range of motion.  Normal alignment. No mass or tenderness. No bruits.  Cardiac: Regular rate and rhythm. No murmurs.  Peripheral Vasculature: Radial and pedal pulses are equal and symmetric.  Extremities:    No edema or deformities. Normal joint ROM.  Skin:    No rashes or birth marks.    Neurological examination:  Higher Integrative  Function: Oriented to time, place and person. Normal registration, recall, attention span and concentration. Normal language including comprehension, spontaneous speech, repetition, reading, writing, naming and vocabulary. No neglect with normal visual-spatial function and construction. Normal fund of knowledge and higher integrative function.  CN II: Pupils are equal, round, and reactive to light. Normal visual acuity and visual fields.    CN III IV VI: Extraocular movements are full without nystagmus.   CN V: Normal facial sensation and strength of muscles of mastication.  CN VII: Facial movements are symmetric. No weakness.  CN VIII:   Auditory acuity is normal.  CN IX & X:   Symmetric palatal movement.  CN XI: Sternocleidomastoid and trapezius are normal.  No weakness.  CN XII:   The tongue is midline.  No atrophy or fasciculations.  Motor: Normal muscle strength, bulk and tone in upper and lower extremities.  No fasciculations, rigidity, spasticity, or abnormal movements.  Reflexes: Plantar responses are flexor.  Sensation: Normal to light touch, pinprick, vibration, temperature, and proprioception in arms and legs. Normal graphesthesia and no extinction on DSS.  Station and Gait: Unsteady gait and station. Use cane for  stabilization.   Coordination: Finger to nose test shows no dysmetria.  Rapid alternating movements are normal.  Heel to shin normal.    Diagnoses / Discussion: Is a 4-year-old female with history of diverticulitis and osteoarthritis; no other significant medical history who I saw today in follow-up for acute left cerebellar infarction.  Ms. Weber presented to [facility] my 13th due to sudden development of left-sided arm and leg weakness/lumpiness with associated dysarthria.  Her NIH on arrival was 0 therefore she was not a candidate for thrombolytic therapy.  Initial CT of the head was negative for acute findings.  Initial MRI of the brain confirmed small acute infarct within the upper left cerebellum without mass-effect and/or hemorrhage.  Etiology of this event was thought to be secondary to cardioembolic source.  TTE showed EF 54%.  LV/LA normal.  No evidence of PFO.  No cardiac source noted on TTE.  EKG normal sinus.  Long-term Holter essentially unremarkable minus some intermittent ectopy.  Etiology of the CVA still unknown.  She was placed on dual oral antiplatelets x30 days; Plavix since discontinued.  She is currently taking low-dose aspirin and high-dose statin tolerating without any difficulty.  She denies any new signs and/or symptoms of stroke.  Although she does have some issues with gait and balance and continues to participate in therapy 2 days/week.  She denies any other complaints and/or concerns on my exam.  She agrees to recommendations below.  Follow-up in 3 months; sooner if needed.    Plan:    B12 replacement; B12 367   Continue aspirin and statin as written   Repeat lipid panel/AST/ALT prior to next appointment   Continue out patient therapy for gait instability   Blood pressure control to <130/80   Goal LDL <70-recommend high dose statins-    Serum glucose < 140   Call 911 for stroke any stroke symptoms   Follow-up 6 months  Cristy was seen today for stroke.    Diagnoses  and all orders for this visit:    Cerebrovascular accident (CVA), unspecified mechanism (CMS/HCC)    B12 deficiency  -     vitamin B-12 (CYANOCOBALAMIN) 1000 MCG tablet; Take 1 tablet by mouth Daily.    Vertigo

## 2019-08-28 DIAGNOSIS — I63.9 CEREBROVASCULAR ACCIDENT (CVA), UNSPECIFIED MECHANISM (HCC): ICD-10-CM

## 2019-08-28 DIAGNOSIS — Z00.00 HEALTHCARE MAINTENANCE: Primary | ICD-10-CM

## 2019-08-28 DIAGNOSIS — M80.00XA AGE-RELATED OSTEOPOROSIS WITH CURRENT PATHOLOGICAL FRACTURE, INITIAL ENCOUNTER: ICD-10-CM

## 2019-08-28 DIAGNOSIS — R73.01 IFG (IMPAIRED FASTING GLUCOSE): ICD-10-CM

## 2019-09-03 RX ORDER — ESCITALOPRAM OXALATE 10 MG/1
TABLET ORAL
Qty: 90 TABLET | Refills: 0 | Status: SHIPPED | OUTPATIENT
Start: 2019-09-03 | End: 2019-12-30

## 2019-09-06 LAB
ALBUMIN SERPL-MCNC: 4.4 G/DL (ref 3.5–5.2)
ALBUMIN/GLOB SERPL: 1.9 G/DL
ALP SERPL-CCNC: 77 U/L (ref 39–117)
ALT SERPL-CCNC: 18 U/L (ref 1–33)
APPEARANCE UR: CLEAR
AST SERPL-CCNC: 19 U/L (ref 1–32)
BACTERIA #/AREA URNS HPF: NORMAL /HPF
BACTERIA UR CULT: ABNORMAL
BACTERIA UR CULT: ABNORMAL
BASOPHILS # BLD AUTO: 0.08 10*3/MM3 (ref 0–0.2)
BASOPHILS NFR BLD AUTO: 1 % (ref 0–1.5)
BILIRUB SERPL-MCNC: 0.5 MG/DL (ref 0.2–1.2)
BILIRUB UR QL STRIP: NEGATIVE
BUN SERPL-MCNC: 21 MG/DL (ref 8–23)
BUN/CREAT SERPL: 25.9 (ref 7–25)
CALCIUM SERPL-MCNC: 10 MG/DL (ref 8.6–10.5)
CHLORIDE SERPL-SCNC: 101 MMOL/L (ref 98–107)
CHOLEST SERPL-MCNC: 161 MG/DL (ref 0–200)
CO2 SERPL-SCNC: 26.7 MMOL/L (ref 22–29)
COLOR UR: YELLOW
CREAT SERPL-MCNC: 0.81 MG/DL (ref 0.57–1)
EOSINOPHIL # BLD AUTO: 0.29 10*3/MM3 (ref 0–0.4)
EOSINOPHIL NFR BLD AUTO: 3.8 % (ref 0.3–6.2)
EPI CELLS #/AREA URNS HPF: NORMAL /HPF
ERYTHROCYTE [DISTWIDTH] IN BLOOD BY AUTOMATED COUNT: 13.8 % (ref 12.3–15.4)
GLOBULIN SER CALC-MCNC: 2.3 GM/DL
GLUCOSE SERPL-MCNC: 156 MG/DL (ref 65–99)
GLUCOSE UR QL: NEGATIVE
HBA1C MFR BLD: 7.2 % (ref 4.8–5.6)
HCT VFR BLD AUTO: 45.7 % (ref 34–46.6)
HDLC SERPL-MCNC: 72 MG/DL (ref 40–60)
HGB BLD-MCNC: 14.5 G/DL (ref 12–15.9)
HGB UR QL STRIP: NEGATIVE
IMM GRANULOCYTES # BLD AUTO: 0.03 10*3/MM3 (ref 0–0.05)
IMM GRANULOCYTES NFR BLD AUTO: 0.4 % (ref 0–0.5)
KETONES UR QL STRIP: NEGATIVE
LDLC SERPL CALC-MCNC: 77 MG/DL (ref 0–100)
LEUKOCYTE ESTERASE UR QL STRIP: ABNORMAL
LYMPHOCYTES # BLD AUTO: 1.05 10*3/MM3 (ref 0.7–3.1)
LYMPHOCYTES NFR BLD AUTO: 13.8 % (ref 19.6–45.3)
MCH RBC QN AUTO: 29.8 PG (ref 26.6–33)
MCHC RBC AUTO-ENTMCNC: 31.7 G/DL (ref 31.5–35.7)
MCV RBC AUTO: 93.8 FL (ref 79–97)
MICRO URNS: ABNORMAL
MONOCYTES # BLD AUTO: 0.7 10*3/MM3 (ref 0.1–0.9)
MONOCYTES NFR BLD AUTO: 9.2 % (ref 5–12)
NEUTROPHILS # BLD AUTO: 5.48 10*3/MM3 (ref 1.7–7)
NEUTROPHILS NFR BLD AUTO: 71.8 % (ref 42.7–76)
NITRITE UR QL STRIP: NEGATIVE
NRBC BLD AUTO-RTO: 0 /100 WBC (ref 0–0.2)
OTHER ANTIBIOTIC SUSC ISLT: ABNORMAL
PH UR STRIP: 6.5 [PH] (ref 5–7.5)
PLATELET # BLD AUTO: 224 10*3/MM3 (ref 140–450)
POTASSIUM SERPL-SCNC: 4.2 MMOL/L (ref 3.5–5.2)
PROT SERPL-MCNC: 6.7 G/DL (ref 6–8.5)
PROT UR QL STRIP: NEGATIVE
RBC # BLD AUTO: 4.87 10*6/MM3 (ref 3.77–5.28)
RBC #/AREA URNS HPF: NORMAL /HPF
SODIUM SERPL-SCNC: 140 MMOL/L (ref 136–145)
SP GR UR: 1.01 (ref 1–1.03)
TRIGL SERPL-MCNC: 58 MG/DL (ref 0–150)
URINALYSIS REFLEX: ABNORMAL
UROBILINOGEN UR STRIP-MCNC: 0.2 MG/DL (ref 0.2–1)
VLDLC SERPL CALC-MCNC: 11.6 MG/DL
WBC # BLD AUTO: 7.63 10*3/MM3 (ref 3.4–10.8)
WBC #/AREA URNS HPF: NORMAL /HPF

## 2019-09-09 ENCOUNTER — OFFICE VISIT (OUTPATIENT)
Dept: INTERNAL MEDICINE | Facility: CLINIC | Age: 84
End: 2019-09-09

## 2019-09-09 ENCOUNTER — TELEPHONE (OUTPATIENT)
Dept: INTERNAL MEDICINE | Facility: CLINIC | Age: 84
End: 2019-09-09

## 2019-09-09 VITALS
BODY MASS INDEX: 20.2 KG/M2 | HEIGHT: 63 IN | RESPIRATION RATE: 12 BRPM | DIASTOLIC BLOOD PRESSURE: 70 MMHG | TEMPERATURE: 97.9 F | SYSTOLIC BLOOD PRESSURE: 120 MMHG | OXYGEN SATURATION: 98 % | WEIGHT: 114 LBS | HEART RATE: 94 BPM

## 2019-09-09 DIAGNOSIS — M81.0 OSTEOPOROSIS, UNSPECIFIED OSTEOPOROSIS TYPE, UNSPECIFIED PATHOLOGICAL FRACTURE PRESENCE: ICD-10-CM

## 2019-09-09 DIAGNOSIS — I63.9 CEREBROVASCULAR ACCIDENT (CVA), UNSPECIFIED MECHANISM (HCC): ICD-10-CM

## 2019-09-09 DIAGNOSIS — F41.1 GAD (GENERALIZED ANXIETY DISORDER): ICD-10-CM

## 2019-09-09 DIAGNOSIS — H91.93 BILATERAL HEARING LOSS, UNSPECIFIED HEARING LOSS TYPE: ICD-10-CM

## 2019-09-09 DIAGNOSIS — L85.3 XEROSIS OF SKIN: ICD-10-CM

## 2019-09-09 DIAGNOSIS — H04.123 DRY EYES: ICD-10-CM

## 2019-09-09 DIAGNOSIS — J31.0 CHRONIC RHINITIS: ICD-10-CM

## 2019-09-09 DIAGNOSIS — Z23 ENCOUNTER FOR IMMUNIZATION: ICD-10-CM

## 2019-09-09 DIAGNOSIS — R82.90 ABNORMAL URINALYSIS: Primary | ICD-10-CM

## 2019-09-09 DIAGNOSIS — Z00.00 HEALTHCARE MAINTENANCE: ICD-10-CM

## 2019-09-09 DIAGNOSIS — B35.1 ONYCHOMYCOSIS: ICD-10-CM

## 2019-09-09 DIAGNOSIS — F43.23 ADJUSTMENT DISORDER WITH MIXED ANXIETY AND DEPRESSED MOOD: ICD-10-CM

## 2019-09-09 DIAGNOSIS — E11.9 CONTROLLED TYPE 2 DIABETES MELLITUS WITHOUT COMPLICATION, WITHOUT LONG-TERM CURRENT USE OF INSULIN (HCC): ICD-10-CM

## 2019-09-09 DIAGNOSIS — N39.3 STRESS INCONTINENCE IN FEMALE: ICD-10-CM

## 2019-09-09 PROBLEM — R42 VERTIGO: Status: RESOLVED | Noted: 2018-09-11 | Resolved: 2019-09-09

## 2019-09-09 PROCEDURE — 99214 OFFICE O/P EST MOD 30 MIN: CPT | Performed by: INTERNAL MEDICINE

## 2019-09-09 PROCEDURE — G0439 PPPS, SUBSEQ VISIT: HCPCS | Performed by: INTERNAL MEDICINE

## 2019-09-09 NOTE — PROGRESS NOTES
"Subjective   Cristy Wilde is a 84 y.o. female     Chief Complaint   Patient presents with   • Annual Exam     review of medical issues        HPI:  Cristy Wilde is a 84 y.o. female RTC in yearly AWV, review of medical issues: \"Well, I dont know.  I am showing some progress since my stroke\".  Is going to PT twice weekly now. Like her PT and thinks is helping. Saw neuro and told 'it was due to the stress I am under with my '.  Pt is aware that there was no other source.  On ASA daily, off Plavix.  'I got a lot of purple spots all over me from that'.   I ask pt if she thinks she needs to see someone about this.  Admits, \"I dont know\".  Is agreeable to consider evaluation.    1. Admission 5/2019 for acute cerebellar stroke (Small acute infarct of the upper left cerebellum on MRI) - no recurrence of sx.  Feeling like tolerating ASA OK, happy to bee off Plavix.  Saw neuro in f/u already this year.   2. Anxiety with element of adjustment d/o -  on Lexapro and sleeping well.  \"Well, I am coping'.  Feels like has good family stress.  Having some car trouble today and is stressed about that. \"It has been tough\".  \"Now what is the Lexapro for? I sleep well.\".  Is surprised that she sleeps so well with med and that it is not sleeping pill. Admits is overwhelmed by everything. Recounts at length the stressors she is dealing with. Often will not answer questions during interview without drifting toward talking about , / dementia, CNA's at NH, busy schedule or other concerns.   3. Onychomycossis - now using Vicks Rub on toenails nightly and noted overall is not better. Stopped doing that. \"It was not doing anything\". Does not want meds for it.  4. Hearing Loss - now has hearing aides that help.  Some itching as side effect of use in ear canals, but nothing noted on exam today. C/W topical oil use for prevention of itching.   5. Osteoporosis - is s/p 9 Prolia shots and tolerating. Asks about " "strontium boost Ca++ today.  Saw stuff on internet about regular calcium that it is \"all stone\".   6. Dry Eye Syndrome - on Restasis per optho.  Uses them daily. Saw optho last one week ago.   7. 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  \"I dont exercise because I dont have time\".   Is in PT 2x/ week now only.  Cooking at home some but often will eat at [employer] with salad or grilled chicken wrap.   8. Venous stasis - notes ankles will swell form time to time. Right now L ankle seems a bit more than R and is noted today by pt.  No pain.     The following portions of the patient's history were reviewed and updated as appropriate: allergies, current medications, past family history, past medical history, past social history, past surgical history and problem list.    Review of Systems   Constitution: Negative for chills, fever, malaise/fatigue, weight gain and weight loss.        \"Why did I get a call to get a cancer screen saliva test? Is this just a scam?\"     HENT: Positive for hearing loss (has aides, does not like them. \"They bother me.  A few months ago a huge hunk of waxy material came out of there on the left. Why did they miss that. I saved it and Im going to show it to my duaghter\"> ). Negative for congestion, hoarse voice, odynophagia and sore throat.    Eyes: Positive for blurred vision (when using computer). Negative for discharge, double vision, pain and redness.        Sees optho upcoming     Cardiovascular: Negative for chest pain, dyspnea on exertion, irregular heartbeat, leg swelling, near-syncope, palpitations and syncope.   Respiratory: Negative for cough and shortness of breath.    Endocrine: Negative for polydipsia, polyphagia and polyuria.   Hematologic/Lymphatic: Negative for bleeding problem. Does not bruise/bleed easily (improved off PLavix. ).   Skin: Positive for dry skin (long hx, diffuse) and suspicious lesions (lump on R arm, small, no pain, no skin changes overlying.  " "Is not getting larger. ). Negative for color change.   Musculoskeletal: Positive for muscle cramps (variable in legs, worse at PT. ), muscle weakness and neck pain (with kyphosis. ). Negative for back pain, joint pain, joint swelling and myalgias.        Notes kyphosis is issue and pt does not like it.  Pt is using OTC product and thinks it is helpful. Is not going to use a brace.  \"It reminds you to keep your shoulders back\".  Wants \"something written to turn into my taxes\".      Gastrointestinal: Negative for constipation, diarrhea, dysphagia, heartburn, nausea and vomiting.   Genitourinary: Negative for dysuria, frequency, hematuria and hesitancy.        Has some odor to urine.  No other UTI sx.    Neurological: Positive for focal weakness (B legs, since stroke.). Negative for dizziness, headaches and light-headedness.   Psychiatric/Behavioral: Negative for altered mental status and depression. The patient is nervous/anxious. The patient does not have insomnia.    Allergic/Immunologic: Negative for environmental allergies and persistent infections.       Patient Care Team:  Jerardo Kaur MD as PCP - General  Jerardo Kaur MD as PCP - Family Medicine  Jerardo Kaur MD as PCP - [facility]  Albino Childs MD as Consulting Physician (Ophthalmology)  MAE Davis MD as Consulting Physician (Ophthalmology)    Recent Hospitalizations: Recently treated at the following:  [facility]    Depression Screen:   PHQ-2/PHQ-9 Depression Screening 9/9/2019   Little interest or pleasure in doing things 0   Feeling down, depressed, or hopeless 0   Trouble falling or staying asleep, or sleeping too much 0   Feeling tired or having little energy 0   Poor appetite or overeating 0   Feeling bad about yourself - or that you are a failure or have let yourself or your family down 0   Trouble concentrating on things, such as reading the newspaper or watching television 0   Moving or speaking so slowly " that other people could have noticed. Or the opposite - being so fidgety or restless that you have been moving around a lot more than usual 0   Thoughts that you would be better off dead, or of hurting yourself in some way 0   Total Score 0   If you checked off any problems, how difficult have these problems made it for you to do your work, take care of things at home, or get along with other people? Not difficult at all       Functional and Cognitive Screening:  Functional & Cognitive Status 9/9/2019   Do you have difficulty preparing food and eating? No   Do you have difficulty bathing yourself, getting dressed or grooming yourself? No   Do you have difficulty using the toilet? No   Do you have difficulty moving around from place to place? No   Do you have trouble with steps or getting out of a bed or a chair? No   Current Diet Limited Junk Food   Dental Exam Up to date   Eye Exam Up to date   Exercise (times per week) 0 times per week   Current Exercise Activities Include None   Do you need help using the phone?  No   Are you deaf or do you have serious difficulty hearing?  No   Do you need help with transportation? No   Do you need help shopping? No   Do you need help preparing meals?  No   Do you need help with housework?  No   Do you need help with laundry? No   Do you need help taking your medications? No   Do you need help managing money? No   Do you ever drive or ride in a car without wearing a seat belt? No   Have you felt unusual stress, anger or loneliness in the last month? No   Who do you live with? Alone   If you need help, do you have trouble finding someone available to you? No   Have you been bothered in the last four weeks by sexual problems? No   Do you have difficulty concentrating, remembering or making decisions? No       Does the patient have evidence of cognitive impairment? no    Taking ASA appropriately as indicated    Vitals:    09/09/19 1111   BP: 120/70   Pulse: 94   Resp: 12   Temp:  "97.9 °F (36.6 °C)   SpO2: 98%   Weight: 51.7 kg (114 lb)   Height: 160 cm (63\")   PainSc: 0-No pain       Body mass index is 20.19 kg/m².    Visual Acuity:  No exam data present    Advanced Care Planning:  Patient has an advance directive - a copy has not been provided. Have asked the patient to send this to us to add to record    Objective   Physical Exam   Constitutional: She is oriented to person, place, and time. She appears well-developed and well-nourished. No distress.   HENT:   Head: Normocephalic and atraumatic.   Right Ear: Hearing, tympanic membrane, external ear and ear canal normal.   Left Ear: Hearing, tympanic membrane, external ear and ear canal normal.   Nose: Nose normal.   Mouth/Throat: Uvula is midline, oropharynx is clear and moist and mucous membranes are normal.   Scant wax on R only     Eyes: Conjunctivae, EOM and lids are normal. Pupils are equal, round, and reactive to light. Right eye exhibits no discharge. Left eye exhibits no discharge. No scleral icterus.   Neck: Trachea normal, normal range of motion and full passive range of motion without pain. Neck supple. Carotid bruit is not present. No thyroid mass and no thyromegaly present.   Cardiovascular: Normal rate, regular rhythm, S1 normal, S2 normal, normal heart sounds and intact distal pulses. Exam reveals no gallop and no friction rub.   No murmur heard.  Pulses:       Radial pulses are 2+ on the right side, and 2+ on the left side.        Dorsalis pedis pulses are 2+ on the right side, and 2+ on the left side.        Posterior tibial pulses are 2+ on the right side, and 2+ on the left side.   Scattered varicosities on BLE.    Pulmonary/Chest: Effort normal and breath sounds normal. No respiratory distress. She has no wheezes. She has no rhonchi. She has no rales. She exhibits no mass. Right breast exhibits no inverted nipple, no mass, no nipple discharge and no skin change. Left breast exhibits no inverted nipple, no mass, no nipple " discharge and no skin change.   Chaperone present     Abdominal: Soft. Normal appearance and bowel sounds are normal. She exhibits no distension and no mass. There is no hepatosplenomegaly. There is no tenderness. There is no rebound and no guarding.   Musculoskeletal: Normal range of motion. She exhibits edema (sockline only B ankles).     Vascular Status -  Her right foot exhibits normal foot vasculature  and no edema. Her left foot exhibits normal foot vasculature  and no edema.  Lymphadenopathy:     She has no cervical adenopathy.     She has no axillary adenopathy.        Right: No inguinal adenopathy present.        Left: No inguinal adenopathy present.   Neurological: She is alert and oriented to person, place, and time. She has normal strength and normal reflexes. She displays no tremor. No cranial nerve deficit or sensory deficit. She exhibits normal muscle tone. Gait normal.   Skin: Skin is warm and dry. No rash noted.        Psychiatric: Thought content normal. Her mood appears anxious. Her affect is not angry, not blunt, not labile and not inappropriate. Her speech is not rapid and/or pressured, not delayed, not tangential and not slurred. She is agitated. She is not aggressive, not hyperactive, not slowed, not withdrawn and not combative. Cognition and memory are normal. She does not exhibit a depressed mood. She is communicative.   Vitals reviewed.      Assessment/Plan   Problems Addressed this Visit     Chronic rhinitis    Dry eyes    Hearing loss    Osteoporosis    Adjustment disorder with mixed anxiety and depressed mood    Relevant Orders    Ambulatory Referral to Psychology    Onychomycosis    Controlled type 2 diabetes mellitus without complication, without long-term current use of insulin (CMS/[facility])    Relevant Medications    metFORMIN (GLUCOPHAGE) 500 MG tablet    Other Relevant Orders    Microalbumin / Creatinine Urine Ratio - Urine, Clean Catch    Ambulatory Referral to Nutrition Services     JESUS (generalized anxiety disorder)    Relevant Orders    Ambulatory Referral to Psychology    Stress incontinence in female    Xerosis of skin    Stroke (CMS/[facility])      Other Visit Diagnoses     Abnormal urinalysis    -  Primary    Relevant Orders    UA / M With / Rflx Culture([facility] ONLY) - Urine, Clean Catch    UA / M With / Rflx Culture([facility] ONLY) - Urine, Clean Catch    Healthcare maintenance        Encounter for immunization                   Diagnoses and all orders for this visit:    Abnormal urinalysis  -     UA / M With / Rflx Culture([facility] ONLY) - Urine, Clean Catch  -     UA / M With / Rflx Culture([facility] ONLY) - Urine, Clean Catch    Cerebrovascular accident (CVA), unspecified mechanism (CMS/[facility])    Chronic rhinitis    Controlled type 2 diabetes mellitus without complication, without long-term current use of insulin (CMS/[facility])  -     Microalbumin / Creatinine Urine Ratio - Urine, Clean Catch  -     Ambulatory Referral to Nutrition Services  -     metFORMIN (GLUCOPHAGE) 500 MG tablet; Take 1 tablet by mouth 2 (Two) Times a Day With Meals.    Bilateral hearing loss, unspecified hearing loss type    Onychomycosis    Osteoporosis, unspecified osteoporosis type, unspecified pathological fracture presence    Xerosis of skin    Stress incontinence in female    JESUS (generalized anxiety disorder)  -     Ambulatory Referral to Psychology    Adjustment disorder with mixed anxiety and depressed mood  -     Ambulatory Referral to Psychology    Dry eyes    Healthcare maintenance  -     Cancel: Fluzone High Dose =>65Years    Encounter for immunization   -     Cancel: Fluzone High Dose =>65Years        Cristy Wilde is a 84 y.o. female RTC in yearly AWV, review of medical issues: Still dealing with a great deal of stress with ill  in NH and daily life stressors.   1. Admission 5/2019 for acute cerebellar stroke (Small acute infarct of the upper left cerebellum on MRI).  Pt has negative inpt work-up and  was released on statin and ASA and clopidogrel, now on ASA only per neuro. Reviwed 8/2019 note and etiology of stroke is not clear after work-up, pt attributes to stress.  C/W PT for now as she is. ON statin and ASA daily. ? Need to add CoQ10 with muscle cramps issue.    2. Anxiety with element of adjustment d/o - issue is progressive over time and today simply dominates visit. Pt cannot simply answer question about herself without outright perseveration on stresses she is dealing with, , car trouble, ill , inadequate care at NH.  I d/w pt  Need for talk therapy as I am not sure that any level of medication will resolve these issues and pt declines additional meds at this time.  C/W Lexapro. Referral list for psychology given and reviewed with pt. Needs to add back exercise, but pt simply cannot grasp doing this now as spo overwhelmed.  Sleep is good.     3. Chronic rhinitis and globus sensation - s/p ENT eval in past showing no secondary etiology on nasal scope. Rhinitis persists, secondary issue for pt today.   4. Onychomycossis - off Vicks Rub on toenails, declines additional meds.   5. Hearing Loss - hearing aides, intolerant.  I reassured pt today on wax issue that it was normal and she was clear.   6. Osteoporosis - is s/p 9 Prolia shots and on Ca++D daily.  Repeat DEXA due 6/2020.  Add back exercise.   7. Dry Eye Syndrome - on Restasis per optho.   8. IFG with strong FHx of DM II --> new dx DMII  Today - pt has progressed. I think largely due to lack of exercise and poor diet (lots of fast food).  I d/w pt need for home cooked meals (and as part of stress relief) and restart of exercise. Will air on more aggressive side given stroke hx and start metformin 500mg BID, d/w pt side effects.  Optho appt upcoming. Check Umicroalb/ Cr today. Trend A1C in 3 months.   9. Stress Incontinence - Add back Kegel's. Recheck U/A today as results equivocal for UTI.  10. Venous stasis - progressive with mild  dermatitis in legs and some ankle swelling. Declined compression socks in past.   11. HM - labs d/w pt: Flu - at pharmacy; Td/ Pvax/ Prevnar/ Zostavax - UTD; Check at Rx on Tdap and Shingrix; Mammo UTD 5/2019; Pap D/C'd, declined bimanual today after counseling; C-scope 2011 (-) --> due with hx polyps, pt declines for now; DEXA as above; c/w exercise; Preventative care plan provided to pt at end of visit    Return in about 3 months (around 12/9/2019) for Recheck.          Current Outpatient Medications:   •  aspirin 81 MG EC tablet, Take 1 tablet by mouth Daily., Disp: 30 tablet, Rfl: 0  •  atorvastatin (LIPITOR) 40 MG tablet, Take 1 tablet by mouth Every Night., Disp: 30 tablet, Rfl: 2  •  CALCIUM-VITAMIN D PO, Take 1 tablet by mouth 2 (Two) Times a Day. Dose unknown, Disp: , Rfl:   •  cycloSPORINE (RESTASIS) 0.05 % ophthalmic emulsion, Administer 1 drop to both eyes 2 (Two) Times a Day., Disp: , Rfl:   •  denosumab (PROLIA) 60 MG/ML solution syringe, Inject 60 mg under the skin into the appropriate area as directed Every 6 (Six) Months., Disp: , Rfl:   •  docusate sodium (COLACE) 100 MG capsule, Take 100 mg by mouth Every Night., Disp: , Rfl:   •  escitalopram (LEXAPRO) 10 MG tablet, TAKE ONE TABLET BY MOUTH DAILY, Disp: 90 tablet, Rfl: 0  •  Magnesium 250 MG tablet, Take 1 tablet by mouth Every Night., Disp: , Rfl:   •  vitamin B-12 (CYANOCOBALAMIN) 1000 MCG tablet, Take 1 tablet by mouth Daily., Disp: 30 tablet, Rfl: 5  •  metFORMIN (GLUCOPHAGE) 500 MG tablet, Take 1 tablet by mouth 2 (Two) Times a Day With Meals., Disp: 60 tablet, Rfl: 5

## 2019-09-09 NOTE — PATIENT INSTRUCTIONS
Medicare Wellness  Personal Prevention Plan of Service     Date of Office Visit:  2019  Encounter Provider:  Jerardo Kaur MD  Place of Service:  [facility] INTERNAL MEDICINE  Patient Name: Cristy Wilde  :  1934    As part of the Medicare Wellness portion of your visit today, we are providing you with this personalized preventive plan of services (PPPS). This plan is based upon recommendations of the [place] Preventive Services Task Force (USPSTF) and the Advisory Committee on Immunization Practices (ACIP).    This lists the preventive care services that should be considered, and provides dates of when you are due. Items listed as completed are up-to-date and do not require any further intervention.    Health Maintenance   Topic Date Due   • TDAP/TD VACCINES (1 - Tdap) 2008   • ZOSTER VACCINE (2 of 3) 2010   • INFLUENZA VACCINE  2019   • PNEUMOCOCCAL VACCINES (65+ LOW/MEDIUM RISK) (2 of 2 - PPSV23) 2025 (Originally 11/15/2016)   • HEMOGLOBIN A1C  2020   • DXA SCAN  2020   • MEDICARE ANNUAL WELLNESS  2020   • URINE MICROALBUMIN  2020   • MAMMOGRAM  2021       Orders Placed This Encounter   Procedures   • UA / M With / Rflx Culture([facility] ONLY) - Urine, Clean Catch   • Microalbumin / Creatinine Urine Ratio - Urine, Clean Catch   • UA / M With / Rflx Culture([facility] ONLY) - Urine, Clean Catch   • Ambulatory Referral to Psychology     Referral Priority:   Routine     Referral Type:   Behavorial Health/Psych     Referral Reason:   Specialty Services Required     Requested Specialty:   Psychology     Number of Visits Requested:   1   • Ambulatory Referral to Nutrition Services     Referral Priority:   Routine     Referral Type:   Health Education     Referral Reason:   Specialty Services Required     Number of Visits Requested:   1       Return in about 3 months (around 2019) for Recheck.

## 2019-09-10 DIAGNOSIS — E11.9 CONTROLLED TYPE 2 DIABETES MELLITUS WITHOUT COMPLICATION, WITHOUT LONG-TERM CURRENT USE OF INSULIN (HCC): Primary | ICD-10-CM

## 2019-09-10 DIAGNOSIS — M79.89 PAIN AND SWELLING OF LEFT LOWER LEG: Primary | ICD-10-CM

## 2019-09-10 DIAGNOSIS — M79.662 PAIN AND SWELLING OF LEFT LOWER LEG: Primary | ICD-10-CM

## 2019-09-10 LAB
ALBUMIN/CREAT UR: <8.1 MG/G CREAT (ref 0–30)
APPEARANCE UR: CLEAR
BACTERIA #/AREA URNS HPF: NORMAL /HPF
BILIRUB UR QL STRIP: NEGATIVE
COLOR UR: YELLOW
CREAT UR-MCNC: 37.2 MG/DL
EPI CELLS #/AREA URNS HPF: NORMAL /HPF
GLUCOSE UR QL: ABNORMAL
HGB UR QL STRIP: NEGATIVE
KETONES UR QL STRIP: NEGATIVE
LEUKOCYTE ESTERASE UR QL STRIP: NEGATIVE
MICRO URNS: ABNORMAL
MICRO URNS: ABNORMAL
MICROALBUMIN UR-MCNC: <3 UG/ML
MUCOUS THREADS URNS QL MICRO: PRESENT /HPF
NITRITE UR QL STRIP: NEGATIVE
PH UR STRIP: 6 [PH] (ref 5–7.5)
PROT UR QL STRIP: NEGATIVE
RBC #/AREA URNS HPF: NORMAL /HPF
SP GR UR: 1.01 (ref 1–1.03)
URINALYSIS REFLEX: ABNORMAL
UROBILINOGEN UR STRIP-MCNC: 0.2 MG/DL (ref 0.2–1)
WBC #/AREA URNS HPF: NORMAL /HPF

## 2019-09-12 ENCOUNTER — INFUSION (OUTPATIENT)
Dept: ONCOLOGY | Facility: HOSPITAL | Age: 84
End: 2019-09-12

## 2019-09-12 VITALS
BODY MASS INDEX: 19.91 KG/M2 | WEIGHT: 112.4 LBS | OXYGEN SATURATION: 96 % | TEMPERATURE: 97.9 F | HEART RATE: 81 BPM | SYSTOLIC BLOOD PRESSURE: 118 MMHG | DIASTOLIC BLOOD PRESSURE: 55 MMHG

## 2019-09-12 DIAGNOSIS — M80.00XA AGE-RELATED OSTEOPOROSIS WITH CURRENT PATHOLOGICAL FRACTURE, INITIAL ENCOUNTER: Primary | ICD-10-CM

## 2019-09-12 PROCEDURE — 96372 THER/PROPH/DIAG INJ SC/IM: CPT | Performed by: NURSE PRACTITIONER

## 2019-09-12 PROCEDURE — 25010000002 DENOSUMAB 60 MG/ML SOLUTION PREFILLED SYRINGE: Performed by: NURSE PRACTITIONER

## 2019-09-12 RX ADMIN — DENOSUMAB 60 MG: 60 INJECTION SUBCUTANEOUS at 14:13

## 2019-09-16 ENCOUNTER — TELEPHONE (OUTPATIENT)
Dept: INTERNAL MEDICINE | Facility: CLINIC | Age: 84
End: 2019-09-16

## 2019-09-16 NOTE — TELEPHONE ENCOUNTER
Well, metformin is a BID drug.  She can take once daily but will not get consistent sugar control.  There is a once daily formulation but is expensive.  I advise BID dosing but cannot force pt to take her medications.

## 2019-09-16 NOTE — TELEPHONE ENCOUNTER
Pt is wanting to know if she can take the metformin 500mg once daily at night. Her daughter only wants her to take one tablet daily instead of 2

## 2019-10-03 ENCOUNTER — TELEPHONE (OUTPATIENT)
Dept: INTERNAL MEDICINE | Facility: CLINIC | Age: 84
End: 2019-10-03

## 2019-10-03 NOTE — TELEPHONE ENCOUNTER
Pt would like added to the letter for the posture medic. That she suffered from a stroke 5-13-19, her neck and overall posture, balance and legs were affected due to the stroke. Pt has been going to rehab twice a week since the stroke and pt feel like the brace is helping with the posture and with neck control.

## 2019-11-14 ENCOUNTER — APPOINTMENT (OUTPATIENT)
Dept: DIABETES SERVICES | Facility: HOSPITAL | Age: 84
End: 2019-11-14

## 2019-11-20 ENCOUNTER — TELEPHONE (OUTPATIENT)
Dept: NEUROLOGY | Facility: CLINIC | Age: 84
End: 2019-11-20

## 2019-11-20 ENCOUNTER — OFFICE VISIT (OUTPATIENT)
Dept: NEUROLOGY | Facility: CLINIC | Age: 84
End: 2019-11-20

## 2019-11-20 VITALS
SYSTOLIC BLOOD PRESSURE: 110 MMHG | OXYGEN SATURATION: 94 % | HEIGHT: 63 IN | BODY MASS INDEX: 20.02 KG/M2 | HEART RATE: 71 BPM | WEIGHT: 113 LBS | DIASTOLIC BLOOD PRESSURE: 50 MMHG

## 2019-11-20 DIAGNOSIS — E11.9 TYPE 2 DIABETES MELLITUS WITHOUT COMPLICATION, WITHOUT LONG-TERM CURRENT USE OF INSULIN (HCC): ICD-10-CM

## 2019-11-20 DIAGNOSIS — I63.9 CEREBROVASCULAR ACCIDENT (CVA), UNSPECIFIED MECHANISM (HCC): Primary | ICD-10-CM

## 2019-11-20 DIAGNOSIS — Z79.899 ON STATIN THERAPY: ICD-10-CM

## 2019-11-20 DIAGNOSIS — E53.8 B12 DEFICIENCY: ICD-10-CM

## 2019-11-20 DIAGNOSIS — Z99.89 USE OF CANE AS AMBULATORY AID: ICD-10-CM

## 2019-11-20 PROCEDURE — 99214 OFFICE O/P EST MOD 30 MIN: CPT | Performed by: NURSE PRACTITIONER

## 2019-11-20 RX ORDER — ATORVASTATIN CALCIUM 20 MG/1
20 TABLET, FILM COATED ORAL DAILY
Qty: 30 TABLET | Refills: 11 | Status: SHIPPED | OUTPATIENT
Start: 2019-11-20 | End: 2021-02-15

## 2019-11-20 NOTE — PROGRESS NOTES
DOS: 2019  NAME: Cristy Wilde   : 1934  PCP: Jerardo Kaur MD    Chief Complaint   Patient presents with   • Stroke      Referring MD: No ref. provider found    Neurological Problem and Interval History:  85 y.o. 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      Ms. Weber was last seen in follow-up by me on August 15, 2019 for the same.  At that time she denied any new signs and/or symptoms of stroke although she reported some persistent gait and imbalance issues which today she reports has improved.  At that time she was receiving physical therapy 2 days/week.  He has since been discharged from physical therapy.  She denies any new signs or symptoms of stroke since last evaluation.  She sustained one fall; fell off the stool nursing home with her  (noninjury) since last evaluation.  She reports systolic blood pressures consistently less than 130; today 110/50.  She denies any issues with mood specifically no depression and or PBA.  She continues to take Lexapro 10 mg daily tolerating without any difficulty.  She continues to report poor quality of sleep.  She declines concern for CHUCK.  She has had some unresolved issues to her  which At night.  She states that she is on her third  and has made some headway therefore her spirits are up quite a bit on exam today.  She continues to take aspirin 81 mg, Lipitor 40 mg and B12 replacement.  Her daughter is a physical therapist and she is concerned about some neck weakness as patient reports difficulty holding her head up straight at times after a long day.  I have offered physical therapy which she is declined at this time.  She is requesting soft neck collar and weights for her legs.  She plans to speak with physical therapist who treated her previously to see if  they feel these are indicated.  I have told her I will speak to her daughter (with her permission) and order therapies accordingly.  She reports a 2-day history of heartburn approximately 1 week ago that resolved without any other issues.  She denies chest pain with this episode.  PCP make them aware.  She denies any other complaints under concerns on my exam.      Review of Systems:        Review of Systems   Constitutional: Negative for activity change, appetite change and unexpected weight change.   HENT: Negative for facial swelling, trouble swallowing and voice change.    Eyes: Positive for visual disturbance. Negative for photophobia and pain.   Respiratory: Negative for chest tightness, shortness of breath and wheezing.    Cardiovascular: Positive for leg swelling (LLE). Negative for chest pain and palpitations.   Gastrointestinal: Negative for abdominal pain, nausea and vomiting.   Endocrine: Negative for cold intolerance and heat intolerance.   Musculoskeletal: Positive for gait problem and neck pain. Negative for arthralgias, back pain, joint swelling, myalgias and neck stiffness.   Neurological: Negative for dizziness, tremors, seizures, syncope, facial asymmetry, speech difficulty, weakness, light-headedness, numbness and headaches.   Hematological: Does not bruise/bleed easily.   Psychiatric/Behavioral: Positive for confusion and decreased concentration. Negative for agitation, behavioral problems, dysphoric mood, hallucinations, self-injury, sleep disturbance and suicidal ideas. The patient is nervous/anxious. The patient is not hyperactive.          Current Outpatient Medications:   •  aspirin 81 MG EC tablet, Take 1 tablet by mouth Daily., Disp: 30 tablet, Rfl: 0  •  CALCIUM-VITAMIN D PO, Take 1 tablet by mouth 2 (Two) Times a Day. Dose unknown, Disp: , Rfl:   •  cycloSPORINE (RESTASIS) 0.05 % ophthalmic emulsion, Administer 1 drop to both eyes 2 (Two) Times a Day., Disp: , Rfl:   •  denosumab  "(PROLIA) 60 MG/ML solution syringe, Inject 60 mg under the skin into the appropriate area as directed Every 6 (Six) Months., Disp: , Rfl:   •  docusate sodium (COLACE) 100 MG capsule, Take 100 mg by mouth Every Night., Disp: , Rfl:   •  escitalopram (LEXAPRO) 10 MG tablet, TAKE ONE TABLET BY MOUTH DAILY, Disp: 90 tablet, Rfl: 0  •  vitamin B-12 (CYANOCOBALAMIN) 1000 MCG tablet, Take 1 tablet by mouth Daily., Disp: 30 tablet, Rfl: 5  •  atorvastatin (LIPITOR) 20 MG tablet, Take 1 tablet by mouth Daily., Disp: 30 tablet, Rfl: 11  •  Magnesium 250 MG tablet, Take 1 tablet by mouth Every Night., Disp: , Rfl:     \"The following portions of the patient's history were reviewed and updated as appropriate: allergies, current medications, past family history, past medical history, past social history, past surgical history and problem list.\"  Review and Interpretation of Imaging:  No new imaging reviewed today  Laboratory Results:             Lab Results   Component Value Date    HGBA1C 7.20 (H) 09/03/2019         Lab Results   Component Value Date    CHOL 143 05/14/2019         Lab Results   Component Value Date    HDL 72 (H) 09/03/2019    HDL 60 05/14/2019    HDL 73 (H) 03/29/2018         Lab Results   Component Value Date    LDL 77 09/03/2019    LDL 71 05/14/2019     (H) 03/29/2018         Lab Results   Component Value Date    TRIG 58 09/03/2019    TRIG 62 05/14/2019    TRIG 62 03/29/2018     No results found for: RPR  Lab Results   Component Value Date    TSH 1.970 03/29/2019     Lab Results   Component Value Date    QLXEKGPT51 367 05/13/2019     Physical Examination:   mRS: 1; uses a cane to assist with mobility  General Appearance:           Well developed, well nourished, well groomed, alert, and cooperative.  HEENT:            Normocephalic.   Neck and Spine:         Normal range of motion.  Normal alignment. No mass or tenderness. No bruits.  Cardiac:                                 Regular rate and rhythm. No " murmurs.  Peripheral Vasculature:       Radial and pedal pulses are equal and symmetric.  Extremities:                           No edema or deformities. Normal joint ROM.  Skin:                                       No rashes or birth marks.     Neurological examination:  Higher Integrative  Function:         Oriented to time, place and person. Normal registration, recall, attention span and concentration. Normal language including comprehension, spontaneous speech, repetition, reading, writing, naming and vocabulary. No neglect with normal visual-spatial function and construction. Normal fund of knowledge and higher integrative function.  CN II:    Pupils are equal, round, and reactive to light. Normal visual acuity and visual fields.    CN III IV VI:      Extraocular movements are full without nystagmus.   CN V:   Normal facial sensation and strength of muscles of mastication.  CN VII:             Facial movements are symmetric. No weakness.  CN VIII:                                   Auditory acuity is normal.  CN IX & X:                              Symmetric palatal movement.  CN XI:  Sternocleidomastoid and trapezius are normal.  No weakness.  CN XII:                                    The tongue is midline.  No atrophy or fasciculations.  Motor:  Normal muscle strength, bulk and tone in upper and lower extremities.  No fasciculations, rigidity, spasticity, or abnormal movements.  Reflexes:         Plantar responses are flexor.  Sensation:       Normal to light touch in arms and legs.   Station and Gait:        Unsteady gait and station. Use cane for stabilization.   Coordination:             Finger to nose test shows no dysmetria.  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   Neurologically she is back to her baseline.  She has mild unsteady gait and requires cane to ambulate otherwise back to neurologic baseline.  She denies any new signs and/or symptoms of stroke.  She is compliant with recommended secondary stroke prevention medication recommendations.  Recommendations below.  Call with any questions.  Keep planned follow-up.    Plan:   Continue Lexapro, B12, aspirin and statin as written; decrease atorvastatin to 20mg daily (PCP to follow lipid panel and AST/ALT)    Recommend IRENE hose for left lower extremity    Will consider additional PT for neck weakness in the future; patient's daughter is a physical therapist in [facility] she will call if she feels it is warranted.   Keep planned diabetic class appointment (A1c 7.20) recommend compliance with treatment recommendations.  According to patient metformin not initiated yet   Blood pressure control to <130/80   Goal LDL <70-recommend high dose statins-    Serum glucose < 140   Call 911 for stroke any stroke symptoms   Follow-up May 2020  Cristy was seen today for stroke.    Diagnoses and all orders for this visit:    Cerebrovascular accident (CVA), unspecified mechanism (CMS/HCC)  -     atorvastatin (LIPITOR) 20 MG tablet; Take 1 tablet by mouth Daily.    B12 deficiency    Type 2 diabetes mellitus without complication, without long-term current use of insulin (CMS/HCC)    On statin therapy  -     atorvastatin (LIPITOR) 20 MG tablet; Take 1 tablet by mouth Daily.    Use of cane as ambulatory aid

## 2019-11-20 NOTE — TELEPHONE ENCOUNTER
Spoke w/ pt.    ----- Message from DIANNE Francis sent at 11/20/2019 12:59 PM EST -----  Would you care to call patient to let her know that I failed to tell her to decrease her atorvastatin in 1/2 . She will need to take 20mg daily.

## 2019-11-25 ENCOUNTER — HOSPITAL ENCOUNTER (OUTPATIENT)
Dept: DIABETES SERVICES | Facility: HOSPITAL | Age: 84
Discharge: HOME | End: 2019-11-25
Admitting: INTERNAL MEDICINE

## 2019-11-25 PROCEDURE — 97802 MEDICAL NUTRITION INDIV IN: CPT | Performed by: DIETITIAN, REGISTERED

## 2019-12-05 DIAGNOSIS — F43.23 ADJUSTMENT DISORDER WITH MIXED ANXIETY AND DEPRESSED MOOD: ICD-10-CM

## 2019-12-05 DIAGNOSIS — M80.00XA AGE-RELATED OSTEOPOROSIS WITH CURRENT PATHOLOGICAL FRACTURE, INITIAL ENCOUNTER: ICD-10-CM

## 2019-12-05 DIAGNOSIS — E11.9 TYPE 2 DIABETES MELLITUS WITHOUT COMPLICATION, WITHOUT LONG-TERM CURRENT USE OF INSULIN (HCC): ICD-10-CM

## 2019-12-05 DIAGNOSIS — E11.9 CONTROLLED TYPE 2 DIABETES MELLITUS WITHOUT COMPLICATION, WITHOUT LONG-TERM CURRENT USE OF INSULIN (HCC): Primary | ICD-10-CM

## 2019-12-10 LAB
ALBUMIN SERPL-MCNC: 4.3 G/DL (ref 3.5–5.2)
ALBUMIN/GLOB SERPL: 1.7 G/DL
ALP SERPL-CCNC: 93 U/L (ref 39–117)
ALT SERPL-CCNC: 28 U/L (ref 1–33)
APPEARANCE UR: CLEAR
AST SERPL-CCNC: 26 U/L (ref 1–32)
BACTERIA #/AREA URNS HPF: NORMAL /HPF
BILIRUB SERPL-MCNC: 0.7 MG/DL (ref 0.2–1.2)
BILIRUB UR QL STRIP: NEGATIVE
BUN SERPL-MCNC: 22 MG/DL (ref 8–23)
BUN/CREAT SERPL: 24.7 (ref 7–25)
CALCIUM SERPL-MCNC: 9.9 MG/DL (ref 8.6–10.5)
CHLORIDE SERPL-SCNC: 100 MMOL/L (ref 98–107)
CHOLEST SERPL-MCNC: 141 MG/DL (ref 0–200)
CO2 SERPL-SCNC: 29.7 MMOL/L (ref 22–29)
COLOR UR: YELLOW
CREAT SERPL-MCNC: 0.89 MG/DL (ref 0.57–1)
EPI CELLS #/AREA URNS HPF: NORMAL /HPF (ref 0–10)
GLOBULIN SER CALC-MCNC: 2.5 GM/DL
GLUCOSE SERPL-MCNC: 138 MG/DL (ref 65–99)
GLUCOSE UR QL: NEGATIVE
HBA1C MFR BLD: 7.4 % (ref 4.8–5.6)
HDLC SERPL-MCNC: 70 MG/DL (ref 40–60)
HGB UR QL STRIP: NEGATIVE
KETONES UR QL STRIP: NEGATIVE
LDLC SERPL CALC-MCNC: 57 MG/DL (ref 0–100)
LEUKOCYTE ESTERASE UR QL STRIP: NEGATIVE
MICRO URNS: NORMAL
MICRO URNS: NORMAL
MUCOUS THREADS URNS QL MICRO: PRESENT /HPF
NITRITE UR QL STRIP: NEGATIVE
PH UR STRIP: 7 [PH] (ref 5–7.5)
POTASSIUM SERPL-SCNC: 4.2 MMOL/L (ref 3.5–5.2)
PROT SERPL-MCNC: 6.8 G/DL (ref 6–8.5)
PROT UR QL STRIP: NEGATIVE
RBC #/AREA URNS HPF: NORMAL /HPF (ref 0–2)
SODIUM SERPL-SCNC: 140 MMOL/L (ref 136–145)
SP GR UR: 1.01 (ref 1–1.03)
TRIGL SERPL-MCNC: 70 MG/DL (ref 0–150)
URINALYSIS REFLEX: NORMAL
UROBILINOGEN UR STRIP-MCNC: 0.2 MG/DL (ref 0.2–1)
VLDLC SERPL CALC-MCNC: 14 MG/DL
WBC #/AREA URNS HPF: NORMAL /HPF (ref 0–5)

## 2019-12-12 ENCOUNTER — OFFICE VISIT (OUTPATIENT)
Dept: INTERNAL MEDICINE | Facility: CLINIC | Age: 84
End: 2019-12-12

## 2019-12-12 VITALS
SYSTOLIC BLOOD PRESSURE: 122 MMHG | HEIGHT: 63 IN | DIASTOLIC BLOOD PRESSURE: 60 MMHG | WEIGHT: 113 LBS | HEART RATE: 74 BPM | BODY MASS INDEX: 20.02 KG/M2 | TEMPERATURE: 97.8 F | OXYGEN SATURATION: 99 %

## 2019-12-12 DIAGNOSIS — E11.9 CONTROLLED TYPE 2 DIABETES MELLITUS WITHOUT COMPLICATION, WITHOUT LONG-TERM CURRENT USE OF INSULIN (HCC): Primary | ICD-10-CM

## 2019-12-12 DIAGNOSIS — R29.898 LEG WEAKNESS, BILATERAL: ICD-10-CM

## 2019-12-12 DIAGNOSIS — I63.9 CEREBROVASCULAR ACCIDENT (CVA), UNSPECIFIED MECHANISM (HCC): ICD-10-CM

## 2019-12-12 DIAGNOSIS — M54.50 CHRONIC MIDLINE LOW BACK PAIN WITHOUT SCIATICA: ICD-10-CM

## 2019-12-12 DIAGNOSIS — G89.29 CHRONIC MIDLINE LOW BACK PAIN WITHOUT SCIATICA: ICD-10-CM

## 2019-12-12 DIAGNOSIS — J31.0 CHRONIC RHINITIS: ICD-10-CM

## 2019-12-12 DIAGNOSIS — M53.82 NECK MUSCLE WEAKNESS: ICD-10-CM

## 2019-12-12 DIAGNOSIS — H04.123 DRY EYES: ICD-10-CM

## 2019-12-12 PROCEDURE — 99214 OFFICE O/P EST MOD 30 MIN: CPT | Performed by: INTERNAL MEDICINE

## 2019-12-12 RX ORDER — METFORMIN HYDROCHLORIDE 500 MG/1
500 TABLET, EXTENDED RELEASE ORAL
COMMUNITY
End: 2020-07-20 | Stop reason: SDUPTHER

## 2019-12-12 NOTE — PROGRESS NOTES
"Diabetes and Labs Only      HPI  Cristy Wilde is a 85 y.o. female RTC f/u:   \"I need another order for another round of PT\".  Daughter came home for her birthday and said \"mom, they did not do anything for strengthening of my neck\".  \"She even got down on the floor to show me one\".  Pt wants to go back.  Will take referral with her today.  Pt notes that \"I cant hold my head up at the end of the day\".  Recalls when came out of hospital that weakness was \"my neck and my legs\".  'I am not very happy'.    1. Admission 5/2019 for acute cerebellar stroke (Small acute infarct of the upper left cerebellum on MRI).  Pt has negative inpt work-up and was released on statin and ASA and clopidogrel, now on ASA only per neuro - is on lipitor.  \"I am not having any problems with any of the meds\".    2. Anxiety with element of adjustment d/o - notes \"it is awful\".  Admits that anxiety is still a common issue and really has lots of stressors with husbands NH issues and caring for him. Has new  and feels like he is 'really helping me. I got $14, 000 back from 2019 alone\".  \"What is wrong with that?\".    3. Chronic rhinitis and globus sensation/ phelgm on vocal cords - is affecting her singing in the choir. BOthersome.  THinks that restasis is causing issue and wants to consider stopping med.On med from Dr. Davis.  Denies post nasal gtt but has lots of thin watery nasal discharge \"all the time\".   4. IFG with strong FHx of DM II --> new dx DMII  Today - started on metformin 500mg BID at last visit.  Did not start med until December 1st as \"I was going to that class you sent me to\". \"I wanted to take the class first, I didn't know anything about anything\".  Notes \"I can call and talk to this fella anytime I want to\". Daughter insisted that she take long acting version.  Wonders if OK to start back to exercise. \"I want to know if it OK to go back\".   Notes wants to see endocrinology as \"my whole family sees them\".     5. " HM - got flu shot but 'they did not give me the high dose. I called and they said they did not see that in my chart that I needed the high dose'.        Review of Systems   Constitution: Negative for chills and fever.   Cardiovascular: Negative for chest pain and dyspnea on exertion.   Respiratory: Negative for shortness of breath.    Musculoskeletal: Positive for muscle weakness (in legs B and neck). Negative for myalgias, neck pain and stiffness.   Gastrointestinal: Negative for diarrhea, nausea and vomiting.   Psychiatric/Behavioral: Negative for altered mental status. The patient is nervous/anxious.        The following portions of the patient's history were reviewed and updated as appropriate: allergies, current medications, past medical history, past social history and problem list.      Current Outpatient Medications:   •  aspirin 81 MG EC tablet, Take 1 tablet by mouth Daily., Disp: 30 tablet, Rfl: 0  •  atorvastatin (LIPITOR) 20 MG tablet, Take 1 tablet by mouth Daily., Disp: 30 tablet, Rfl: 11  •  CALCIUM-VITAMIN D PO, Take 1 tablet by mouth 2 (Two) Times a Day. Dose unknown, Disp: , Rfl:   •  cycloSPORINE (RESTASIS) 0.05 % ophthalmic emulsion, Administer 1 drop to both eyes 2 (Two) Times a Day., Disp: , Rfl:   •  denosumab (PROLIA) 60 MG/ML solution syringe, Inject 60 mg under the skin into the appropriate area as directed Every 6 (Six) Months., Disp: , Rfl:   •  docusate sodium (COLACE) 100 MG capsule, Take 100 mg by mouth Every Night., Disp: , Rfl:   •  escitalopram (LEXAPRO) 10 MG tablet, TAKE ONE TABLET BY MOUTH DAILY, Disp: 90 tablet, Rfl: 0  •  Magnesium 250 MG tablet, Take 1 tablet by mouth Every Night., Disp: , Rfl:   •  metFORMIN ER (GLUCOPHAGE-XR) 500 MG 24 hr tablet, Take 500 mg by mouth Daily With Breakfast., Disp: , Rfl:   •  vitamin B-12 (CYANOCOBALAMIN) 1000 MCG tablet, Take 1 tablet by mouth Daily., Disp: 30 tablet, Rfl: 5    Vitals:    12/12/19 1108   BP: 122/60   Pulse: 74   Temp: 97.8  "°F (36.6 °C)   SpO2: 99%   Weight: 51.3 kg (113 lb)   Height: 160 cm (63\")     Body mass index is 20.02 kg/m².      Physical Exam   Constitutional: She is oriented to person, place, and time. She appears well-developed and well-nourished. No distress.   HENT:   Head: Normocephalic and atraumatic.   Mouth/Throat: Oropharynx is clear and moist. No oropharyngeal exudate.   Eyes: Pupils are equal, round, and reactive to light.   Neck: Normal range of motion. Neck supple.   Cardiovascular: Normal rate, regular rhythm and normal heart sounds.   Pulmonary/Chest: Effort normal and breath sounds normal. No respiratory distress. She has no wheezes. She has no rales.   Musculoskeletal: She exhibits no edema.   Lymphadenopathy:     She has no cervical adenopathy.   Neurological: She is alert and oriented to person, place, and time. No cranial nerve deficit. She exhibits normal muscle tone. Gait (uses cane to assist) normal.   Psychiatric: She has a normal mood and affect. Her behavior is normal.   Vitals reviewed.      Assessment/ Plan  Diagnoses and all orders for this visit:    Controlled type 2 diabetes mellitus without complication, without long-term current use of insulin (CMS/[facility])    Cerebrovascular accident (CVA), unspecified mechanism (CMS/[facility])  -     Ambulatory Referral to Physical Therapy Evaluate and treat    Neck muscle weakness  -     Ambulatory Referral to Physical Therapy Evaluate and treat    Leg weakness, bilateral  -     Ambulatory Referral to Physical Therapy Evaluate and treat    Chronic midline low back pain without sciatica  -     Ambulatory Referral to Physical Therapy Evaluate and treat    Dry eyes    Chronic rhinitis    Other orders  -     Discontinue: metFORMIN (GLUCOPHAGE) 500 MG tablet; Take 500 mg by mouth Daily With Breakfast.  -     metFORMIN ER (GLUCOPHAGE-XR) 500 MG 24 hr tablet; Take 500 mg by mouth Daily With Breakfast.        Return in about 3 months (around 3/12/2020) for " "Recheck.      Discussion:  Cristy Wilde is a 85 y.o. female RTC f/u:   1. Admission 5/2019 for acute cerebellar stroke (Small acute infarct of the upper left cerebellum on MRI).  Pt has negative inpt work-up and was released on statin and ASA and clopidogrel, now on ASA only per neuro -  Pt is doing well, tolerating meds well. Feels like needs more PT for neck strengthening and desires repeat PT eval. Order placed.  Pt si working in concert with her daughter who is PT in [place].   --> Statin use - LDL at goal with good HDL, no myalgias.   LFT\"s OK on labs.  C/W same.  2. Anxiety with element of adjustment d/o - issue is progressive over time and really is still pervasive even in our discussions today. Remains on Lexapro. I urged pt to get back to exercise as part of stress management.  Referral list for psychology given in past and will encourage pt to consult.   3. Chronic rhinitis and globus sensation s/p ENT eval in past showing no secondary etiology on nasal scope - persists. Thinks is due to Restasis and post nasal gtt issues. Will call optho to discuss and stop Restasis if OK. I d/w pt OK to add PPI daily as trial for acid suppression if no success with Restasis cessation.    4. IFG with strong FHx of DM II --> new dx DMII -  A1C progressive, but pt delayed med start until 12/1/19.  Insisted on XR version metformin after daughter told her BID 'would not work for her life'.  Is tolerating well at this time, cost is acceptable.  Will take med daily and trend A1C at f/u.  Exercise resumption advised. Completed DM ed.  Counseled pt on metformin today.  Pt desires endo eval and plans to self refer.    5. HM - Flu at pharmacy, was apparent not high dose.  I d/w pt will need that in future shots.     RTC 3 months, F labs prior ( CMP, A1C)  "

## 2019-12-30 RX ORDER — ESCITALOPRAM OXALATE 10 MG/1
TABLET ORAL
Qty: 90 TABLET | Refills: 0 | Status: SHIPPED | OUTPATIENT
Start: 2019-12-30 | End: 2020-03-09 | Stop reason: SDUPTHER

## 2020-02-07 ENCOUNTER — OFFICE VISIT (OUTPATIENT)
Dept: INTERNAL MEDICINE | Facility: CLINIC | Age: 85
End: 2020-02-07

## 2020-02-07 VITALS
TEMPERATURE: 97.6 F | RESPIRATION RATE: 16 BRPM | BODY MASS INDEX: 20.7 KG/M2 | HEART RATE: 50 BPM | WEIGHT: 116.8 LBS | HEIGHT: 63 IN | OXYGEN SATURATION: 100 % | DIASTOLIC BLOOD PRESSURE: 58 MMHG | SYSTOLIC BLOOD PRESSURE: 112 MMHG

## 2020-02-07 DIAGNOSIS — I63.9 CEREBROVASCULAR ACCIDENT (CVA), UNSPECIFIED MECHANISM (HCC): ICD-10-CM

## 2020-02-07 DIAGNOSIS — Z00.00 ENCOUNTER FOR PREVENTIVE HEALTH EXAMINATION: Primary | ICD-10-CM

## 2020-02-07 DIAGNOSIS — E11.9 CONTROLLED TYPE 2 DIABETES MELLITUS WITHOUT COMPLICATION, WITHOUT LONG-TERM CURRENT USE OF INSULIN (HCC): ICD-10-CM

## 2020-02-07 DIAGNOSIS — M80.00XA AGE-RELATED OSTEOPOROSIS WITH CURRENT PATHOLOGICAL FRACTURE, INITIAL ENCOUNTER: ICD-10-CM

## 2020-02-07 LAB
CHOLEST SERPL-MCNC: 148 MG/DL (ref 0–200)
GLUCOSE P FAST SERPL-MCNC: 150 MG/DL (ref 65–99)
HBA1C MFR BLD: 7.2 % (ref 4.8–5.6)
HDLC SERPL-MCNC: 66 MG/DL (ref 40–60)
LDLC SERPL CALC-MCNC: 67 MG/DL (ref 0–100)
TRIGL SERPL-MCNC: 76 MG/DL (ref 0–150)
VLDLC SERPL CALC-MCNC: 15.2 MG/DL (ref 5–40)

## 2020-02-07 PROCEDURE — 99397 PER PM REEVAL EST PAT 65+ YR: CPT | Performed by: INTERNAL MEDICINE

## 2020-02-07 NOTE — PROGRESS NOTES
Subjective   Cristy Wilde is a 85 y.o. female.     Chief Complaint   Patient presents with   • Annual Exam         In for initial visit, transition of care, and annual preventive exam.  Former patient of Dr. Kaur.  She had a stroke or TIA in May 2019.  Left-sided weakness.  Seems to have cleared up.  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  TTE at that time was unremarkable.  She developed diabetes mellitus shortly thereafter.  She has been very stressed over the continued decline of her  who has Alzheimer's disease.  He is in his third nursing home now.  Sounds like he is pretty end-stage.  Having swallowing difficulties.  Patient's memory seems to come and go a bit.  She is in the midst of some fights with Medicare about reimbursements.       The following portions of the patient's history were reviewed and updated as appropriate: allergies, current medications, past social history and problem list.    Outpatient Medications Marked as Taking for the 2/7/20 encounter (Office Visit) with Arben Wright MD   Medication Sig Dispense Refill   • aspirin 81 MG EC tablet Take 1 tablet by mouth Daily. 30 tablet 0   • atorvastatin (LIPITOR) 20 MG tablet Take 1 tablet by mouth Daily. 30 tablet 11   • CALCIUM-VITAMIN D PO Take 1 tablet by mouth 2 (Two) Times a Day. Dose unknown     • cycloSPORINE (RESTASIS) 0.05 % ophthalmic emulsion Administer 1 drop to both eyes 2 (Two) Times a Day.     • denosumab (PROLIA) 60 MG/ML solution syringe Inject 60 mg under the skin into the appropriate area as directed Every 6 (Six) Months.     • docusate sodium (COLACE) 100 MG capsule Take 100 mg by mouth Every Night.     • escitalopram (LEXAPRO) 10 MG tablet TAKE ONE TABLET BY MOUTH DAILY 90 tablet 0   • Magnesium 250 MG tablet Take 1 tablet by mouth Every Night.     • metFORMIN ER (GLUCOPHAGE-XR) 500 MG 24 hr tablet Take 500 mg by mouth Daily With Breakfast.      • vitamin B-12 (CYANOCOBALAMIN) 1000 MCG tablet Take 1 tablet by mouth Daily. 30 tablet 5       Review of Systems   Constitutional: Negative for appetite change, chills, fatigue and fever.   HENT: Negative for congestion, ear pain, hearing loss, nosebleeds, postnasal drip, rhinorrhea, sinus pressure and trouble swallowing.    Eyes: Negative for pain, itching and visual disturbance.   Respiratory: Negative for cough, chest tightness, shortness of breath and wheezing.    Cardiovascular: Negative for chest pain, palpitations and leg swelling.   Gastrointestinal: Positive for constipation. Negative for abdominal pain, anal bleeding, diarrhea, nausea, rectal pain and vomiting.   Endocrine: Negative for cold intolerance, heat intolerance and polyuria.   Genitourinary: Positive for frequency. Negative for difficulty urinating, dysuria, flank pain, hematuria and urgency.   Musculoskeletal: Positive for back pain. Negative for arthralgias and myalgias.   Skin: Negative for rash.   Allergic/Immunologic: Negative for environmental allergies.   Neurological: Negative for dizziness, syncope, speech difficulty, weakness, light-headedness, numbness and headaches.   Hematological: Does not bruise/bleed easily.   Psychiatric/Behavioral: Negative for agitation, confusion and sleep disturbance. The patient is nervous/anxious.        Objective   Vitals:    02/07/20 1520   BP: 112/58   Pulse: 50   Resp: 16   Temp: 97.6 °F (36.4 °C)   SpO2: 100%      Wt Readings from Last 3 Encounters:   02/07/20 53 kg (116 lb 12.8 oz)   12/12/19 51.3 kg (113 lb)   11/20/19 51.3 kg (113 lb)    Body mass index is 20.7 kg/m².      Physical Exam   Constitutional: She is oriented to person, place, and time. She appears well-developed and well-nourished.   HENT:   Head: Normocephalic and atraumatic.   Right Ear: External ear normal.   Left Ear: External ear normal.   Nose: Nose normal.   Mouth/Throat: Oropharynx is clear and moist.   Eyes: Pupils are  equal, round, and reactive to light. Conjunctivae and EOM are normal.   Neck: Normal range of motion. Neck supple. No JVD present. No thyromegaly present.   Cardiovascular: Normal rate, regular rhythm, normal heart sounds and intact distal pulses. Exam reveals no gallop.   No murmur heard.  Pulmonary/Chest: Effort normal and breath sounds normal. No respiratory distress. She has no wheezes. She has no rales.   Abdominal: Soft. Bowel sounds are normal. She exhibits no distension and no mass. There is no tenderness. There is no guarding. No hernia.   Musculoskeletal: Normal range of motion. She exhibits edema (1+/=) and deformity ( mild cervical flexion).   Lymphadenopathy:     She has no cervical adenopathy.   Neurological: She is alert and oriented to person, place, and time. She displays abnormal reflex ( left patellar hyperreflexic). No cranial nerve deficit. Coordination normal.   Skin: Skin is warm and dry.   Psychiatric: She has a normal mood and affect. Her behavior is normal. Judgment and thought content normal.   Nursing note and vitals reviewed.        Problem List Items Addressed This Visit        Cardiovascular and Mediastinum    Stroke (CMS/[facility])       Endocrine    Controlled type 2 diabetes mellitus without complication, without long-term current use of insulin (CMS/HCC)       Musculoskeletal and Integument    Osteoporosis      Other Visit Diagnoses     Encounter for preventive health examination    -  Primary        Assessment/Plan   In for initial visit, transition of care, and annual preventive exam.  I saw this patient many years ago, likely 20 years or more.  Irregardless she had a stroke or TIA in May 2019 and developed diabetes mellitus shortly after that time.  She is having some troubles with memory.  She is poorly with serial presidents.  Supportively with serial sevens.  A small infarct in the cerebellum was seen at the time of her stroke but not a good fit for a left hemiparesis with slurred  speech.  Irregardless her medications look good.  She is on an ACE inhibitor and a statin and aspirin.  Annual lab work was done December 2019 including CBC, CMP, lipids, A1c.  CBC was done September 2019.  She will need quarterly lab work including glucose, A1c, and lipids every 3 months.  Gets Prolia shots every 6 months for osteoporosis.  Finances are tight so it may be difficult to get neuropsychometric evaluation.  I suspect developing dementia.  We checked an MMSE at 29 and [place] at 22 today.  Clinically she seemed worse than that.  Asked a lot of questions over and over.  She is 5 HPP today.  She is having trouble with neck flexion.  That may be related to kyphosis and osteoporosis.  I see no evidence of myasthenia on exam today.  We will keep an eye on that.  She could not make up her mind on updating TD AP and Shingrix today.  She is going to defer that to a future date.    Prevention counseling was performed today. The counseling performed was routine health maintenance topics.               Dragon disclaimer:   Much of this encounter note is an electronic transcription/translation of spoken language to printed text. The electronic translation of spoken language may permit erroneous, or at times, nonsensical words or phrases to be inadvertently transcribed; Although I have reviewed the note for such errors, some may still exist.

## 2020-03-05 DIAGNOSIS — M81.0 OSTEOPOROSIS, UNSPECIFIED OSTEOPOROSIS TYPE, UNSPECIFIED PATHOLOGICAL FRACTURE PRESENCE: Primary | ICD-10-CM

## 2020-03-09 RX ORDER — ESCITALOPRAM OXALATE 10 MG/1
10 TABLET ORAL DAILY
Qty: 90 TABLET | Refills: 1 | Status: SHIPPED | OUTPATIENT
Start: 2020-03-09 | End: 2021-02-12

## 2020-03-23 ENCOUNTER — DOCUMENTATION (OUTPATIENT)
Dept: OTHER | Facility: HOSPITAL | Age: 85
End: 2020-03-23

## 2020-03-23 NOTE — PROGRESS NOTES
Contacted provider to advise that due to the current risks associated with COVID-19, we are moving non-essential treatments out at least 8 weeks so patient's Prolia has been delayed. MD confirmed appropriate.

## 2020-03-24 ENCOUNTER — APPOINTMENT (OUTPATIENT)
Dept: ONCOLOGY | Facility: HOSPITAL | Age: 85
End: 2020-03-24

## 2020-03-24 ENCOUNTER — APPOINTMENT (OUTPATIENT)
Dept: OTHER | Facility: HOSPITAL | Age: 85
End: 2020-03-24

## 2020-05-18 ENCOUNTER — TELEPHONE (OUTPATIENT)
Dept: INTERNAL MEDICINE | Facility: CLINIC | Age: 85
End: 2020-05-18

## 2020-05-18 NOTE — TELEPHONE ENCOUNTER
Was seen as a new patient-Physical on 2/7/2020, she states Medicare will not pay for the visit. She has spoken with Medicare and insist that this can be re-billed as just a new patient appointment they will pay for it, [phone].

## 2020-05-18 NOTE — TELEPHONE ENCOUNTER
Spoke w/ patient about physicals being a requirement for all new patients. Patient signed an ABN form on the day of her visit consenting to the visit with an estimated out of pocket expense. Patient understands the visit will not be re-billed.

## 2020-05-20 ENCOUNTER — OFFICE VISIT (OUTPATIENT)
Dept: NEUROLOGY | Facility: CLINIC | Age: 85
End: 2020-05-20

## 2020-05-20 DIAGNOSIS — E11.9 CONTROLLED TYPE 2 DIABETES MELLITUS WITHOUT COMPLICATION, WITHOUT LONG-TERM CURRENT USE OF INSULIN (HCC): ICD-10-CM

## 2020-05-20 DIAGNOSIS — F43.21 GRIEVING: ICD-10-CM

## 2020-05-20 DIAGNOSIS — I63.9 CEREBROVASCULAR ACCIDENT (CVA), UNSPECIFIED MECHANISM (HCC): Primary | ICD-10-CM

## 2020-05-20 PROBLEM — Z78.9 PATIENT HAD NO FALLS IN PAST YEAR: Status: ACTIVE | Noted: 2020-05-20

## 2020-05-20 PROCEDURE — 99442 PR PHYS/QHP TELEPHONE EVALUATION 11-20 MIN: CPT | Performed by: NURSE PRACTITIONER

## 2020-05-20 RX ORDER — METFORMIN HYDROCHLORIDE 500 MG/1
TABLET, EXTENDED RELEASE ORAL
Qty: 90 TABLET | Refills: 4 | OUTPATIENT
Start: 2020-05-20

## 2020-05-20 NOTE — PROGRESS NOTES
DOS: 2020  NAME: Cristy Wilde   : 1934  PCP: Arben Wright MD    Chief Complaint   Patient presents with   • Stroke        Neurological Problem and Interval History:  85 y.o. left-handed female with a Hx of Diverticulitis and osteoarthritis without any other significant medical history who I evaluated via telephone today in follow-up for small acute infarction within the upper left cerebellum.     Patient was last seen in follow-up by me on 2019 for the same thing at that time she denied any new signs and/or symptoms of stroke though she reported some residual persistent gait and imbalance issues which had improved due to physical therapy.  Since that time, she reports that physical therapy had to be discontinued due to COVID restrictions.  She does have some mild gait and imbalance issues and uses a cane to assist with mobility.  She denies any falls since last evaluation.  She reports her systolic blood pressure is consistently less than 120 and diastolic is consistently less than 80.  She is currently taking Lexapro 10 mg daily.  She tells me that her  passed away on  and she is grieving him although she is glad that he is no longer suffering.  She expresses some notion regarding not being able to have a [facility] service; [facility] service planned/reschedule for 2020.  She continues to take atorvastatin 20 mg, aspirin 81 mg and B12 replacement.  Today she asked if it would be okay if she took vitamin supplement; agreed with adding.  She reports that she feels like her diabetes is poorly controlled and she continues to have lower extremity leg swelling.  She does report she has some urinary incontinence as she nears the bathroom.  No nocturnal or other urinary incontinence reported.  She reports good quality of sleep.  She denies any other complaints and/or concerns on my exam.      Review of Systems:        Review of Systems   Constitutional: Negative for  activity change, appetite change and unexpected weight change.   HENT: Negative for facial swelling, trouble swallowing and voice change.    Eyes: Negative for photophobia, pain and visual disturbance.   Respiratory: Negative for chest tightness, shortness of breath and wheezing.    Cardiovascular: Positive for leg swelling. Negative for chest pain and palpitations.   Gastrointestinal: Negative for abdominal pain, nausea and vomiting.   Endocrine: Negative for cold intolerance and heat intolerance.   Musculoskeletal: Positive for gait problem. Negative for arthralgias, back pain, joint swelling, myalgias, neck pain and neck stiffness.   Neurological: Negative for dizziness, tremors, seizures, syncope, facial asymmetry, speech difficulty, weakness, light-headedness, numbness and headaches.   Hematological: Bruises/bleeds easily.   Psychiatric/Behavioral: Negative for agitation, behavioral problems, confusion, decreased concentration, dysphoric mood, hallucinations, self-injury, sleep disturbance and suicidal ideas. The patient is nervous/anxious (nervous). The patient is not hyperactive.          Current Outpatient Medications:   •  aspirin 81 MG EC tablet, Take 1 tablet by mouth Daily., Disp: 30 tablet, Rfl: 0  •  atorvastatin (LIPITOR) 20 MG tablet, Take 1 tablet by mouth Daily., Disp: 30 tablet, Rfl: 11  •  CALCIUM-VITAMIN D PO, Take 1 tablet by mouth 2 (Two) Times a Day. Dose unknown, Disp: , Rfl:   •  cycloSPORINE (RESTASIS) 0.05 % ophthalmic emulsion, Administer 1 drop to both eyes 2 (Two) Times a Day., Disp: , Rfl:   •  denosumab (PROLIA) 60 MG/ML solution syringe, Inject 60 mg under the skin into the appropriate area as directed Every 6 (Six) Months., Disp: , Rfl:   •  docusate sodium (COLACE) 100 MG capsule, Take 100 mg by mouth Every Night., Disp: , Rfl:   •  escitalopram (LEXAPRO) 10 MG tablet, Take 1 tablet by mouth Daily., Disp: 90 tablet, Rfl: 01  •  Magnesium 250 MG tablet, Take 1 tablet by mouth Every  "Night., Disp: , Rfl:   •  metFORMIN ER (GLUCOPHAGE-XR) 500 MG 24 hr tablet, Take 500 mg by mouth Daily With Breakfast., Disp: , Rfl:   •  vitamin B-12 (CYANOCOBALAMIN) 1000 MCG tablet, Take 1 tablet by mouth Daily., Disp: 30 tablet, Rfl: 5    \"The following portions of the patient's history were reviewed and updated as appropriate: allergies, current medications, past family history, past medical history, past social history, past surgical history and problem list.\"  Review and Interpretation of Imaging:  No new images reviewed  Laboratory Results:             Lab Results   Component Value Date    HGBA1C 7.20 (H) 02/07/2020         Lab Results   Component Value Date    CHOL 143 05/14/2019         Lab Results   Component Value Date    HDL 66 (H) 02/07/2020    HDL 70 (H) 12/09/2019    HDL 72 (H) 09/03/2019         Lab Results   Component Value Date    LDL 67 02/07/2020    LDL 57 12/09/2019    LDL 77 09/03/2019         Lab Results   Component Value Date    TRIG 76 02/07/2020    TRIG 70 12/09/2019    TRIG 58 09/03/2019     No results found for: RPR  Lab Results   Component Value Date    TSH 1.970 03/29/2019     Lab Results   Component Value Date    NUPSKMIL79 367 05/13/2019         Impression:   1.  Left cerebellum infarct; small  2.  B12 deficiency  3.  Type 2 diabetes mellitus      Plan:   Continue Lexapro, B12, aspirin and statin as written   Blood pressure control to <130/80   Goal LDL <70-recommend high dose statins-    Serum glucose < 140   Call 911 for stroke any stroke symptoms   Follow-up w/ me in 1 year; sooner if needed   This patient has consented to a telehealth visit via  telephone. The visit was scheduled as a  telephone visit to comply with patient safety concerns in accordance with CDC recommendations. I spent  35 minutes in total including but not limited to the 20 minutes spent in direct conversation with this patient.     Cristy was seen today for stroke.    Diagnoses and all orders for this " visit:    Cerebrovascular accident (CVA), unspecified mechanism (CMS/[facility])    Controlled type 2 diabetes mellitus without complication, without long-term current use of insulin (CMS/[facility])    Grieving

## 2020-05-21 ENCOUNTER — APPOINTMENT (OUTPATIENT)
Dept: OTHER | Facility: HOSPITAL | Age: 85
End: 2020-05-21

## 2020-05-21 ENCOUNTER — INFUSION (OUTPATIENT)
Dept: ONCOLOGY | Facility: HOSPITAL | Age: 85
End: 2020-05-21

## 2020-05-21 VITALS
HEART RATE: 70 BPM | WEIGHT: 113.4 LBS | SYSTOLIC BLOOD PRESSURE: 127 MMHG | OXYGEN SATURATION: 97 % | TEMPERATURE: 98.7 F | DIASTOLIC BLOOD PRESSURE: 69 MMHG | BODY MASS INDEX: 19.36 KG/M2 | HEIGHT: 64 IN | RESPIRATION RATE: 18 BRPM

## 2020-05-21 DIAGNOSIS — M80.00XA AGE-RELATED OSTEOPOROSIS WITH CURRENT PATHOLOGICAL FRACTURE, INITIAL ENCOUNTER: Primary | ICD-10-CM

## 2020-05-21 DIAGNOSIS — I63.9 CEREBROVASCULAR ACCIDENT (CVA), UNSPECIFIED MECHANISM (HCC): Primary | ICD-10-CM

## 2020-05-21 DIAGNOSIS — E83.52 SERUM CALCIUM ELEVATED: Primary | ICD-10-CM

## 2020-05-21 LAB
CALCIUM SPEC-SCNC: 11 MG/DL (ref 8.6–10.5)
MAGNESIUM SERPL-MCNC: 2.2 MG/DL (ref 1.6–2.4)
PHOSPHATE SERPL-MCNC: 4 MG/DL (ref 2.5–4.5)

## 2020-05-21 PROCEDURE — 96372 THER/PROPH/DIAG INJ SC/IM: CPT

## 2020-05-21 PROCEDURE — 83735 ASSAY OF MAGNESIUM: CPT | Performed by: INTERNAL MEDICINE

## 2020-05-21 PROCEDURE — 84100 ASSAY OF PHOSPHORUS: CPT | Performed by: INTERNAL MEDICINE

## 2020-05-21 PROCEDURE — 25010000002 DENOSUMAB 60 MG/ML SOLUTION PREFILLED SYRINGE: Performed by: INTERNAL MEDICINE

## 2020-05-21 PROCEDURE — 82310 ASSAY OF CALCIUM: CPT | Performed by: INTERNAL MEDICINE

## 2020-05-21 RX ADMIN — DENOSUMAB 60 MG: 60 INJECTION SUBCUTANEOUS at 15:35

## 2020-06-21 ENCOUNTER — RESULTS ENCOUNTER (OUTPATIENT)
Dept: INTERNAL MEDICINE | Facility: CLINIC | Age: 85
End: 2020-06-21

## 2020-06-21 DIAGNOSIS — E83.52 SERUM CALCIUM ELEVATED: ICD-10-CM

## 2020-06-22 ENCOUNTER — OFFICE VISIT (OUTPATIENT)
Dept: INTERNAL MEDICINE | Facility: CLINIC | Age: 85
End: 2020-06-22

## 2020-06-22 DIAGNOSIS — M80.00XA AGE-RELATED OSTEOPOROSIS WITH CURRENT PATHOLOGICAL FRACTURE, INITIAL ENCOUNTER: Primary | ICD-10-CM

## 2020-06-23 LAB
CALCIUM SERPL-MCNC: 9 MG/DL (ref 8.7–10.3)
INTACT PTH: ABNORMAL
PTH-INTACT SERPL-MCNC: 82 PG/ML (ref 15–65)

## 2020-07-10 ENCOUNTER — OFFICE VISIT (OUTPATIENT)
Dept: INTERNAL MEDICINE | Facility: CLINIC | Age: 85
End: 2020-07-10

## 2020-07-10 VITALS
OXYGEN SATURATION: 98 % | HEART RATE: 75 BPM | WEIGHT: 112.4 LBS | TEMPERATURE: 97.3 F | DIASTOLIC BLOOD PRESSURE: 56 MMHG | HEIGHT: 64 IN | RESPIRATION RATE: 16 BRPM | BODY MASS INDEX: 19.19 KG/M2 | SYSTOLIC BLOOD PRESSURE: 110 MMHG

## 2020-07-10 DIAGNOSIS — R00.2 PALPITATIONS: ICD-10-CM

## 2020-07-10 DIAGNOSIS — M81.0 OSTEOPOROSIS, UNSPECIFIED OSTEOPOROSIS TYPE, UNSPECIFIED PATHOLOGICAL FRACTURE PRESENCE: ICD-10-CM

## 2020-07-10 DIAGNOSIS — E11.9 CONTROLLED TYPE 2 DIABETES MELLITUS WITHOUT COMPLICATION, WITHOUT LONG-TERM CURRENT USE OF INSULIN (HCC): Primary | ICD-10-CM

## 2020-07-10 DIAGNOSIS — I63.9 CEREBROVASCULAR ACCIDENT (CVA), UNSPECIFIED MECHANISM (HCC): ICD-10-CM

## 2020-07-10 DIAGNOSIS — M80.00XA AGE-RELATED OSTEOPOROSIS WITH CURRENT PATHOLOGICAL FRACTURE, INITIAL ENCOUNTER: ICD-10-CM

## 2020-07-10 PROBLEM — Z78.9 PATIENT HAD NO FALLS IN PAST YEAR: Status: RESOLVED | Noted: 2020-05-20 | Resolved: 2020-07-10

## 2020-07-10 PROCEDURE — 99214 OFFICE O/P EST MOD 30 MIN: CPT | Performed by: INTERNAL MEDICINE

## 2020-07-10 PROCEDURE — 90471 IMMUNIZATION ADMIN: CPT | Performed by: INTERNAL MEDICINE

## 2020-07-10 PROCEDURE — 93000 ELECTROCARDIOGRAM COMPLETE: CPT | Performed by: INTERNAL MEDICINE

## 2020-07-10 PROCEDURE — 90715 TDAP VACCINE 7 YRS/> IM: CPT | Performed by: INTERNAL MEDICINE

## 2020-07-10 NOTE — PROGRESS NOTES
Subjective   Cristy Wilde is a 85 y.o. female.     Chief Complaint   Patient presents with   • Hypertension   • Hyperlipidemia   • Diabetes   • Osteoporosis         Hyperlipidemia   This is a chronic problem. The current episode started more than 1 year ago. The problem is controlled. Pertinent negatives include no chest pain or shortness of breath. Current antihyperlipidemic treatment includes statins. The current treatment provides significant improvement of lipids. There are no compliance problems.    Diabetes   She presents for her follow-up diabetic visit. She has type 2 diabetes mellitus. Her disease course has been stable. Pertinent negatives for diabetes include no chest pain and no fatigue. Symptoms are stable. Risk factors for coronary artery disease include diabetes mellitus and dyslipidemia. Her weight is stable. An ACE inhibitor/angiotensin II receptor blocker is not being taken. Eye exam is not current.   Osteoporosis   This is a chronic problem. The current episode started more than 1 year ago. The problem occurs constantly. The problem has been unchanged. Pertinent negatives include no abdominal pain, chest pain, chills, coughing, fatigue, fever, nausea or vomiting.        The following portions of the patient's history were reviewed and updated as appropriate: allergies, current medications, past social history and problem list.    Outpatient Medications Marked as Taking for the 7/10/20 encounter (Office Visit) with Arben Wright MD   Medication Sig Dispense Refill   • aspirin 81 MG EC tablet Take 1 tablet by mouth Daily. 30 tablet 0   • atorvastatin (LIPITOR) 20 MG tablet Take 1 tablet by mouth Daily. 30 tablet 11   • CALCIUM-VITAMIN D PO Take 1 tablet by mouth 2 (Two) Times a Day. Dose unknown     • cycloSPORINE (RESTASIS) 0.05 % ophthalmic emulsion Administer 1 drop to both eyes 2 (Two) Times a Day.     • denosumab (PROLIA) 60 MG/ML solution syringe Inject 60 mg under the skin into  the appropriate area as directed Every 6 (Six) Months.     • docusate sodium (COLACE) 100 MG capsule Take 100 mg by mouth Every Night.     • escitalopram (LEXAPRO) 10 MG tablet Take 1 tablet by mouth Daily. 90 tablet 01   • Magnesium 250 MG tablet Take 1 tablet by mouth Every Night.     • metFORMIN ER (GLUCOPHAGE-XR) 500 MG 24 hr tablet Take 500 mg by mouth Daily With Breakfast.     • vitamin B-12 (CYANOCOBALAMIN) 1000 MCG tablet Take 1 tablet by mouth Daily. 30 tablet 5       Review of Systems   Constitutional: Negative for chills, fatigue and fever.   Respiratory: Negative for cough, shortness of breath and wheezing.    Cardiovascular: Negative for chest pain, palpitations and leg swelling.   Gastrointestinal: Positive for constipation. Negative for abdominal pain, diarrhea, nausea and vomiting.       Objective   Vitals:    07/10/20 1240   BP: 110/56   Pulse: 75   Resp: 16   Temp: 97.3 °F (36.3 °C)   SpO2: 98%      Wt Readings from Last 3 Encounters:   07/10/20 51 kg (112 lb 6.4 oz)   05/21/20 51.4 kg (113 lb 6.4 oz)   02/07/20 53 kg (116 lb 12.8 oz)    Body mass index is 19.6 kg/m².      Physical Exam   Constitutional: She appears well-developed and well-nourished. No distress.   HENT:   Head: Normocephalic and atraumatic.   Neck: Carotid bruit is not present.   Cardiovascular: Normal rate, regular rhythm, normal heart sounds and intact distal pulses. Exam reveals no gallop.   No murmur heard.  Pulmonary/Chest: Effort normal and breath sounds normal. No respiratory distress. She has no wheezes. She has no rales.   Abdominal: Soft. Bowel sounds are normal. She exhibits no mass. There is no tenderness. There is no guarding.         Problem List Items Addressed This Visit        Cardiovascular and Mediastinum    Stroke (CMS/HCC)       Endocrine    Controlled type 2 diabetes mellitus without complication, without long-term current use of insulin (CMS/HCC) - Primary       Musculoskeletal and Integument    Osteoporosis     Osteoporosis        Assessment/Plan   In for follow-up of diabetes and hyperlipidemia.  She had a stroke or TIA in May 2019 and developed diabetes mellitus shortly after that time.  She is having some troubles with memory.  She is poorly with serial presidents.  Supportively with serial sevens.  A small infarct in the cerebellum was seen at the time of her stroke but not a good fit for a left hemiparesis with slurred speech.  Irregardless her medications look good.  She is on a statin and aspirin.  Annual lab work was done December 2019 including CBC, CMP, lipids, A1c.  She will need quarterly lab work including glucose, A1c, and lipids every 3 months.  Gets Prolia shots every 6 months for osteoporosis.  Finances are tight so it may be difficult to get neuropsychometric evaluation.  I suspect developing dementia.  We checked an MMSE at 29 and [place] at 22 today.  Clinically she seemed worse than that.  She is 4.5 HPP today.  TD AP updated today.  Due for Shingrix today.  Heart rate sounds like atrial fib with RVR today when I first examined her.  We will check an EKG. back in normal sinus rhythm by the time we get the EKG. we will check a 72-hour Holter.      ECG 12 Lead  Date/Time: 7/10/2020 2:12 PM  Performed by: Arben Wright MD  Authorized by: Arben Wright MD   Comparison: compared with previous ECG from 5/13/2019  Similar to previous ECG  Rhythm: sinus rhythm  Rate: normal  Conduction: conduction normal  ST Segments: ST segments normal  T Waves: T waves normal  QRS axis: normal  Other: no other findings    Clinical impression: normal ECG  Comments: Normal sinus rhythm.  Heart rate is 64.  There is increased voltage in the chest leads..  This is an otherwise normal EKG.  There is no change from the prior EKG dated 5/13/2019 other than increased voltage on this EKG.                         Dragon disclaimer:   Much of this encounter note is an electronic transcription/translation of spoken language to  printed text. The electronic translation of spoken language may permit erroneous, or at times, nonsensical words or phrases to be inadvertently transcribed; Although I have reviewed the note for such errors, some may still exist.

## 2020-07-10 NOTE — PATIENT INSTRUCTIONS
"https://www.cdc.gov/vaccines/hcp/vis/vis-statements/tdap.pdf\">   Tdap Vaccine (Tetanus, Diphtheria and Pertussis): What You Need to Know  1. Why get vaccinated?  Tetanus, diphtheria and pertussis are very serious diseases. Tdap vaccine can protect us from these diseases. And, Tdap vaccine given to pregnant women can protect  babies against pertussis..  TETANUS (Lockjaw) is rare in the [place] today. It causes painful muscle tightening and stiffness, usually all over the body.  · It can lead to tightening of muscles in the head and neck so you can't open your mouth, swallow, or sometimes even breathe. Tetanus kills about 1 out of 10 people who are infected even after receiving the best medical care.  DIPHTHERIA is also rare in the [place] today. It can cause a thick coating to form in the back of the throat.  · It can lead to breathing problems, heart failure, paralysis, and death.  PERTUSSIS (Whooping Cough) causes severe coughing spells, which can cause difficulty breathing, vomiting and disturbed sleep.  · It can also lead to weight loss, incontinence, and rib fractures. Up to 2 in 100 adolescents and 5 in 100 adults with pertussis are hospitalized or have complications, which could include pneumonia or death.  These diseases are caused by bacteria. Diphtheria and pertussis are spread from person to person through secretions from coughing or sneezing. Tetanus enters the body through cuts, scratches, or wounds.  Before vaccines, as many as 200,000 cases of diphtheria, 200,000 cases of pertussis, and hundreds of cases of tetanus, were reported in the [place] each year. Since vaccination began, reports of cases for tetanus and diphtheria have dropped by about 99% and for pertussis by about 80%.  2. Tdap vaccine  Tdap vaccine can protect adolescents and adults from tetanus, diphtheria, and pertussis. One dose of Tdap is routinely given at age 11 or 12. People who did not get Tdap at that age " should get it as soon as possible.  Tdap is especially important for healthcare professionals and anyone having close contact with a baby younger than 12 months.  Pregnant women should get a dose of Tdap during every pregnancy, to protect the  from pertussis. Infants are most at risk for severe, life-threatening complications from pertussis.  Another vaccine, called Td, protects against tetanus and diphtheria, but not pertussis. A Td booster should be given every 10 years. Tdap may be given as one of these boosters if you have never gotten Tdap before. Tdap may also be given after a severe cut or burn to prevent tetanus infection.  Your doctor or the person giving you the vaccine can give you more information.  Tdap may safely be given at the same time as other vaccines.  3. Some people should not get this vaccine  · A person who has ever had a life-threatening allergic reaction after a previous dose of any diphtheria, tetanus or pertussis containing vaccine, OR has a severe allergy to any part of this vaccine, should not get Tdap vaccine. Tell the person giving the vaccine about any severe allergies.  · Anyone who had coma or long repeated seizures within 7 days after a childhood dose of DTP or DTaP, or a previous dose of Tdap, should not get Tdap, unless a cause other than the vaccine was found. They can still get Td.  · Talk to your doctor if you:  ? have seizures or another nervous system problem,  ? had severe pain or swelling after any vaccine containing diphtheria, tetanus or pertussis,  ? ever had a condition called Guillain-Barré Syndrome (GBS),  ? aren't feeling well on the day the shot is scheduled.  4. Risks  With any medicine, including vaccines, there is a chance of side effects. These are usually mild and go away on their own. Serious reactions are also possible but are rare.  Most people who get Tdap vaccine do not have any problems with it.  Mild problems following Tdap  (Did not interfere  with activities)  · Pain where the shot was given (about 3 in 4 adolescents or 2 in 3 adults)  · Redness or swelling where the shot was given (about 1 person in 5)  · Mild fever of at least 100.4°F (up to about 1 in 25 adolescents or 1 in 100 adults)  · Headache (about 3 or 4 people in 10)  · Tiredness (about 1 person in 3 or 4)  · Nausea, vomiting, diarrhea, stomach ache (up to 1 in 4 adolescents or 1 in 10 adults)  · Chills, sore joints (about 1 person in 10)  · Body aches (about 1 person in 3 or 4)  · Rash, swollen glands (uncommon)  Moderate problems following Tdap  (Interfered with activities, but did not require medical attention)  · Pain where the shot was given (up to 1 in 5 or 6)  · Redness or swelling where the shot was given (up to about 1 in 16 adolescents or 1 in 12 adults)  · Fever over 102°F (about 1 in 100 adolescents or 1 in 250 adults)  · Headache (about 1 in 7 adolescents or 1 in 10 adults)  · Nausea, vomiting, diarrhea, stomach ache (up to 1 or 3 people in 100)  · Swelling of the entire arm where the shot was given (up to about 1 in 500).  Severe problems following Tdap  (Unable to perform usual activities; required medical attention)  · Swelling, severe pain, bleeding and redness in the arm where the shot was given (rare).  Problems that could happen after any vaccine:  · People sometimes faint after a medical procedure, including vaccination. Sitting or lying down for about 15 minutes can help prevent fainting, and injuries caused by a fall. Tell your doctor if you feel dizzy, or have vision changes or ringing in the ears.  · Some people get severe pain in the shoulder and have difficulty moving the arm where a shot was given. This happens very rarely.  · Any medication can cause a severe allergic reaction. Such reactions from a vaccine are very rare, estimated at fewer than 1 in a million doses, and would happen within a few minutes to a few hours after the vaccination.  As with any medicine,  there is a very remote chance of a vaccine causing a serious injury or death.  The safety of vaccines is always being monitored. For more information, visit: www.cdc.gov/vaccinesafety/  5. What if there is a serious problem?  What should I look for?  · Look for anything that concerns you, such as signs of a severe allergic reaction, very high fever, or unusual behavior.  Signs of a severe allergic reaction can include hives, swelling of the face and throat, difficulty breathing, a fast heartbeat, dizziness, and weakness. These would usually start a few minutes to a few hours after the vaccination.  What should I do?  · If you think it is a severe allergic reaction or other emergency that can't wait, call 9-1-1 or get the person to the nearest hospital. Otherwise, call your doctor.  · Afterward, the reaction should be reported to the Vaccine Adverse Event Reporting System (VAERS). Your doctor might file this report, or you can do it yourself through the VAERS web site at www.vaers.[facility].gov, or by calling 1-782.145.7187.  VAERS does not give medical advice.  6. The National Vaccine Injury Compensation Program  The National Vaccine Injury Compensation Program (VICP) is a federal program that was created to compensate people who may have been injured by certain vaccines.  Persons who believe they may have been injured by a vaccine can learn about the program and about filing a claim by calling 1-761.347.9642 or visiting the VICP website at www.hrsa.gov/vaccinecompensation. There is a time limit to file a claim for compensation.  7. How can I learn more?  · Ask your doctor. He or she can give you the vaccine package insert or suggest other sources of information.  · Call your local or state health department.  · Contact the Centers for Disease Control and Prevention (CDC):  ? Call 1-101.333.9476 (6-909-XHI-INFO) or  ? Visit CDC's website at www.cdc.gov/vaccines  Vaccine Information Statement Tdap Vaccine (2/24/2015)  This  information is not intended to replace advice given to you by your health care provider. Make sure you discuss any questions you have with your health care provider.  Document Released: 06/18/2013 Document Revised: 08/05/2019 Document Reviewed: 08/05/2019  [employer] Interactive Patient Education © 2020 [employer]

## 2020-07-14 ENCOUNTER — TELEPHONE (OUTPATIENT)
Dept: INTERNAL MEDICINE | Facility: CLINIC | Age: 85
End: 2020-07-14

## 2020-07-14 NOTE — TELEPHONE ENCOUNTER
Tell patient we need to get a 25 OH VITAMIN D level on her.  The repeat calcium came back normal making it difficult to diagnose a parathyroid issue for her.

## 2020-07-14 NOTE — TELEPHONE ENCOUNTER
Pt calling to request results or next abad about high calcium levels & her repeat calcium level with PTH level from 6/22/20. Please advise.

## 2020-07-16 DIAGNOSIS — E11.9 CONTROLLED TYPE 2 DIABETES MELLITUS WITHOUT COMPLICATION, WITHOUT LONG-TERM CURRENT USE OF INSULIN (HCC): Primary | ICD-10-CM

## 2020-07-16 RX ORDER — LANCETS 33 GAUGE
1 EACH MISCELLANEOUS DAILY
Qty: 100 EACH | Refills: 3 | Status: SHIPPED | OUTPATIENT
Start: 2020-07-16 | End: 2020-11-17 | Stop reason: SDUPTHER

## 2020-07-20 ENCOUNTER — OFFICE VISIT (OUTPATIENT)
Dept: INTERNAL MEDICINE | Facility: CLINIC | Age: 85
End: 2020-07-20

## 2020-07-20 VITALS
OXYGEN SATURATION: 98 % | DIASTOLIC BLOOD PRESSURE: 64 MMHG | HEIGHT: 64 IN | BODY MASS INDEX: 19.29 KG/M2 | SYSTOLIC BLOOD PRESSURE: 120 MMHG | RESPIRATION RATE: 16 BRPM | WEIGHT: 113 LBS | HEART RATE: 78 BPM | TEMPERATURE: 97.5 F

## 2020-07-20 DIAGNOSIS — R89.9 ABNORMAL LABORATORY TEST RESULT: Primary | ICD-10-CM

## 2020-07-20 DIAGNOSIS — E83.52 SERUM CALCIUM ELEVATED: ICD-10-CM

## 2020-07-20 DIAGNOSIS — B00.1 COLD SORE: Primary | ICD-10-CM

## 2020-07-20 LAB — 25(OH)D3+25(OH)D2 SERPL-MCNC: 31.7 NG/ML (ref 30–100)

## 2020-07-20 PROCEDURE — 99213 OFFICE O/P EST LOW 20 MIN: CPT | Performed by: INTERNAL MEDICINE

## 2020-07-20 RX ORDER — METFORMIN HYDROCHLORIDE 500 MG/1
500 TABLET, EXTENDED RELEASE ORAL
Qty: 90 TABLET | Refills: 1 | Status: SHIPPED | OUTPATIENT
Start: 2020-07-20 | End: 2021-03-25

## 2020-07-20 RX ORDER — VALACYCLOVIR HYDROCHLORIDE 1 G/1
2000 TABLET, FILM COATED ORAL 2 TIMES DAILY
Qty: 4 TABLET | Refills: 0 | Status: SHIPPED | OUTPATIENT
Start: 2020-07-20 | End: 2021-01-15

## 2020-07-20 NOTE — PROGRESS NOTES
Subjective   Cristy Wilde is a 85 y.o. female.     Chief Complaint   Patient presents with   • Blister   • Insect Bite         Blister   This is a new problem. The current episode started in the past 7 days. The problem occurs constantly. Pertinent negatives include no chills, fever or rash. Nothing aggravates the symptoms.        The following portions of the patient's history were reviewed and updated as appropriate: allergies, current medications, past social history and problem list.    Outpatient Medications Marked as Taking for the 7/20/20 encounter (Office Visit) with Arben Wright MD   Medication Sig Dispense Refill   • aspirin 81 MG EC tablet Take 1 tablet by mouth Daily. 30 tablet 0   • atorvastatin (LIPITOR) 20 MG tablet Take 1 tablet by mouth Daily. 30 tablet 11   • cycloSPORINE (RESTASIS) 0.05 % ophthalmic emulsion Administer 1 drop to both eyes 2 (Two) Times a Day.     • denosumab (PROLIA) 60 MG/ML solution syringe Inject 60 mg under the skin into the appropriate area as directed Every 6 (Six) Months.     • docusate sodium (COLACE) 100 MG capsule Take 100 mg by mouth Every Night.     • escitalopram (LEXAPRO) 10 MG tablet Take 1 tablet by mouth Daily. 90 tablet 01   • Magnesium 250 MG tablet Take 1 tablet by mouth Every Night.     • metFORMIN ER (GLUCOPHAGE-XR) 500 MG 24 hr tablet Take 500 mg by mouth Daily With Breakfast.     • OneTouch Delica Lancets 33G misc Take 1 each by mouth Daily. 100 each 3   • vitamin B-12 (CYANOCOBALAMIN) 1000 MCG tablet Take 1 tablet by mouth Daily. 30 tablet 5       Review of Systems   Constitutional: Negative for chills and fever.   HENT: Positive for mouth sores.    Skin: Negative for rash.       Objective   Vitals:    07/20/20 1438   BP: 120/64   Pulse: 78   Resp: 16   Temp: 97.5 °F (36.4 °C)   SpO2: 98%      Wt Readings from Last 3 Encounters:   07/20/20 51.3 kg (113 lb)   07/10/20 51 kg (112 lb 6.4 oz)   05/21/20 51.4 kg (113 lb 6.4 oz)    Body mass index  is 19.7 kg/m².      Physical Exam   Constitutional: She appears well-developed and well-nourished.   HENT:   Small white plaque on center of the lower lip         Problem List Items Addressed This Visit     None      Visit Diagnoses     Cold sore    -  Primary        Assessment/Plan   In with a lesion on her lower lip for about 4 days.  It looks like a white plaque.  Likely an early cold sore.  Painful.  No other lesions.  Will begin on valacyclovir 2000 mg twice daily for 2 doses.             Dragon disclaimer:   Much of this encounter note is an electronic transcription/translation of spoken language to printed text. The electronic translation of spoken language may permit erroneous, or at times, nonsensical words or phrases to be inadvertently transcribed; Although I have reviewed the note for such errors, some may still exist.

## 2020-07-21 RX ORDER — GLUCOSAMINE HCL 500 MG
3000 TABLET ORAL DAILY
COMMUNITY

## 2020-07-31 ENCOUNTER — HOSPITAL ENCOUNTER (OUTPATIENT)
Dept: GENERAL RADIOLOGY | Facility: HOSPITAL | Age: 85
Discharge: HOME | End: 2020-07-31

## 2020-07-31 ENCOUNTER — OFFICE VISIT (OUTPATIENT)
Dept: INTERNAL MEDICINE | Facility: CLINIC | Age: 85
End: 2020-07-31

## 2020-07-31 VITALS
HEART RATE: 84 BPM | RESPIRATION RATE: 16 BRPM | BODY MASS INDEX: 19.46 KG/M2 | OXYGEN SATURATION: 98 % | TEMPERATURE: 97.3 F | WEIGHT: 114 LBS | DIASTOLIC BLOOD PRESSURE: 68 MMHG | SYSTOLIC BLOOD PRESSURE: 108 MMHG | HEIGHT: 64 IN

## 2020-07-31 DIAGNOSIS — T07.XXXA MULTIPLE CONTUSIONS: Primary | ICD-10-CM

## 2020-07-31 PROCEDURE — 99213 OFFICE O/P EST LOW 20 MIN: CPT | Performed by: INTERNAL MEDICINE

## 2020-07-31 PROCEDURE — 71101 X-RAY EXAM UNILAT RIBS/CHEST: CPT

## 2020-07-31 NOTE — PROGRESS NOTES
Subjective   Cristy Wilde is a 85 y.o. female.     Chief Complaint   Patient presents with   • Bleeding/Bruising   • Fall     x 5 days ago   • Back Pain         Fall   The accident occurred 5 to 7 days ago. The fall occurred while walking. There was no blood loss. The point of impact was the right elbow. The pain is present in the back. The pain is moderate. The symptoms are aggravated by movement. Pertinent negatives include no fever or headaches.        The following portions of the patient's history were reviewed and updated as appropriate: allergies, current medications, past social history and problem list.    Outpatient Medications Marked as Taking for the 7/31/20 encounter (Office Visit) with Arben Wright MD   Medication Sig Dispense Refill   • aspirin 81 MG EC tablet Take 1 tablet by mouth Daily. 30 tablet 0   • atorvastatin (LIPITOR) 20 MG tablet Take 1 tablet by mouth Daily. 30 tablet 11   • Cholecalciferol (VITAMIN D3) 75 MCG (3000 UT) tablet Take 3,000 Units by mouth Daily.     • cycloSPORINE (RESTASIS) 0.05 % ophthalmic emulsion Administer 1 drop to both eyes 2 (Two) Times a Day.     • denosumab (PROLIA) 60 MG/ML solution syringe Inject 60 mg under the skin into the appropriate area as directed Every 6 (Six) Months.     • docusate sodium (COLACE) 100 MG capsule Take 100 mg by mouth Every Night.     • escitalopram (LEXAPRO) 10 MG tablet Take 1 tablet by mouth Daily. 90 tablet 01   • Magnesium 250 MG tablet Take 1 tablet by mouth Every Night.     • metFORMIN ER (GLUCOPHAGE-XR) 500 MG 24 hr tablet Take 1 tablet by mouth Daily With Breakfast. 90 tablet 1   • OneTouch Delica Lancets 33G misc Take 1 each by mouth Daily. 100 each 3   • valACYclovir (Valtrex) 1000 MG tablet Take 2 tablets by mouth 2 (Two) Times a Day. 4 tablet 0   • vitamin B-12 (CYANOCOBALAMIN) 1000 MCG tablet Take 1 tablet by mouth Daily. 30 tablet 5       Review of Systems   Constitutional: Negative for chills and fever.    Cardiovascular: Positive for chest pain.   Musculoskeletal: Positive for back pain.   Skin: Positive for color change.   Neurological: Negative for headaches.       Objective   Vitals:    07/31/20 1130   BP: 108/68   Pulse: 84   Resp: 16   Temp: 97.3 °F (36.3 °C)   SpO2: 98%      Wt Readings from Last 3 Encounters:   07/31/20 51.7 kg (114 lb)   07/20/20 51.3 kg (113 lb)   07/10/20 51 kg (112 lb 6.4 oz)    Body mass index is 19.87 kg/m².      Physical Exam   Constitutional: She appears well-developed and well-nourished.   Pulmonary/Chest: Effort normal and breath sounds normal. No stridor. No respiratory distress. She has no wheezes. She has no rales. She exhibits tenderness ( right postero lateral chest).         Problem List Items Addressed This Visit     None      Visit Diagnoses     Multiple contusions    -  Primary        Assessment/Plan   In with a fall 5 days ago.  Slipped in the kitchen.  Stood up and then started losing her balance and reached for some chairs and tried to block her fall.  She hit the floor.  There is no loss of consciousness.  Hard to say if this was a balance issue or drop in blood pressure.  Irregardless she hit her right forearm area and right posterior rib area.  She is got significant bruising along the entire right forearm.  Has a hematoma proximally.  She is got localized tenderness over the right posterior lateral ribs about the eighth rib.  May easily have a rib fracture.  She is very thin and osteoporotic.  Will get some rib x-rays today.  No further ice needed.  No heat.  Tylenol or Aleve as needed             Dragon disclaimer:   Much of this encounter note is an electronic transcription/translation of spoken language to printed text. The electronic translation of spoken language may permit erroneous, or at times, nonsensical words or phrases to be inadvertently transcribed; Although I have reviewed the note for such errors, some may still exist.

## 2020-08-07 ENCOUNTER — HOSPITAL ENCOUNTER (OUTPATIENT)
Dept: CARDIOLOGY | Facility: HOSPITAL | Age: 85
Discharge: HOME | End: 2020-08-07
Admitting: INTERNAL MEDICINE

## 2020-08-07 PROCEDURE — 0296T HC EXT ECG > 48HR TO 21 DAY RCRD W/CONECT INTL RCRD: CPT

## 2020-08-11 ENCOUNTER — TELEPHONE (OUTPATIENT)
Dept: INTERNAL MEDICINE | Facility: CLINIC | Age: 85
End: 2020-08-11

## 2020-08-11 NOTE — TELEPHONE ENCOUNTER
4000 units daily is okay.  We will check a level next time we see her and see if it is about right.

## 2020-08-12 ENCOUNTER — TELEPHONE (OUTPATIENT)
Dept: NEUROLOGY | Facility: CLINIC | Age: 85
End: 2020-08-12

## 2020-08-12 NOTE — TELEPHONE ENCOUNTER
PT TALKED WITH JORDAN ON 5-20-20 TELEPHONE VISIT AND THE PT WROTE DOWN THAT JORDAN STATED THAT THE PT DIDN'T HAVE TO KEEP THE 1 YR FOLLOW UP IF PT HAD NOTHING GOING ON WITH HER. PLEASE CALL HER BACK [phone] AND LET HER KNOW IF SHE SHOULD KEEP THE 1 YR FOLLOW UP 5-.

## 2020-08-31 PROCEDURE — 0298T HOLTER MONITOR - 72 HOUR UP TO 21 DAY: CPT | Performed by: INTERNAL MEDICINE

## 2020-10-13 ENCOUNTER — TELEPHONE (OUTPATIENT)
Dept: ONCOLOGY | Facility: CLINIC | Age: 85
End: 2020-10-13

## 2020-10-13 ENCOUNTER — OFFICE VISIT (OUTPATIENT)
Dept: INTERNAL MEDICINE | Facility: CLINIC | Age: 85
End: 2020-10-13

## 2020-10-13 ENCOUNTER — TELEPHONE (OUTPATIENT)
Dept: INTERNAL MEDICINE | Facility: CLINIC | Age: 85
End: 2020-10-13

## 2020-10-13 VITALS
SYSTOLIC BLOOD PRESSURE: 140 MMHG | DIASTOLIC BLOOD PRESSURE: 70 MMHG | HEART RATE: 77 BPM | WEIGHT: 114 LBS | OXYGEN SATURATION: 97 % | HEIGHT: 64 IN | TEMPERATURE: 96.6 F | RESPIRATION RATE: 16 BRPM | BODY MASS INDEX: 19.46 KG/M2

## 2020-10-13 DIAGNOSIS — I63.9 CEREBROVASCULAR ACCIDENT (CVA), UNSPECIFIED MECHANISM (HCC): Primary | ICD-10-CM

## 2020-10-13 DIAGNOSIS — B35.1 NAIL FUNGUS: Primary | ICD-10-CM

## 2020-10-13 DIAGNOSIS — M81.0 OSTEOPOROSIS, UNSPECIFIED OSTEOPOROSIS TYPE, UNSPECIFIED PATHOLOGICAL FRACTURE PRESENCE: ICD-10-CM

## 2020-10-13 DIAGNOSIS — E11.9 TYPE 2 DIABETES MELLITUS WITHOUT COMPLICATION, WITHOUT LONG-TERM CURRENT USE OF INSULIN (HCC): ICD-10-CM

## 2020-10-13 DIAGNOSIS — E11.9 CONTROLLED TYPE 2 DIABETES MELLITUS WITHOUT COMPLICATION, WITHOUT LONG-TERM CURRENT USE OF INSULIN (HCC): ICD-10-CM

## 2020-10-13 LAB
CHOLEST SERPL-MCNC: 144 MG/DL (ref 0–200)
CHOLEST/HDLC SERPL: 2.12 {RATIO}
GLUCOSE SERPL-MCNC: 101 MG/DL (ref 65–99)
HBA1C MFR BLD: 6.1 % (ref 4.8–5.6)
HDLC SERPL-MCNC: 68 MG/DL (ref 40–60)
LDLC SERPL CALC-MCNC: 60 MG/DL (ref 0–100)
TRIGL SERPL-MCNC: 86 MG/DL (ref 0–150)
VLDLC SERPL CALC-MCNC: 16 MG/DL (ref 5–40)

## 2020-10-13 PROCEDURE — 99214 OFFICE O/P EST MOD 30 MIN: CPT | Performed by: INTERNAL MEDICINE

## 2020-10-13 PROCEDURE — G0439 PPPS, SUBSEQ VISIT: HCPCS | Performed by: INTERNAL MEDICINE

## 2020-10-13 NOTE — TELEPHONE ENCOUNTER
Caller: MARKUS HOLGUIN    Relationship to patient: SELF    Best call back number: [phone]    PT IS SCHEDULED FOR A PROLIA INJECTION ON 11/23 AT 3PM, PT IS ASKING IF SHE CAN HAVE THIS DONE EARLIER THAT SAME DAY.

## 2020-10-13 NOTE — TELEPHONE ENCOUNTER
For now it is okay for her to take calcium pills since her last calcium level was normal.  As far as podiatry referral, refer to Dr. Whalen.

## 2020-10-13 NOTE — PROGRESS NOTES
Subjective   Cristy Wilde is a 85 y.o. female.     Chief Complaint   Patient presents with   • Diabetes   • Medicare Wellness-subsequent   • Hyperlipidemia   • Cerebrovascular Accident         Diabetes  She presents for her follow-up diabetic visit. She has type 2 diabetes mellitus. Her disease course has been stable. Pertinent negatives for diabetes include no chest pain and no fatigue. Symptoms are stable. Diabetic complications include a CVA. Risk factors for coronary artery disease include diabetes mellitus and dyslipidemia. Her weight is stable. An ACE inhibitor/angiotensin II receptor blocker is not being taken. Eye exam is not current.   Hyperlipidemia  This is a chronic problem. The current episode started more than 1 year ago. The problem is controlled. Pertinent negatives include no chest pain or shortness of breath. Current antihyperlipidemic treatment includes statins. The current treatment provides significant improvement of lipids. There are no compliance problems.    Cerebrovascular Accident  This is a chronic problem. The current episode started more than 1 year ago. The problem occurs constantly. The problem has been unchanged. Pertinent negatives include no abdominal pain, chest pain, chills, coughing, fatigue, fever, nausea or vomiting.   Osteoporosis  This is a chronic problem. The current episode started more than 1 year ago. The problem occurs constantly. The problem has been unchanged. Pertinent negatives include no abdominal pain, chest pain, chills, coughing, fatigue, fever, nausea or vomiting.        The following portions of the patient's history were reviewed and updated as appropriate: allergies, current medications, past social history and problem list.    Outpatient Medications Marked as Taking for the 10/13/20 encounter (Office Visit) with Arben Wright MD   Medication Sig Dispense Refill   • aspirin 81 MG EC tablet Take 1 tablet by mouth Daily. 30 tablet 0   •  atorvastatin (LIPITOR) 20 MG tablet Take 1 tablet by mouth Daily. 30 tablet 11   • Cholecalciferol (VITAMIN D3) 75 MCG (3000 UT) tablet Take 4,000 Units by mouth Daily.     • cycloSPORINE (RESTASIS) 0.05 % ophthalmic emulsion Administer 1 drop to both eyes 2 (Two) Times a Day.     • denosumab (PROLIA) 60 MG/ML solution syringe Inject 60 mg under the skin into the appropriate area as directed Every 6 (Six) Months.     • docusate sodium (COLACE) 100 MG capsule Take 100 mg by mouth Every Night.     • escitalopram (LEXAPRO) 10 MG tablet Take 1 tablet by mouth Daily. 90 tablet 01   • Magnesium 250 MG tablet Take 1 tablet by mouth Every Night.     • metFORMIN ER (GLUCOPHAGE-XR) 500 MG 24 hr tablet Take 1 tablet by mouth Daily With Breakfast. 90 tablet 1   • OneTouch Delica Lancets 33G misc Take 1 each by mouth Daily. 100 each 3   • valACYclovir (Valtrex) 1000 MG tablet Take 2 tablets by mouth 2 (Two) Times a Day. 4 tablet 0   • vitamin B-12 (CYANOCOBALAMIN) 1000 MCG tablet Take 1 tablet by mouth Daily. 30 tablet 5       Review of Systems   Constitutional: Negative for chills, fatigue and fever.   Respiratory: Negative for cough, shortness of breath and wheezing.    Cardiovascular: Negative for chest pain, palpitations and leg swelling.   Gastrointestinal: Positive for constipation. Negative for abdominal pain, diarrhea, nausea and vomiting.       Objective   Vitals:    10/13/20 1321   BP: 140/70   Pulse: 77   Resp: 16   Temp: 96.6 °F (35.9 °C)   SpO2: 97%      Wt Readings from Last 3 Encounters:   10/13/20 51.7 kg (114 lb)   07/31/20 51.7 kg (114 lb)   07/20/20 51.3 kg (113 lb)    Body mass index is 19.87 kg/m².      Physical Exam   Constitutional: She appears well-developed. No distress.   HENT:   Head: Normocephalic and atraumatic.   Neck: Carotid bruit is not present.   Cardiovascular: Normal rate, regular rhythm and normal heart sounds. Exam reveals no gallop.   No murmur heard.  Pulmonary/Chest: Effort normal and  breath sounds normal. No respiratory distress. She has no wheezes. She has no rales.   Abdominal: Soft. Bowel sounds are normal. She exhibits no mass. There is no abdominal tenderness. There is no guarding.         Problems Addressed this Visit        Cardiovascular and Mediastinum    Stroke (CMS/[facility]) - Primary       Endocrine    Controlled type 2 diabetes mellitus without complication, without long-term current use of insulin (CMS/[facility])      Diagnoses       Codes Comments    Cerebrovascular accident (CVA), unspecified mechanism (CMS/[facility])    -  Primary ICD-10-CM: I63.9  ICD-9-CM: 434.91     Controlled type 2 diabetes mellitus without complication, without long-term current use of insulin (CMS/[facility])     ICD-10-CM: E11.9  ICD-9-CM: 250.00         Assessment/Plan   In for follow-up of diabetes and hyperlipidemia.  She had a stroke or TIA in May 2019 and developed diabetes mellitus shortly after that time.  She is having some troubles with memory.  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  Irregardless her medications look good.  She is on a statin and aspirin.  Annual lab work was done December 2019 including CBC, CMP, lipids, A1c.  She will need quarterly lab work including glucose, A1c, and lipids every 3 months.  Gets Prolia shots every 6 months for osteoporosis.  Finances are tight so it may be difficult to get neuropsychometric evaluation.  I suspect developing dementia.  We checked an MMSE at 25 down from 29 and Ryley at 20 down from 22 today.  She is 4.5 HPP today.  TD AP updated today.  Due for Shingrix.  Annual wellness visit today.  Urine microalbumin.  Due for diabetic eye checkup.    PPE today included face mask and eye shield.         Dragon disclaimer:   Much of this encounter note is an electronic transcription/translation of spoken language to printed text. The electronic translation of spoken language  may permit erroneous, or at times, nonsensical words or phrases to be inadvertently transcribed; Although I have reviewed the note for such errors, some may still exist.

## 2020-10-13 NOTE — TELEPHONE ENCOUNTER
1. Pt c/o toe nail splitting in 2. Pt suspects fungal infection. Pt requesting a podiatrist or dermatologist.     2. Also needs to recheck if she should stay of calcium due to previous elevated results.    Please advise.

## 2020-10-13 NOTE — PROGRESS NOTES
The ABCs of the Annual Wellness Visit  Subsequent Medicare Wellness Visit    Chief Complaint   Patient presents with   • Diabetes   • Medicare Wellness-subsequent   • Hyperlipidemia   • Cerebrovascular Accident       Subjective   History of Present Illness:  Cristy Wilde is a 85 y.o. female who presents for a Subsequent Medicare Wellness Visit.    HEALTH RISK ASSESSMENT    Recent Hospitalizations:  No hospitalization(s) within the last year.    Current Medical Providers:  Patient Care Team:  Arben Wright MD as PCP - General (Internal Medicine)  Jerardo Kaur MD as PCP - Family Medicine  Jerardo Kaur MD as PCP - [facility]  Albino Childs MD as Consulting Physician (Ophthalmology)  MAE Davis MD as Consulting Physician (Ophthalmology)    Smoking Status:  Social History     Tobacco Use   Smoking Status Never Smoker   Smokeless Tobacco Never Used       Alcohol Consumption:  Social History     Substance and Sexual Activity   Alcohol Use Not Currently   • Frequency: 4 or more times a week   • Drinks per session: 1 or 2    Comment: quit in 2018       Depression Screen:   PHQ-2/PHQ-9 Depression Screening 10/13/2020   Little interest or pleasure in doing things 0   Feeling down, depressed, or hopeless 3   Trouble falling or staying asleep, or sleeping too much 0   Feeling tired or having little energy 0   Poor appetite or overeating 0   Feeling bad about yourself - or that you are a failure or have let yourself or your family down 0   Trouble concentrating on things, such as reading the newspaper or watching television 0   Moving or speaking so slowly that other people could have noticed. Or the opposite - being so fidgety or restless that you have been moving around a lot more than usual 0   Thoughts that you would be better off dead, or of hurting yourself in some way 0   Total Score 3   If you checked off any problems, how difficult have these problems made it for you to do your work, take  care of things at home, or get along with other people? Not difficult at all       Fall Risk Screen:  HEATHER Fall Risk Assessment was completed, and patient is at MODERATE risk for falls. Assessment completed on:10/13/2020    Health Habits and Functional and Cognitive Screening:  Functional & Cognitive Status 10/13/2020   Do you have difficulty preparing food and eating? No   Do you have difficulty bathing yourself, getting dressed or grooming yourself? No   Do you have difficulty using the toilet? No   Do you have difficulty moving around from place to place? No   Do you have trouble with steps or getting out of a bed or a chair? Yes   Current Diet Well Balanced Diet   Dental Exam Not up to date   Eye Exam Up to date   Exercise (times per week) 2 times per week   Current Exercises Include Walking   Current Exercise Activities Include -   Do you need help using the phone?  No   Are you deaf or do you have serious difficulty hearing?  Yes   Do you need help with transportation? No   Do you need help shopping? No   Do you need help preparing meals?  No   Do you need help with housework?  No   Do you need help with laundry? No   Do you need help taking your medications? No   Do you need help managing money? No   Do you ever drive or ride in a car without wearing a seat belt? No   Have you felt unusual stress, anger or loneliness in the last month? Yes   Who do you live with? Alone   If you need help, do you have trouble finding someone available to you? No   Have you been bothered in the last four weeks by sexual problems? No   Do you have difficulty concentrating, remembering or making decisions? Yes         Does the patient have evidence of cognitive impairment? No    Asprin use counseling:Taking ASA appropriately as indicated    Age-appropriate Screening Schedule:  Refer to the list below for future screening recommendations based on patient's age, sex and/or medical conditions. Orders for these recommended tests  are listed in the plan section. The patient has been provided with a written plan.    Health Maintenance   Topic Date Due   • ZOSTER VACCINE (2 of 3) 02/26/2010   • DXA SCAN  06/08/2020   • INFLUENZA VACCINE  08/01/2020   • HEMOGLOBIN A1C  08/07/2020   • DIABETIC EYE EXAM  08/21/2020   • URINE MICROALBUMIN  09/09/2020   • MAMMOGRAM  05/07/2021   • TDAP/TD VACCINES (2 - Td) 07/10/2030          The following portions of the patient's history were reviewed and updated as appropriate: allergies, current medications, past family history, past medical history, past social history, past surgical history and problem list.    Outpatient Medications Prior to Visit   Medication Sig Dispense Refill   • aspirin 81 MG EC tablet Take 1 tablet by mouth Daily. 30 tablet 0   • atorvastatin (LIPITOR) 20 MG tablet Take 1 tablet by mouth Daily. 30 tablet 11   • Cholecalciferol (VITAMIN D3) 75 MCG (3000 UT) tablet Take 4,000 Units by mouth Daily.     • cycloSPORINE (RESTASIS) 0.05 % ophthalmic emulsion Administer 1 drop to both eyes 2 (Two) Times a Day.     • denosumab (PROLIA) 60 MG/ML solution syringe Inject 60 mg under the skin into the appropriate area as directed Every 6 (Six) Months.     • docusate sodium (COLACE) 100 MG capsule Take 100 mg by mouth Every Night.     • escitalopram (LEXAPRO) 10 MG tablet Take 1 tablet by mouth Daily. 90 tablet 01   • Magnesium 250 MG tablet Take 1 tablet by mouth Every Night.     • metFORMIN ER (GLUCOPHAGE-XR) 500 MG 24 hr tablet Take 1 tablet by mouth Daily With Breakfast. 90 tablet 1   • OneTouch Delica Lancets 33G misc Take 1 each by mouth Daily. 100 each 3   • valACYclovir (Valtrex) 1000 MG tablet Take 2 tablets by mouth 2 (Two) Times a Day. 4 tablet 0   • vitamin B-12 (CYANOCOBALAMIN) 1000 MCG tablet Take 1 tablet by mouth Daily. 30 tablet 5     No facility-administered medications prior to visit.        Patient Active Problem List   Diagnosis   • Chronic rhinitis   • Dry eyes   • Hearing loss  "  • Osteoporosis   • Adjustment disorder with mixed anxiety and depressed mood   • Onychomycosis   • Controlled type 2 diabetes mellitus without complication, without long-term current use of insulin (CMS/[facility])   • Pruritus   • Stress incontinence in female   • Xerosis of skin   • Chronic midline low back pain without sciatica   • Osteoporosis   • TIA (transient ischemic attack)   • Stroke (CMS/[facility])       Advanced Care Planning:  ACP discussion was held with the patient during this visit. Patient has an advance directive (not in EMR), copy requested.    Review of Systems    Compared to one year ago, the patient feels her physical health is better.  Compared to one year ago, the patient feels her mental health is worse.    Reviewed chart for potential of high risk medication in the elderly: yes  Reviewed chart for potential of harmful drug interactions in the elderly:yes    Objective         Vitals:    10/13/20 1321   BP: 140/70   Pulse: 77   Resp: 16   Temp: 96.6 °F (35.9 °C)   TempSrc: Infrared   SpO2: 97%   Weight: 51.7 kg (114 lb)   Height: 161.3 cm (63.5\")   PainSc:   6   PainLoc: Jaw       Body mass index is 19.87 kg/m².  Discussed the patient's BMI with her. The BMI is in the acceptable range.    Physical Exam          Assessment/Plan   Medicare Risks and Personalized Health Plan  CMS Preventative Services Quick Reference  Advance Directive Discussion  Fall Risk  Inactivity/Sedentary    The above risks/problems have been discussed with the patient.  Pertinent information has been shared with the patient in the After Visit Summary.  Follow up plans and orders are seen below in the Assessment/Plan Section.    There are no diagnoses linked to this encounter.  Follow Up:  No follow-ups on file.     An After Visit Summary and PPPS were given to the patient.             "

## 2020-10-14 ENCOUNTER — TELEPHONE (OUTPATIENT)
Dept: INTERNAL MEDICINE | Facility: CLINIC | Age: 85
End: 2020-10-14

## 2020-10-14 NOTE — TELEPHONE ENCOUNTER
Returned pt call, l/m she needs to contact DR Wright's office to r/s prolia shot since its a ref. Pt.

## 2020-10-16 ENCOUNTER — TELEPHONE (OUTPATIENT)
Dept: NEUROLOGY | Facility: CLINIC | Age: 85
End: 2020-10-16

## 2020-10-16 NOTE — TELEPHONE ENCOUNTER
PT CALLED REGARDING HER STROKE SHE HAD. PT SAID SHE WAS TOLD SHE HAD A TIA BUT SHE STATES DR. ADOLFO MENA SAID PT HAD A DIFFERENT KIND OF STROKE AND PT IS WANTING TO KNOW EXACTLY WHAT KIND OF STROKE SHE HAD. PLEASE REVIEW AND ADVISE      CALL BACK - [phone]

## 2020-10-19 NOTE — TELEPHONE ENCOUNTER
Spoke with patient.  Reviewed records from hospital stay that stated that she had a small acute infarction within the upper left cerebellum.

## 2020-10-22 ENCOUNTER — TELEPHONE (OUTPATIENT)
Dept: INTERNAL MEDICINE | Facility: CLINIC | Age: 85
End: 2020-10-22

## 2020-10-22 ENCOUNTER — TELEPHONE (OUTPATIENT)
Dept: NEUROLOGY | Facility: CLINIC | Age: 85
End: 2020-10-22

## 2020-10-22 NOTE — TELEPHONE ENCOUNTER
Pt has questions about her after visit summary from her recent visit. Pt has  questions about Valtrex and wants info on what pathological fracture presence means. Please advise.

## 2020-10-22 NOTE — TELEPHONE ENCOUNTER
PATIENT CALLED, ASKED IF SHE COULD GET INFORMATION REGARDING THE STROKE SHE HAD IN WRITING.  SHE HAD RECEIVED THE INFORMATION VIA PHONE, BUT WOULD LIKE TO HAVE A WRITTEN CONFIRMATION BOTH FOR HER RECORDS AND SO SHE CAN SHARE WITH OTHER PROVIDERS.    MAIL TO:  [address] [place] [address]    BEST CALL BACK: [phone]; LEAVE VM IF NO ANSWER

## 2020-11-06 RX ORDER — BLOOD SUGAR DIAGNOSTIC
STRIP MISCELLANEOUS
Refills: 11 | OUTPATIENT
Start: 2020-11-06

## 2020-11-13 RX ORDER — BLOOD SUGAR DIAGNOSTIC
STRIP MISCELLANEOUS
Qty: 100 EACH | Refills: 11 | Status: SHIPPED | OUTPATIENT
Start: 2020-11-13 | End: 2020-12-02

## 2020-11-17 DIAGNOSIS — E11.9 CONTROLLED TYPE 2 DIABETES MELLITUS WITHOUT COMPLICATION, WITHOUT LONG-TERM CURRENT USE OF INSULIN (HCC): ICD-10-CM

## 2020-11-17 RX ORDER — LANCETS 33 GAUGE
1 EACH MISCELLANEOUS DAILY
Qty: 100 EACH | Refills: 3 | Status: SHIPPED | OUTPATIENT
Start: 2020-11-17 | End: 2022-07-12 | Stop reason: SDUPTHER

## 2020-11-17 RX ORDER — LANCING DEVICE/LANCETS
1 KIT MISCELLANEOUS DAILY
Qty: 1 EACH | Refills: 1 | Status: SHIPPED | OUTPATIENT
Start: 2020-11-17

## 2020-11-20 RX ORDER — LANCING DEVICE/LANCETS
1 KIT MISCELLANEOUS DAILY
Qty: 1 EACH | Refills: 11 | Status: SHIPPED | OUTPATIENT
Start: 2020-11-20

## 2020-11-24 ENCOUNTER — INFUSION (OUTPATIENT)
Dept: ONCOLOGY | Facility: HOSPITAL | Age: 85
End: 2020-11-24

## 2020-11-24 ENCOUNTER — TELEPHONE (OUTPATIENT)
Dept: ONCOLOGY | Facility: CLINIC | Age: 85
End: 2020-11-24

## 2020-11-24 ENCOUNTER — LAB (OUTPATIENT)
Dept: OTHER | Facility: HOSPITAL | Age: 85
End: 2020-11-24

## 2020-11-24 VITALS — BODY MASS INDEX: 19.09 KG/M2 | HEIGHT: 64 IN | TEMPERATURE: 98 F | RESPIRATION RATE: 18 BRPM | WEIGHT: 111.8 LBS

## 2020-11-24 DIAGNOSIS — M80.00XA AGE-RELATED OSTEOPOROSIS WITH CURRENT PATHOLOGICAL FRACTURE, INITIAL ENCOUNTER: Primary | ICD-10-CM

## 2020-11-24 LAB
CALCIUM SPEC-SCNC: 10.1 MG/DL (ref 8.6–10.5)
MAGNESIUM SERPL-MCNC: 2.1 MG/DL (ref 1.6–2.4)
PHOSPHATE SERPL-MCNC: 3.8 MG/DL (ref 2.5–4.5)

## 2020-11-24 PROCEDURE — 84100 ASSAY OF PHOSPHORUS: CPT | Performed by: INTERNAL MEDICINE

## 2020-11-24 PROCEDURE — 96372 THER/PROPH/DIAG INJ SC/IM: CPT

## 2020-11-24 PROCEDURE — 36415 COLL VENOUS BLD VENIPUNCTURE: CPT | Performed by: INTERNAL MEDICINE

## 2020-11-24 PROCEDURE — 25010000002 DENOSUMAB 60 MG/ML SOLUTION PREFILLED SYRINGE: Performed by: INTERNAL MEDICINE

## 2020-11-24 PROCEDURE — 82310 ASSAY OF CALCIUM: CPT | Performed by: INTERNAL MEDICINE

## 2020-11-24 PROCEDURE — 83735 ASSAY OF MAGNESIUM: CPT | Performed by: INTERNAL MEDICINE

## 2020-11-24 RX ADMIN — DENOSUMAB 60 MG: 60 INJECTION SUBCUTANEOUS at 14:51

## 2020-11-24 NOTE — TELEPHONE ENCOUNTER
----- Message from Araceli Wilson RN sent at 11/24/2020  8:11 AM EST -----  Regarding: Add patient to lab schedule for Prolia injection  Pt will need labs prior to Prolia injection today at [facility]. Please add to lab schedule at 1:30 today at [facility] and call patient to let her know at [phone]. Thank you!

## 2020-11-24 NOTE — NURSING NOTE
Arrived for prolia injection. Indication and side effects reviewed. Denies recent dental work. Labs and medications verified. Prolia administered in R arm without incidence. Instructed to call prescribing MD for any concerns or questions and instructed on how to schedule future appts.  Pt vu and discharged ambulatory.

## 2020-12-02 RX ORDER — BLOOD SUGAR DIAGNOSTIC
STRIP MISCELLANEOUS
Qty: 100 EACH | Refills: 11 | Status: SHIPPED | OUTPATIENT
Start: 2020-12-02 | End: 2020-12-07 | Stop reason: SDUPTHER

## 2020-12-07 RX ORDER — BLOOD SUGAR DIAGNOSTIC
STRIP MISCELLANEOUS
Refills: 11 | OUTPATIENT
Start: 2020-12-07

## 2020-12-07 RX ORDER — BLOOD SUGAR DIAGNOSTIC
1 STRIP MISCELLANEOUS DAILY
Qty: 100 EACH | Refills: 11 | Status: SHIPPED | OUTPATIENT
Start: 2020-12-07 | End: 2022-01-05

## 2020-12-16 ENCOUNTER — TELEPHONE (OUTPATIENT)
Dept: INTERNAL MEDICINE | Facility: CLINIC | Age: 85
End: 2020-12-16

## 2020-12-16 NOTE — TELEPHONE ENCOUNTER
Pt's daughter (she is on hippa form) called to report that she thinks she is having vertigo and has been struggling with this for a little while now. Pt's daughter is in [place] at this time, but says that pt needs to get into an ENT or a PT for evaluation and requests a referral for this. Please advise.

## 2020-12-18 ENCOUNTER — TELEPHONE (OUTPATIENT)
Dept: INTERNAL MEDICINE | Facility: CLINIC | Age: 85
End: 2020-12-18

## 2020-12-18 DIAGNOSIS — R42 VERTIGO: Primary | ICD-10-CM

## 2020-12-18 NOTE — TELEPHONE ENCOUNTER
Pt requests referral for PT to [facility] due to vertigo. Fax # [phone]

## 2020-12-21 NOTE — TELEPHONE ENCOUNTER
Spoke w/ patient. She has already been seen by [facility], they just need a faxed referral for their records. Faxed referral today.

## 2021-01-15 ENCOUNTER — OFFICE VISIT (OUTPATIENT)
Dept: INTERNAL MEDICINE | Facility: CLINIC | Age: 86
End: 2021-01-15

## 2021-01-15 VITALS
BODY MASS INDEX: 19.12 KG/M2 | DIASTOLIC BLOOD PRESSURE: 66 MMHG | TEMPERATURE: 96.9 F | HEART RATE: 76 BPM | HEIGHT: 64 IN | RESPIRATION RATE: 16 BRPM | WEIGHT: 112 LBS | SYSTOLIC BLOOD PRESSURE: 120 MMHG | OXYGEN SATURATION: 99 %

## 2021-01-15 DIAGNOSIS — E11.9 CONTROLLED TYPE 2 DIABETES MELLITUS WITHOUT COMPLICATION, WITHOUT LONG-TERM CURRENT USE OF INSULIN (HCC): Primary | ICD-10-CM

## 2021-01-15 DIAGNOSIS — I63.9 CEREBROVASCULAR ACCIDENT (CVA), UNSPECIFIED MECHANISM (HCC): Chronic | ICD-10-CM

## 2021-01-15 DIAGNOSIS — M81.0 OSTEOPOROSIS, UNSPECIFIED OSTEOPOROSIS TYPE, UNSPECIFIED PATHOLOGICAL FRACTURE PRESENCE: Chronic | ICD-10-CM

## 2021-01-15 DIAGNOSIS — Z79.899 MEDICATION MANAGEMENT: ICD-10-CM

## 2021-01-15 DIAGNOSIS — R10.2 VAGINAL PAIN: ICD-10-CM

## 2021-01-15 PROBLEM — N39.3 STRESS INCONTINENCE IN FEMALE: Chronic | Status: ACTIVE | Noted: 2017-03-13

## 2021-01-15 LAB
POC CREATININE URINE: 50
POC MICROALBUMIN URINE: 10

## 2021-01-15 PROCEDURE — 82044 UR ALBUMIN SEMIQUANTITATIVE: CPT | Performed by: INTERNAL MEDICINE

## 2021-01-15 PROCEDURE — 99214 OFFICE O/P EST MOD 30 MIN: CPT | Performed by: INTERNAL MEDICINE

## 2021-01-15 NOTE — PROGRESS NOTES
Subjective   Cristy Wilde is a 86 y.o. female.     Chief Complaint   Patient presents with   • Dementia   • Diabetes   • Hyperlipidemia   • Stroke         Dementia  Pertinent negatives include no abdominal pain, chest pain, coughing or headaches.   Diabetes  She presents for her follow-up diabetic visit. She has type 2 diabetes mellitus. Her disease course has been stable. Hypoglycemia symptoms include dizziness. Pertinent negatives for hypoglycemia include no headaches. Pertinent negatives for diabetes include no chest pain. Symptoms are stable. Diabetic complications include a CVA. Risk factors for coronary artery disease include diabetes mellitus and dyslipidemia. Her weight is stable. An ACE inhibitor/angiotensin II receptor blocker is not being taken. Eye exam is not current.   Hyperlipidemia  This is a chronic problem. The current episode started more than 1 year ago. The problem is controlled. Pertinent negatives include no chest pain or shortness of breath. Current antihyperlipidemic treatment includes statins. The current treatment provides significant improvement of lipids. There are no compliance problems.    Stroke  Pertinent negatives include no abdominal pain, chest pain, coughing or headaches.   Cerebrovascular Accident  This is a chronic problem. The current episode started more than 1 year ago. The problem occurs constantly. The problem has been unchanged. Pertinent negatives include no abdominal pain, chest pain, coughing or headaches.   Osteoporosis  This is a chronic problem. The current episode started more than 1 year ago. The problem occurs constantly. The problem has been unchanged. Pertinent negatives include no abdominal pain, chest pain, coughing or headaches.        The following portions of the patient's history were reviewed and updated as appropriate: allergies, current medications, past social history and problem list.    Outpatient Medications Marked as Taking for the  1/15/21 encounter (Office Visit) with Arben Wright MD   Medication Sig Dispense Refill   • aspirin 81 MG EC tablet Take 1 tablet by mouth Daily. 30 tablet 0   • Cholecalciferol (VITAMIN D3) 75 MCG (3000 UT) tablet Take 4,000 Units by mouth Daily.     • cycloSPORINE (RESTASIS) 0.05 % ophthalmic emulsion Administer 1 drop to both eyes 2 (Two) Times a Day.     • denosumab (PROLIA) 60 MG/ML solution syringe Inject 60 mg under the skin into the appropriate area as directed Every 6 (Six) Months.     • docusate sodium (COLACE) 100 MG capsule Take 200 mg by mouth Every Night.     • escitalopram (LEXAPRO) 10 MG tablet Take 1 tablet by mouth Daily. 90 tablet 01   • glucose blood (OneTouch Ultra) test strip 1 each by Other route Daily. DX: E11.9 100 each 11   • Lancet Devices (ONE TOUCH DELICA LANCING DEV) misc 1 each Daily. DX: E11.9 TYPE 2 DM 1 each 1   • Lancet Devices (OneTouch Delica Plus Lancing) misc 1 each Daily. USE ONETOUCH DELICA PLUS LANCING DEVICE ONCE DAILY FOR GLUCOSE MONITOR  DX: E11.9 1 each 11   • Magnesium 250 MG tablet Take 1 tablet by mouth Every Night.     • metFORMIN ER (GLUCOPHAGE-XR) 500 MG 24 hr tablet Take 1 tablet by mouth Daily With Breakfast. 90 tablet 1   • OneTouch Delica Lancets 33G misc Take 1 each by mouth Daily. DX: E11.9 TYPE 2  each 3   • vitamin B-12 (CYANOCOBALAMIN) 1000 MCG tablet Take 1 tablet by mouth Daily. 30 tablet 5       Review of Systems   Respiratory: Negative for cough, shortness of breath and wheezing.    Cardiovascular: Negative for chest pain, palpitations and leg swelling.   Gastrointestinal: Positive for constipation ( controlled with stool softener). Negative for abdominal pain and diarrhea.   Neurological: Positive for dizziness. Negative for headaches.       Objective   Vitals:    01/15/21 1105   BP: 120/66   Pulse: 76   Resp: 16   Temp: 96.9 °F (36.1 °C)   SpO2: 99%      Wt Readings from Last 3 Encounters:   01/15/21 50.8 kg (112 lb)   11/24/20 50.7 kg (111  lb 12.8 oz)   10/13/20 51.7 kg (114 lb)    Body mass index is 19.22 kg/m².      Physical Exam   Constitutional: She appears well-developed.   Cardiovascular: Normal rate, regular rhythm and normal heart sounds. Exam reveals no gallop.   No murmur heard.  Pulmonary/Chest: Effort normal and breath sounds normal. No respiratory distress. She has no wheezes. She has no rales.   Abdominal: Soft. Normal appearance and bowel sounds are normal. She exhibits no mass. There is no abdominal tenderness. There is no guarding.   Neurological: She is alert.         Problems Addressed this Visit        Endocrine and Metabolic    Controlled type 2 diabetes mellitus without complication, without long-term current use of insulin (CMS/[facility]) - Primary (Chronic)       Musculoskeletal and Injuries    Osteoporosis (Chronic)       Neuro    Stroke (CMS/[facility]) (Chronic)      Diagnoses       Codes Comments    Controlled type 2 diabetes mellitus without complication, without long-term current use of insulin (CMS/[facility])    -  Primary ICD-10-CM: E11.9  ICD-9-CM: 250.00     Osteoporosis, unspecified osteoporosis type, unspecified pathological fracture presence     ICD-10-CM: M81.0  ICD-9-CM: 733.00     Cerebrovascular accident (CVA), unspecified mechanism (CMS/[facility])     ICD-10-CM: I63.9  ICD-9-CM: 434.91         Assessment/Plan   In for follow-up of diabetes and hyperlipidemia.  She had a stroke in May 2019 and developed diabetes mellitus shortly after that time.  She is having some troubles with memory.  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  Irregardless her medications look good.  She is on a statin and aspirin.  Annual lab work due today January 2021 including CBC, CMP, lipids, A1c.  She will need quarterly lab work including glucose, A1c, and lipids every 3 months.  Gets Prolia shots every 6 months for osteoporosis.  Finances are tight so it may  be difficult to get neuropsychometric evaluation.  I suspect developing dementia.  We checked an MMSE at 29 up from 25 down from 29 and Ryley at 23 up from 20 down from 22 today.  She is fasting today.  Due for Shingrix.  Urine microalbumin.  Recent diabetic eye checkup.  She complains of a vaginal sore and would like a GYN checkup.  We will arrange for that.    PPE today included face mask and eye shield.         Dragon disclaimer:   Much of this encounter note is an electronic transcription/translation of spoken language to printed text. The electronic translation of spoken language may permit erroneous, or at times, nonsensical words or phrases to be inadvertently transcribed; Although I have reviewed the note for such errors, some may still exist.

## 2021-01-16 LAB
ALBUMIN SERPL-MCNC: 4.3 G/DL (ref 3.5–5.2)
ALBUMIN/GLOB SERPL: 1.9 G/DL
ALP SERPL-CCNC: 66 U/L (ref 39–117)
ALT SERPL-CCNC: 14 U/L (ref 1–33)
AST SERPL-CCNC: 20 U/L (ref 1–32)
BASOPHILS # BLD AUTO: 0.06 10*3/MM3 (ref 0–0.2)
BASOPHILS NFR BLD AUTO: 1 % (ref 0–1.5)
BILIRUB SERPL-MCNC: 0.5 MG/DL (ref 0–1.2)
BUN SERPL-MCNC: 23 MG/DL (ref 8–23)
BUN/CREAT SERPL: 31.5 (ref 7–25)
CALCIUM SERPL-MCNC: 9.6 MG/DL (ref 8.6–10.5)
CHLORIDE SERPL-SCNC: 102 MMOL/L (ref 98–107)
CHOLEST SERPL-MCNC: 144 MG/DL (ref 0–200)
CHOLEST/HDLC SERPL: 2.09 {RATIO}
CO2 SERPL-SCNC: 27.7 MMOL/L (ref 22–29)
CREAT SERPL-MCNC: 0.73 MG/DL (ref 0.57–1)
EOSINOPHIL # BLD AUTO: 0.18 10*3/MM3 (ref 0–0.4)
EOSINOPHIL NFR BLD AUTO: 3 % (ref 0.3–6.2)
ERYTHROCYTE [DISTWIDTH] IN BLOOD BY AUTOMATED COUNT: 13 % (ref 12.3–15.4)
GLOBULIN SER CALC-MCNC: 2.3 GM/DL
GLUCOSE SERPL-MCNC: 124 MG/DL (ref 65–99)
HBA1C MFR BLD: 6.3 % (ref 4.8–5.6)
HCT VFR BLD AUTO: 41.4 % (ref 34–46.6)
HDLC SERPL-MCNC: 69 MG/DL (ref 40–60)
HGB BLD-MCNC: 14.1 G/DL (ref 12–15.9)
IMM GRANULOCYTES # BLD AUTO: 0.02 10*3/MM3 (ref 0–0.05)
IMM GRANULOCYTES NFR BLD AUTO: 0.3 % (ref 0–0.5)
LDLC SERPL CALC-MCNC: 62 MG/DL (ref 0–100)
LYMPHOCYTES # BLD AUTO: 1.03 10*3/MM3 (ref 0.7–3.1)
LYMPHOCYTES NFR BLD AUTO: 16.9 % (ref 19.6–45.3)
MCH RBC QN AUTO: 30.5 PG (ref 26.6–33)
MCHC RBC AUTO-ENTMCNC: 34.1 G/DL (ref 31.5–35.7)
MCV RBC AUTO: 89.6 FL (ref 79–97)
MONOCYTES # BLD AUTO: 0.57 10*3/MM3 (ref 0.1–0.9)
MONOCYTES NFR BLD AUTO: 9.4 % (ref 5–12)
NEUTROPHILS # BLD AUTO: 4.23 10*3/MM3 (ref 1.7–7)
NEUTROPHILS NFR BLD AUTO: 69.4 % (ref 42.7–76)
NRBC BLD AUTO-RTO: 0 /100 WBC (ref 0–0.2)
PLATELET # BLD AUTO: 213 10*3/MM3 (ref 140–450)
POTASSIUM SERPL-SCNC: 4.1 MMOL/L (ref 3.5–5.2)
PROT SERPL-MCNC: 6.6 G/DL (ref 6–8.5)
RBC # BLD AUTO: 4.62 10*6/MM3 (ref 3.77–5.28)
SODIUM SERPL-SCNC: 138 MMOL/L (ref 136–145)
TRIGL SERPL-MCNC: 61 MG/DL (ref 0–150)
VLDLC SERPL CALC-MCNC: 13 MG/DL (ref 5–40)
WBC # BLD AUTO: 6.09 10*3/MM3 (ref 3.4–10.8)

## 2021-01-27 ENCOUNTER — TELEPHONE (OUTPATIENT)
Dept: INTERNAL MEDICINE | Facility: CLINIC | Age: 86
End: 2021-01-27

## 2021-01-27 NOTE — TELEPHONE ENCOUNTER
I see no advantage to vitamin C.  We will just increase her pill burden.  Taking the Metformin with supper is fine.

## 2021-01-27 NOTE — TELEPHONE ENCOUNTER
PATIENT IS CALLING AND WOULD LIKE TO KNOW IF SHE SHOULD BE TAKING VITAMIN C AND IF SO HOW MANY MG SHOULD SHE BE TAKING. ON HER LIST OF MEDICATION HER METFORMIN STATES THAT SHE SHOULD TAKE IT IN THE MORNING, SHE WOULD LIKE TO KNOW IF SHE CAN TAKE IT WITH FOOD AND STATES IF SHE TAKES IT AT NIGHT HER BLOOD SUGAR IS LOWER IN THE MORNING    PLEASE ADVISE      [phone]

## 2021-01-27 NOTE — TELEPHONE ENCOUNTER
Please advise if patient would benefit from Vitamin C. Also, is it ok for her to take Metformin in the evening with dinner. Please advise.

## 2021-02-12 DIAGNOSIS — I63.9 CEREBROVASCULAR ACCIDENT (CVA), UNSPECIFIED MECHANISM (HCC): ICD-10-CM

## 2021-02-12 DIAGNOSIS — Z79.899 ON STATIN THERAPY: ICD-10-CM

## 2021-02-12 RX ORDER — ESCITALOPRAM OXALATE 10 MG/1
TABLET ORAL
Qty: 90 TABLET | Refills: 1 | Status: SHIPPED | OUTPATIENT
Start: 2021-02-12 | End: 2021-09-27

## 2021-02-12 NOTE — TELEPHONE ENCOUNTER
PT CALLED TO REQUEST THAT WE GET THESE SENT THROUGH TODAY, SO SHE CAN PICK THEM UP BEFORE THE WEATHER GETS WORSE, AS SHE SAYS SHE DOES NOT HAVE ENOUGH TO WAIT UNTIL AFTER THE WINTER STORMS CLEAR UP.

## 2021-02-15 RX ORDER — ATORVASTATIN CALCIUM 20 MG/1
TABLET, FILM COATED ORAL
Qty: 90 TABLET | Refills: 6 | Status: SHIPPED | OUTPATIENT
Start: 2021-02-15 | End: 2022-05-17 | Stop reason: SDUPTHER

## 2021-03-25 RX ORDER — METFORMIN HYDROCHLORIDE 500 MG/1
TABLET, EXTENDED RELEASE ORAL
Qty: 90 TABLET | Refills: 1 | Status: SHIPPED | OUTPATIENT
Start: 2021-03-25 | End: 2021-12-09

## 2021-04-19 ENCOUNTER — OFFICE VISIT (OUTPATIENT)
Dept: INTERNAL MEDICINE | Facility: CLINIC | Age: 86
End: 2021-04-19

## 2021-04-19 VITALS
HEART RATE: 66 BPM | OXYGEN SATURATION: 97 % | WEIGHT: 116 LBS | DIASTOLIC BLOOD PRESSURE: 68 MMHG | RESPIRATION RATE: 16 BRPM | SYSTOLIC BLOOD PRESSURE: 116 MMHG | HEIGHT: 64 IN | TEMPERATURE: 97.1 F | BODY MASS INDEX: 19.81 KG/M2

## 2021-04-19 DIAGNOSIS — I63.9 CEREBROVASCULAR ACCIDENT (CVA), UNSPECIFIED MECHANISM (HCC): Chronic | ICD-10-CM

## 2021-04-19 DIAGNOSIS — F09 COGNITIVE DYSFUNCTION: ICD-10-CM

## 2021-04-19 DIAGNOSIS — M81.0 OSTEOPOROSIS, UNSPECIFIED OSTEOPOROSIS TYPE, UNSPECIFIED PATHOLOGICAL FRACTURE PRESENCE: Chronic | ICD-10-CM

## 2021-04-19 DIAGNOSIS — E11.9 CONTROLLED TYPE 2 DIABETES MELLITUS WITHOUT COMPLICATION, WITHOUT LONG-TERM CURRENT USE OF INSULIN (HCC): Primary | Chronic | ICD-10-CM

## 2021-04-19 PROCEDURE — 99214 OFFICE O/P EST MOD 30 MIN: CPT | Performed by: INTERNAL MEDICINE

## 2021-04-19 NOTE — PROGRESS NOTES
Subjective   Cristy Wilde is a 86 y.o. female.     Chief Complaint   Patient presents with   • Follow-up     Stroke, DM          Dementia    Diabetes  She presents for her follow-up diabetic visit. She has type 2 diabetes mellitus. Her disease course has been stable. Symptoms are stable. Risk factors for coronary artery disease include diabetes mellitus and dyslipidemia. Her weight is stable. An ACE inhibitor/angiotensin II receptor blocker is not being taken. Eye exam is not current.   Hyperlipidemia  This is a chronic problem. The current episode started more than 1 year ago. The problem is controlled. Pertinent negatives include no shortness of breath. Current antihyperlipidemic treatment includes statins. The current treatment provides significant improvement of lipids. There are no compliance problems.    Stroke  This is a chronic problem. The current episode started more than 1 year ago. The problem occurs constantly. The problem has been unchanged.   Osteoporosis  This is a chronic problem. The current episode started more than 1 year ago. The problem occurs constantly. The problem has been unchanged.        The following portions of the patient's history were reviewed and updated as appropriate: allergies, current medications, past social history and problem list.    Outpatient Medications Marked as Taking for the 4/19/21 encounter (Office Visit) with Arben Wright MD   Medication Sig Dispense Refill   • aspirin 81 MG EC tablet Take 1 tablet by mouth Daily. 30 tablet 0   • atorvastatin (LIPITOR) 20 MG tablet TAKE ONE TABLET BY MOUTH DAILY 90 tablet 6   • Cholecalciferol (VITAMIN D3) 75 MCG (3000 UT) tablet Take 4,000 Units by mouth Daily.     • cycloSPORINE (RESTASIS) 0.05 % ophthalmic emulsion Administer 1 drop to both eyes 2 (Two) Times a Day.     • denosumab (PROLIA) 60 MG/ML solution syringe Inject 60 mg under the skin into the appropriate area as directed Every 6 (Six) Months.     •  docusate sodium (COLACE) 100 MG capsule Take 200 mg by mouth Every Night.     • escitalopram (LEXAPRO) 10 MG tablet TAKE ONE TABLET BY MOUTH DAILY 90 tablet 1   • glucose blood (OneTouch Ultra) test strip 1 each by Other route Daily. DX: E11.9 100 each 11   • Lancet Devices (ONE TOUCH DELICA LANCING DEV) misc 1 each Daily. DX: E11.9 TYPE 2 DM 1 each 1   • Lancet Devices (OneTouch Delica Plus Lancing) misc 1 each Daily. USE ONETOUCH DELICA PLUS LANCING DEVICE ONCE DAILY FOR GLUCOSE MONITOR  DX: E11.9 1 each 11   • Magnesium 250 MG tablet Take 1 tablet by mouth Every Night.     • metFORMIN ER (GLUCOPHAGE-XR) 500 MG 24 hr tablet TAKE ONE TABLET BY MOUTH DAILY WITH BREAKFAST 90 tablet 1   • OneTouch Delica Lancets 33G misc Take 1 each by mouth Daily. DX: E11.9 TYPE 2  each 3   • vitamin B-12 (CYANOCOBALAMIN) 1000 MCG tablet Take 1 tablet by mouth Daily. 30 tablet 5       Review of Systems   Respiratory: Negative for shortness of breath and wheezing.    Cardiovascular: Negative for palpitations and leg swelling.   Gastrointestinal: Positive for constipation ( controlled with stool softener). Negative for diarrhea.       Objective   Vitals:    04/19/21 1014   BP: 116/68   Pulse: 66   Resp: 16   Temp: 97.1 °F (36.2 °C)   SpO2: 97%      Wt Readings from Last 3 Encounters:   04/19/21 52.6 kg (116 lb)   01/15/21 50.8 kg (112 lb)   11/24/20 50.7 kg (111 lb 12.8 oz)    Body mass index is 19.91 kg/m².      Physical Exam   Constitutional: She appears well-developed.   Cardiovascular: Normal rate, regular rhythm and normal heart sounds. Exam reveals no gallop.   No murmur heard.  Pulmonary/Chest: Effort normal and breath sounds normal. No respiratory distress. She has no wheezes. She has no rales.   Abdominal: Soft. Normal appearance and bowel sounds are normal. She exhibits no mass. There is no abdominal tenderness. There is no guarding.   Neurological: She is alert.         Problems Addressed this Visit        Endocrine  and Metabolic    Controlled type 2 diabetes mellitus without complication, without long-term current use of insulin (CMS/[facility]) - Primary (Chronic)       Musculoskeletal and Injuries    Osteoporosis (Chronic)       Neuro    Stroke (CMS/[facility]) (Chronic)      Diagnoses       Codes Comments    Controlled type 2 diabetes mellitus without complication, without long-term current use of insulin (CMS/[facility])    -  Primary ICD-10-CM: E11.9  ICD-9-CM: 250.00     Cerebrovascular accident (CVA), unspecified mechanism (CMS/[facility])     ICD-10-CM: I63.9  ICD-9-CM: 434.91     Osteoporosis, unspecified osteoporosis type, unspecified pathological fracture presence     ICD-10-CM: M81.0  ICD-9-CM: 733.00         Assessment/Plan   In for follow-up of diabetes and hyperlipidemia.  She had a stroke in May 2019 and developed diabetes mellitus shortly after that time.  She is having some troubles with memory.  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  Irregardless her medications look good.  She is on a statin and aspirin.  Annual lab work due January 2021 including CBC, CMP, lipids, A1c.  She will need quarterly lab work including glucose, A1c, and lipids every 3 months.  Gets Prolia shots every 6 months for osteoporosis.  Finances are tight so it may be difficult to get neuropsychometric evaluation.  I suspect developing dementia.  We checked an MMSE at 28 down from 29 up from 25 down from 29 and Ryley at 26 up from 23 up from 20 down from 22 today.  She is fasting today.  Due for Shingrix.  Urine microalbumin.  Recent diabetic eye checkup.      The above information was reviewed again today 04/19/21.  It continues to be accurate as reflected above and is unchanged.  History, physical and review of systems all reviewed and are unchanged.  Medications were reviewed today and continue the current dosing.    PPE today includes face mask and eye  shield.         Dragon disclaimer:   Much of this encounter note is an electronic transcription/translation of spoken language to printed text. The electronic translation of spoken language may permit erroneous, or at times, nonsensical words or phrases to be inadvertently transcribed; Although I have reviewed the note for such errors, some may still exist.

## 2021-04-20 LAB
CHOLEST SERPL-MCNC: 155 MG/DL (ref 0–200)
CHOLEST/HDLC SERPL: 2.21 {RATIO}
GLUCOSE SERPL-MCNC: 121 MG/DL (ref 65–99)
HBA1C MFR BLD: 6.2 % (ref 4.8–5.6)
HDLC SERPL-MCNC: 70 MG/DL (ref 40–60)
LDLC SERPL CALC-MCNC: 71 MG/DL (ref 0–100)
TRIGL SERPL-MCNC: 71 MG/DL (ref 0–150)
VLDLC SERPL CALC-MCNC: 14 MG/DL (ref 5–40)

## 2021-05-11 ENCOUNTER — TELEPHONE (OUTPATIENT)
Dept: INTERNAL MEDICINE | Facility: CLINIC | Age: 86
End: 2021-05-11

## 2021-05-11 NOTE — TELEPHONE ENCOUNTER
PATIENT STATES THAT SHE IS LOOSING HER HAIR. SHE WAS INFORMED BY HER BEAUTICIAN TO TRY BIOTIN. PATIENT IS CONCERNED THAT THE MEDICATION MIGHT INTERFERE WITH HER CURRENT MEDICATIONS.  PLEASE ADVISE    CALL BACK #: [phone]

## 2021-05-22 DIAGNOSIS — M80.00XA AGE-RELATED OSTEOPOROSIS WITH CURRENT PATHOLOGICAL FRACTURE, INITIAL ENCOUNTER: Primary | ICD-10-CM

## 2021-05-24 ENCOUNTER — TELEPHONE (OUTPATIENT)
Dept: INTERNAL MEDICINE | Facility: CLINIC | Age: 86
End: 2021-05-24

## 2021-05-24 NOTE — TELEPHONE ENCOUNTER
Caller: Cristy Wilde    Relationship to patient: Self    Best call back number: [phone]    Patient is needing: PATIENT WOULD LIKE TO KNOW IF SHE SHOULD KEEP THE APPOINTMENT THAT SHE HAS WITH DR. BRIGGS TOMORROW. PLEASE REACH OUT TO PATIENT AND ADVISE

## 2021-05-24 NOTE — TELEPHONE ENCOUNTER
Patient was last seen 4/19/21. Your note states she had a stroke May 2019. She is having some troubles with memory. Finances are tight, so may be difficult to get neuro psych eval. Patient has an appointment with Neurology nurse practicioner, Nilsa Aguilera tomorrow. Patient says you told her if she didn't want to keep the appointment, that would be ok. Please advise.

## 2021-05-25 ENCOUNTER — OFFICE VISIT (OUTPATIENT)
Dept: NEUROLOGY | Facility: CLINIC | Age: 86
End: 2021-05-25

## 2021-05-25 VITALS
WEIGHT: 114 LBS | HEART RATE: 76 BPM | BODY MASS INDEX: 19.46 KG/M2 | OXYGEN SATURATION: 98 % | DIASTOLIC BLOOD PRESSURE: 50 MMHG | HEIGHT: 64 IN | SYSTOLIC BLOOD PRESSURE: 100 MMHG

## 2021-05-25 DIAGNOSIS — E11.9 CONTROLLED TYPE 2 DIABETES MELLITUS WITHOUT COMPLICATION, WITHOUT LONG-TERM CURRENT USE OF INSULIN (HCC): ICD-10-CM

## 2021-05-25 DIAGNOSIS — I63.9 CEREBROVASCULAR ACCIDENT (CVA), UNSPECIFIED MECHANISM (HCC): Primary | ICD-10-CM

## 2021-05-25 DIAGNOSIS — F43.23 ADJUSTMENT DISORDER WITH MIXED ANXIETY AND DEPRESSED MOOD: ICD-10-CM

## 2021-05-25 DIAGNOSIS — R26.89 IMBALANCE: ICD-10-CM

## 2021-05-25 DIAGNOSIS — M79.601 PAIN OF RIGHT UPPER EXTREMITY: ICD-10-CM

## 2021-05-25 PROCEDURE — 99214 OFFICE O/P EST MOD 30 MIN: CPT | Performed by: NURSE PRACTITIONER

## 2021-05-25 NOTE — PROGRESS NOTES
"DOS: 2021  NAME: Cristy Wilde   : 1934  PCP: Arben Wright MD    Chief Complaint   Patient presents with   • Stroke   Patient unattended to today's visit.      Neurological Problem and Interval History:  86 y.o. left-handed female with a Hx of Diverticulitis, osteoarthritis, diabetes mellitus, B12 deficiency who I am seeing today in follow-up for left cerebellum infarct (annual follow-up).    Since last evaluation, patient denies any new signs and/or symptoms of stroke.  Patient reports no recent illnesses and or hospitalizations although she does state within the last 1 year she had a abrupt onset of BPPV and has undergone vestibular rehab which improved symptoms dramatically.  She sustained a fall with this event; no other falls.  BP today 100/50; patient does not monitor at home but reports that \"it is normal for the most part\".  She denies any issues with mood; she remains on Lexapro 10 mg daily and states that this year has been \"manageable\".  She assist her neighbors with getting groceries and ADLs; she does not require any assistance with her own ADLs.  She denies any issues with sleep; specifically no concern for sleep apnea.  Patient reports that she has more of a stooped posture than before and has some neck and back pain; offered referral to physical therapy which patient declined at this time.  On exam she has some discomfort when raising right upper arm; negative scratch test and empty can test on my exam-referred patient to follow-up with PCP for additional work-up/diagnostics.  Lastly, patient reports that she has some urinary incontinence and notes some \"urethral irritation\" when she is unable to change soiled pad; recommended that she discuss further with Dr. Wright-also offered referral to urology for discussion of treatment options for urinary incontinence.  Patient denies any other complaints under concerns on my exam.  I will plan to see patient annually moving " forward.      Review of Systems:        Review of Systems   Constitutional: Negative for activity change, appetite change and unexpected weight change.   HENT: Negative for facial swelling, trouble swallowing and voice change.    Eyes: Negative for photophobia, pain and visual disturbance.   Respiratory: Negative for chest tightness, shortness of breath and wheezing.    Cardiovascular: Negative for chest pain, palpitations and leg swelling.   Gastrointestinal: Negative for abdominal pain, nausea and vomiting.   Endocrine: Negative for cold intolerance and heat intolerance.   Musculoskeletal: Positive for gait problem. Negative for arthralgias, back pain, joint swelling, myalgias, neck pain and neck stiffness.   Neurological: Positive for weakness (BLE ). Negative for dizziness, tremors, seizures, syncope, facial asymmetry, speech difficulty, light-headedness, numbness and headaches.   Hematological: Does not bruise/bleed easily.   Psychiatric/Behavioral: Positive for confusion. Negative for agitation, behavioral problems, decreased concentration, dysphoric mood, hallucinations, self-injury, sleep disturbance and suicidal ideas. The patient is nervous/anxious. The patient is not hyperactive.          Current Outpatient Medications:   •  aspirin 81 MG EC tablet, Take 1 tablet by mouth Daily., Disp: 30 tablet, Rfl: 0  •  atorvastatin (LIPITOR) 20 MG tablet, TAKE ONE TABLET BY MOUTH DAILY, Disp: 90 tablet, Rfl: 6  •  Cholecalciferol (VITAMIN D3) 75 MCG (3000 UT) tablet, Take 4,000 Units by mouth Daily., Disp: , Rfl:   •  cycloSPORINE (RESTASIS) 0.05 % ophthalmic emulsion, Administer 1 drop to both eyes 2 (Two) Times a Day., Disp: , Rfl:   •  denosumab (PROLIA) 60 MG/ML solution syringe, Inject 60 mg under the skin into the appropriate area as directed Every 6 (Six) Months., Disp: , Rfl:   •  docusate sodium (COLACE) 100 MG capsule, Take 200 mg by mouth Every Night., Disp: , Rfl:   •  escitalopram (LEXAPRO) 10 MG tablet,  "TAKE ONE TABLET BY MOUTH DAILY, Disp: 90 tablet, Rfl: 1  •  glucose blood (OneTouch Ultra) test strip, 1 each by Other route Daily. DX: E11.9, Disp: 100 each, Rfl: 11  •  Lancet Devices (ONE TOUCH DELICA LANCING DEV) misc, 1 each Daily. DX: E11.9 TYPE 2 DM, Disp: 1 each, Rfl: 1  •  Lancet Devices (OneTouch Delica Plus Lancing) misc, 1 each Daily. USE ONETOUCH DELICA PLUS LANCING DEVICE ONCE DAILY FOR GLUCOSE MONITOR DX: E11.9, Disp: 1 each, Rfl: 11  •  Magnesium 250 MG tablet, Take 1 tablet by mouth Every Night., Disp: , Rfl:   •  metFORMIN ER (GLUCOPHAGE-XR) 500 MG 24 hr tablet, TAKE ONE TABLET BY MOUTH DAILY WITH BREAKFAST, Disp: 90 tablet, Rfl: 1  •  OneTouch Delica Lancets 33G misc, Take 1 each by mouth Daily. DX: E11.9 TYPE 2 DM, Disp: 100 each, Rfl: 3  •  vitamin B-12 (CYANOCOBALAMIN) 1000 MCG tablet, Take 1 tablet by mouth Daily., Disp: 30 tablet, Rfl: 5    \"The following portions of the patient's history were reviewed and updated as appropriate: allergies, current medications, past family history, past medical history, past social history, past surgical history and problem list.\"  Review and Interpretation of Imaging:  No new imaging reviewed today  Laboratory Results:             Lab Results   Component Value Date    HGBA1C 6.20 (H) 04/19/2021         Lab Results   Component Value Date    CHOL 143 05/14/2019         Lab Results   Component Value Date    HDL 70 (H) 04/19/2021    HDL 69 (H) 01/15/2021    HDL 68 (H) 10/13/2020         Lab Results   Component Value Date    LDL 71 04/19/2021    LDL 62 01/15/2021    LDL 60 10/13/2020         Lab Results   Component Value Date    TRIG 71 04/19/2021    TRIG 61 01/15/2021    TRIG 86 10/13/2020     No results found for: RPR  Lab Results   Component Value Date    TSH 1.970 03/29/2019     Lab Results   Component Value Date    TEBDNAZR51 367 05/13/2019       Physical Examination:  mRS: 0  General Appearance:   Well developed, well nourished, well groomed, alert, and " cooperative.  HEENT: Normocephalic.  Normal fundoscopic exam including normal retina, discs are flat with sharp margins, normal vasculature.  Neck and Spine: Normal range of motion.  Normal alignment. No mass or tenderness. No bruits.  Cardiac: Regular rate and rhythm. No murmurs.  Peripheral Vasculature: Radial and pedal pulses are equal and symmetric.   Extremities:    No edema or deformities. Normal joint ROM.  Skin:    No rashes or birth marks.    Neurological examination:  Higher Integrative  Function: Oriented to time, place and person. Normal registration, recall, attention span and concentration. Normal language including comprehension, spontaneous speech, repetition, reading, writing, naming and vocabulary. No neglect with normal visual-spatial function and construction. Normal fund of knowledge and higher integrative function.  CN II: Pupils are equal, round, and reactive to light. Normal visual acuity and visual fields.    CN III IV VI: Extraocular movements are full without nystagmus.   CN V: Normal facial sensation and strength of muscles of mastication.  CN VII: Facial movements are symmetric. No weakness.  CN VIII:   Auditory acuity is normal.  CN IX & X:   Symmetric palatal movement.  CN XI: Sternocleidomastoid and trapezius are normal.  No weakness.  CN XII:   The tongue is midline.  No atrophy or fasciculations.  Motor: Normal muscle strength, bulk and tone in upper and lower extremities.  No fasciculations, rigidity, spasticity, or abnormal movements.  Reflexes: Plantar responses are flexor.  Sensation: Normal to light touch, pinprick, vibration, temperature, and proprioception in arms and legs. Normal graphesthesia and no extinction on DSS.  Station and Gait: Abnormal gait: Somewhat stooped and unsteady    Coordination:  Finger to nose test shows no dysmetria.    Additional testing completed:  Scratch test and empty can test to assess rotator cuff; both negative/unremarkable      Diagnoses:  1.   "Left cerebellar infarct (May 2019)  2.  Diabetes mellitus-type II  3.  B12 deficiency  4.  Gait imbalance  5.  Right upper arm discomfort  6.  Urinary incontinence with reported urethral irritation    Plan:   Continue aspirin 81 mg daily and atorvastatin 20 mg daily along with B12 replacement and  Lexapro as written   Consider urology referral due to complaint urinary incontinence and \"urethral irritation\"   Follow-up with Dr. Wright regarding right upper arm discomfort; negative scratch and empty  Can testing   Consider resuming physical therapy to assist with gait/balance and right shoulder   Blood pressure control to <130/80   Goal LDL 40-70     Serum glucose < 140   Call [facility] for stroke any stroke symptoms   Follow-up annually; sooner if needed     Diagnoses and all orders for this visit:    1. Cerebrovascular accident (CVA), unspecified mechanism (CMS/HCC) (Primary)    2. Adjustment disorder with mixed anxiety and depressed mood    3. Controlled type 2 diabetes mellitus without complication, without long-term current use of insulin (CMS/HCC)    4. Imbalance    5. Pain of right upper extremity          "

## 2021-05-26 ENCOUNTER — INFUSION (OUTPATIENT)
Dept: ONCOLOGY | Facility: HOSPITAL | Age: 86
End: 2021-05-26

## 2021-05-26 ENCOUNTER — LAB (OUTPATIENT)
Dept: OTHER | Facility: HOSPITAL | Age: 86
End: 2021-05-26

## 2021-05-26 VITALS
HEART RATE: 85 BPM | TEMPERATURE: 96.9 F | DIASTOLIC BLOOD PRESSURE: 66 MMHG | WEIGHT: 113.4 LBS | RESPIRATION RATE: 20 BRPM | SYSTOLIC BLOOD PRESSURE: 132 MMHG | BODY MASS INDEX: 19.36 KG/M2 | OXYGEN SATURATION: 98 % | HEIGHT: 64 IN

## 2021-05-26 DIAGNOSIS — M80.00XA AGE-RELATED OSTEOPOROSIS WITH CURRENT PATHOLOGICAL FRACTURE, INITIAL ENCOUNTER: Primary | ICD-10-CM

## 2021-05-26 PROCEDURE — 83735 ASSAY OF MAGNESIUM: CPT | Performed by: INTERNAL MEDICINE

## 2021-05-26 PROCEDURE — 84100 ASSAY OF PHOSPHORUS: CPT | Performed by: INTERNAL MEDICINE

## 2021-05-26 PROCEDURE — 25010000002 DENOSUMAB 60 MG/ML SOLUTION PREFILLED SYRINGE: Performed by: INTERNAL MEDICINE

## 2021-05-26 PROCEDURE — 96372 THER/PROPH/DIAG INJ SC/IM: CPT

## 2021-05-26 PROCEDURE — 80053 COMPREHEN METABOLIC PANEL: CPT | Performed by: INTERNAL MEDICINE

## 2021-05-26 RX ADMIN — DENOSUMAB 60 MG: 60 INJECTION SUBCUTANEOUS at 13:16

## 2021-06-07 NOTE — TELEPHONE ENCOUNTER
Pt has a prolia injection scheduled for 11/23 @3:00. She spoke with them to reschedule and they said our office has to call and do this. Pt has an eye dr appointment and cannot get any other time until end of January. Pt request appointment to be changed to anytime 11/20,21 or 24th. Pt said office previously canceled  prolia visit due to covid so it has to be close as possible to the date it is scheduled. Please advise   (4) no impairment

## 2021-06-15 ENCOUNTER — HOSPITAL ENCOUNTER (OUTPATIENT)
Dept: BONE DENSITY | Facility: HOSPITAL | Age: 86
Discharge: HOME | End: 2021-06-15
Admitting: INTERNAL MEDICINE

## 2021-06-15 DIAGNOSIS — M81.0 OSTEOPOROSIS, UNSPECIFIED OSTEOPOROSIS TYPE, UNSPECIFIED PATHOLOGICAL FRACTURE PRESENCE: Chronic | ICD-10-CM

## 2021-06-15 PROCEDURE — 77080 DXA BONE DENSITY AXIAL: CPT

## 2021-06-16 ENCOUNTER — APPOINTMENT (OUTPATIENT)
Dept: BONE DENSITY | Facility: HOSPITAL | Age: 86
End: 2021-06-16

## 2021-06-22 ENCOUNTER — TELEPHONE (OUTPATIENT)
Dept: INTERNAL MEDICINE | Facility: CLINIC | Age: 86
End: 2021-06-22

## 2021-06-22 NOTE — TELEPHONE ENCOUNTER
[facility] ATTEMPTED THE WARM TRANSFER PROCESS, BUT WAS UNSUCCESSFUL.     Caller: Cristy Wilde    Relationship: Self    Best call back number: [phone]     What is the best time to reach you: ANYTIME    Who are you requesting to speak with (clinical staff, provider,  specific staff member): CLINICAL STAFF    What was the call regarding: THE PATIENT RECEIVED A CALL FROM CENTRAL SCHEDULING. SHE STATES THAT THEY ASKED HER ABOUT A DR. MICHELE (THE PATIENT STATED IT SOUNDED LIKE THIS NAME), BUT SHE DID NOT KNOW WHO THAT WAS. SHE WOULD LIKE TO KNOW WHAT IS GOING ON REGARDING HER BONE DENSITY SCAN THAT SHE HAD COMPLETED ON 06/16/2021.    Do you require a callback: YES, PLEASE

## 2021-06-22 NOTE — TELEPHONE ENCOUNTER
Verified Dr. Wright resulted Dexa scan and we relayed that result to the patient 6/17/21. She now remembers and requested a printed copy of the result, which was mailed today.

## 2021-07-20 ENCOUNTER — OFFICE VISIT (OUTPATIENT)
Dept: INTERNAL MEDICINE | Facility: CLINIC | Age: 86
End: 2021-07-20

## 2021-07-20 VITALS
BODY MASS INDEX: 19.81 KG/M2 | TEMPERATURE: 96.6 F | RESPIRATION RATE: 16 BRPM | SYSTOLIC BLOOD PRESSURE: 120 MMHG | DIASTOLIC BLOOD PRESSURE: 66 MMHG | HEART RATE: 73 BPM | WEIGHT: 116 LBS | OXYGEN SATURATION: 96 % | HEIGHT: 64 IN

## 2021-07-20 DIAGNOSIS — M81.0 OSTEOPOROSIS, UNSPECIFIED OSTEOPOROSIS TYPE, UNSPECIFIED PATHOLOGICAL FRACTURE PRESENCE: Chronic | ICD-10-CM

## 2021-07-20 DIAGNOSIS — F43.23 ADJUSTMENT DISORDER WITH MIXED ANXIETY AND DEPRESSED MOOD: ICD-10-CM

## 2021-07-20 DIAGNOSIS — F09 COGNITIVE DYSFUNCTION: Chronic | ICD-10-CM

## 2021-07-20 DIAGNOSIS — E11.9 CONTROLLED TYPE 2 DIABETES MELLITUS WITHOUT COMPLICATION, WITHOUT LONG-TERM CURRENT USE OF INSULIN (HCC): Primary | Chronic | ICD-10-CM

## 2021-07-20 PROCEDURE — 99214 OFFICE O/P EST MOD 30 MIN: CPT | Performed by: INTERNAL MEDICINE

## 2021-07-20 NOTE — PROGRESS NOTES
Subjective   Cristy Wilde is a 86 y.o. female.     Chief Complaint   Patient presents with   • Diabetes   • Cerebrovascular Accident   • Cognitive Dys         Dementia  Pertinent negatives include no chest pain.   Diabetes  She presents for her follow-up diabetic visit. She has type 2 diabetes mellitus. Her disease course has been stable. Pertinent negatives for diabetes include no chest pain. Symptoms are stable. Risk factors for coronary artery disease include diabetes mellitus and dyslipidemia. Her weight is stable. An ACE inhibitor/angiotensin II receptor blocker is not being taken. Eye exam is not current.   Hyperlipidemia  This is a chronic problem. The current episode started more than 1 year ago. The problem is controlled. Pertinent negatives include no chest pain or shortness of breath. Current antihyperlipidemic treatment includes statins. The current treatment provides significant improvement of lipids. There are no compliance problems.    Stroke  This is a chronic problem. The current episode started more than 1 year ago. The problem occurs constantly. The problem has been unchanged. Pertinent negatives include no chest pain.   Osteoporosis  This is a chronic problem. The current episode started more than 1 year ago. The problem occurs constantly. The problem has been unchanged. Pertinent negatives include no chest pain.        The following portions of the patient's history were reviewed and updated as appropriate: allergies, current medications, past social history and problem list.    Outpatient Medications Marked as Taking for the 7/20/21 encounter (Office Visit) with Arben Wright MD   Medication Sig Dispense Refill   • aspirin 81 MG EC tablet Take 1 tablet by mouth Daily. 30 tablet 0   • atorvastatin (LIPITOR) 20 MG tablet TAKE ONE TABLET BY MOUTH DAILY 90 tablet 6   • Cholecalciferol (VITAMIN D3) 75 MCG (3000 UT) tablet Take 4,000 Units by mouth Daily.     • cycloSPORINE (RESTASIS)  0.05 % ophthalmic emulsion Administer 1 drop to both eyes 2 (Two) Times a Day.     • denosumab (PROLIA) 60 MG/ML solution syringe Inject 60 mg under the skin into the appropriate area as directed Every 6 (Six) Months.     • docusate sodium (COLACE) 100 MG capsule Take 200 mg by mouth Every Night. Taking 2 capsules at night     • escitalopram (LEXAPRO) 10 MG tablet TAKE ONE TABLET BY MOUTH DAILY 90 tablet 1   • glucose blood (OneTouch Ultra) test strip 1 each by Other route Daily. DX: E11.9 100 each 11   • Lancet Devices (ONE TOUCH DELICA LANCING DEV) misc 1 each Daily. DX: E11.9 TYPE 2 DM 1 each 1   • Lancet Devices (OneTouch Delica Plus Lancing) misc 1 each Daily. USE ONETOUCH DELICA PLUS LANCING DEVICE ONCE DAILY FOR GLUCOSE MONITOR  DX: E11.9 1 each 11   • Magnesium 250 MG tablet Take 1 tablet by mouth Every Night.     • metFORMIN ER (GLUCOPHAGE-XR) 500 MG 24 hr tablet TAKE ONE TABLET BY MOUTH DAILY WITH BREAKFAST 90 tablet 1   • OneTouch Delica Lancets 33G misc Take 1 each by mouth Daily. DX: E11.9 TYPE 2  each 3   • vitamin B-12 (CYANOCOBALAMIN) 1000 MCG tablet Take 1 tablet by mouth Daily. 30 tablet 5       Review of Systems   Respiratory: Negative for shortness of breath and wheezing.    Cardiovascular: Negative for chest pain, palpitations and leg swelling.   Gastrointestinal: Positive for constipation ( controlled with stool softener). Negative for diarrhea.       Objective   Vitals:    07/20/21 1011   BP: 120/66   Pulse: 73   Resp: 16   Temp: 96.6 °F (35.9 °C)   SpO2: 96%      Wt Readings from Last 3 Encounters:   07/20/21 52.6 kg (116 lb)   05/26/21 51.4 kg (113 lb 6.4 oz)   05/25/21 51.7 kg (114 lb)    Body mass index is 19.91 kg/m².      Physical Exam   Constitutional: She appears well-developed.   Cardiovascular: Normal rate, regular rhythm and normal heart sounds. Exam reveals no gallop.   No murmur heard.  Pulmonary/Chest: Effort normal and breath sounds normal. No respiratory distress. She has  no wheezes. She has no rales.   Abdominal: Soft. Normal appearance and bowel sounds are normal. She exhibits no mass. There is no abdominal tenderness. There is no guarding.   Neurological: She is alert.         Problems Addressed this Visit        Endocrine and Metabolic    Controlled type 2 diabetes mellitus without complication, without long-term current use of insulin (CMS/[facility]) - Primary (Chronic)    Relevant Orders    Lipid Panel With / Chol / HDL Ratio    Hemoglobin A1c    Glucose, Random       Mental Health    Adjustment disorder with mixed anxiety and depressed mood       Musculoskeletal and Injuries    Osteoporosis (Chronic)       Neuro    Cognitive dysfunction (Chronic)      Diagnoses       Codes Comments    Controlled type 2 diabetes mellitus without complication, without long-term current use of insulin (CMS/[facility])    -  Primary ICD-10-CM: E11.9  ICD-9-CM: 250.00     Adjustment disorder with mixed anxiety and depressed mood     ICD-10-CM: F43.23  ICD-9-CM: 309.28     Osteoporosis, unspecified osteoporosis type, unspecified pathological fracture presence     ICD-10-CM: M81.0  ICD-9-CM: 733.00     Cognitive dysfunction     ICD-10-CM: F09  ICD-9-CM: 294.9         Assessment/Plan   In for follow-up of diabetes and hyperlipidemia.  She had a stroke in May 2019 and developed diabetes mellitus shortly after that time.  She is having some troubles with memory.  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  Irregardless her medications look good.  She is on a statin and aspirin.  Annual lab work done January 2021 including CBC, CMP, lipids, A1c.  She will need quarterly lab work including glucose, A1c, and lipids every 3 months.  Gets Prolia shots every 6 months for osteoporosis.  Finances are tight so it may be difficult to get neuropsychometric evaluation.  I suspect developing dementia.  We checked an MMSE at 25  down from 28 down from 29 up from 25 down from 29 and [place] at 25 down from 26 up from 23 up from 20 down from 22 today.  She is fasting today.  Due for Shingrix.  She may be done with mammograms and she will check her records.  She is fasting today.    The above information was reviewed again today 07/20/21.  It continues to be accurate as reflected above and is unchanged.  History, physical and review of systems all reviewed and are unchanged.  Medications were reviewed today and continue the current dosing.    PPE today includes face mask and eye shield.            Dragon disclaimer:   Much of this encounter note is an electronic transcription/translation of spoken language to printed text. The electronic translation of spoken language may permit erroneous, or at times, nonsensical words or phrases to be inadvertently transcribed; Although I have reviewed the note for such errors, some may still exist.

## 2021-07-21 LAB
CHOLEST SERPL-MCNC: 136 MG/DL (ref 0–200)
CHOLEST/HDLC SERPL: 2.19 {RATIO}
GLUCOSE SERPL-MCNC: 121 MG/DL (ref 65–99)
HBA1C MFR BLD: 6.3 % (ref 4.8–5.6)
HDLC SERPL-MCNC: 62 MG/DL (ref 40–60)
LDLC SERPL CALC-MCNC: 61 MG/DL (ref 0–100)
TRIGL SERPL-MCNC: 60 MG/DL (ref 0–150)
VLDLC SERPL CALC-MCNC: 13 MG/DL (ref 5–40)

## 2021-08-06 ENCOUNTER — OFFICE VISIT (OUTPATIENT)
Dept: INTERNAL MEDICINE | Facility: CLINIC | Age: 86
End: 2021-08-06

## 2021-08-06 VITALS
TEMPERATURE: 96.6 F | DIASTOLIC BLOOD PRESSURE: 68 MMHG | OXYGEN SATURATION: 96 % | WEIGHT: 113 LBS | SYSTOLIC BLOOD PRESSURE: 122 MMHG | HEART RATE: 70 BPM | RESPIRATION RATE: 16 BRPM | HEIGHT: 64 IN | BODY MASS INDEX: 19.29 KG/M2

## 2021-08-06 DIAGNOSIS — H61.21 IMPACTED CERUMEN OF RIGHT EAR: Primary | ICD-10-CM

## 2021-08-06 PROCEDURE — 99213 OFFICE O/P EST LOW 20 MIN: CPT | Performed by: INTERNAL MEDICINE

## 2021-08-06 NOTE — PROGRESS NOTES
Subjective   Cristy Wilde is a 86 y.o. female.     Chief Complaint   Patient presents with   • Rt. Ear Wax Build Up         In with decreased hearing right ear.  Symptoms go back a few months.  Trouble with hearing aid on that side and was told she was wax in impacted by her hearing aid company.       The following portions of the patient's history were reviewed and updated as appropriate: allergies, current medications, past social history and problem list.    Outpatient Medications Marked as Taking for the 8/6/21 encounter (Office Visit) with Arben Wright MD   Medication Sig Dispense Refill   • aspirin 81 MG EC tablet Take 1 tablet by mouth Daily. 30 tablet 0   • atorvastatin (LIPITOR) 20 MG tablet TAKE ONE TABLET BY MOUTH DAILY 90 tablet 6   • Cholecalciferol (VITAMIN D3) 75 MCG (3000 UT) tablet Take 4,000 Units by mouth Daily.     • cycloSPORINE (RESTASIS) 0.05 % ophthalmic emulsion Administer 1 drop to both eyes 2 (Two) Times a Day.     • denosumab (PROLIA) 60 MG/ML solution syringe Inject 60 mg under the skin into the appropriate area as directed Every 6 (Six) Months.     • docusate sodium (COLACE) 100 MG capsule Take 200 mg by mouth Every Night. Taking 2 capsules at night     • escitalopram (LEXAPRO) 10 MG tablet TAKE ONE TABLET BY MOUTH DAILY 90 tablet 1   • glucose blood (OneTouch Ultra) test strip 1 each by Other route Daily. DX: E11.9 100 each 11   • Lancet [employer] (ONE TOUCH DELICA LANCING DEV) misc 1 each Daily. DX: E11.9 TYPE 2 DM 1 each 1   • Lancet Devices (OneTouch Delica Plus Lancing) misc 1 each Daily. USE ONETOUCH DELICA PLUS LANCING DEVICE ONCE DAILY FOR GLUCOSE MONITOR  DX: E11.9 1 each 11   • Magnesium 250 MG tablet Take 1 tablet by mouth Every Night.     • metFORMIN ER (GLUCOPHAGE-XR) 500 MG 24 hr tablet TAKE ONE TABLET BY MOUTH DAILY WITH BREAKFAST 90 tablet 1   • OneTouch Delica Lancets 33G misc Take 1 each by mouth Daily. DX: E11.9 TYPE 2  each 3   • vitamin B-12  (CYANOCOBALAMIN) 1000 MCG tablet Take 1 tablet by mouth Daily. 30 tablet 5       Review of Systems   HENT: Positive for hearing loss. Negative for ear pain.        Objective   Vitals:    08/06/21 1342   BP: 122/68   Pulse: 70   Resp: 16   Temp: 96.6 °F (35.9 °C)   SpO2: 96%      Wt Readings from Last 3 Encounters:   08/06/21 51.3 kg (113 lb)   07/20/21 52.6 kg (116 lb)   05/26/21 51.4 kg (113 lb 6.4 oz)    Body mass index is 19.4 kg/m².      Physical Exam  HENT:      Right Ear: Tympanic membrane normal. There is impacted cerumen.      Left Ear: Tympanic membrane normal.           Problems Addressed this Visit     None      Visit Diagnoses     Impacted cerumen of right ear    -  Primary      Diagnoses       Codes Comments    Impacted cerumen of right ear    -  Primary ICD-10-CM: H61.21  ICD-9-CM: 380.4         Assessment/Plan   In today with hearing loss right ear and hearing aid not working well.  Was told she had wax in that ear.  The right ear is completely impacted.  The left ear is completely normal.  We irrigated the right ear clear about 90%.  A little area of adherent clear wax externally that I was unable to remove with the spoon.  She will use peroxide 3 times daily for 1 week 10 minutes each time and see if that clears up her issues.    The above information was reviewed again today 08/06/21.  It continues to be accurate as reflected above and is unchanged.  History, physical and review of systems all reviewed and are unchanged.  Medications were reviewed today and continue the current dosing.    PPE today includes face mask and eye shield.             Dragon disclaimer:   Much of this encounter note is an electronic transcription/translation of spoken language to printed text. The electronic translation of spoken language may permit erroneous, or at times, nonsensical words or phrases to be inadvertently transcribed; Although I have reviewed the note for such errors, some may still exist.

## 2021-08-13 ENCOUNTER — TELEPHONE (OUTPATIENT)
Dept: INTERNAL MEDICINE | Facility: CLINIC | Age: 86
End: 2021-08-13

## 2021-08-13 DIAGNOSIS — H61.21 IMPACTED CERUMEN OF RIGHT EAR: Primary | ICD-10-CM

## 2021-08-13 NOTE — TELEPHONE ENCOUNTER
PATIENT CALLED TO CHECK WITH DR MENA IF COULD PLACE A REFERRAL FOR AN ENT AFTER THE FAILED EXTRACTION OF PIECE OF WAX STUCK IN HER EAR,  PLEASE ADVISE    [phone]

## 2021-08-18 ENCOUNTER — TELEPHONE (OUTPATIENT)
Dept: INTERNAL MEDICINE | Facility: CLINIC | Age: 86
End: 2021-08-18

## 2021-08-18 NOTE — TELEPHONE ENCOUNTER
PATIENT CALLED TO CHECK WITH DR MENA IF WILL BE NEEDING TO HAVE THE PFIZER COVID BOOSTER SHOT. GOT HER FIRST TWO SHOTS IN FEBRUARY AND MARCH  PLEASE ADVISE    [phone]  CAN LEAVE A DETAILED MESSAGE IF DOESN'T

## 2021-09-09 ENCOUNTER — TELEPHONE (OUTPATIENT)
Dept: INTERNAL MEDICINE | Facility: CLINIC | Age: 86
End: 2021-09-09

## 2021-09-09 NOTE — TELEPHONE ENCOUNTER
Caller: Cristy Wilde    Relationship: Self    Best call back number: [phone]    Who are you requesting to speak with (clinical staff, provider,  specific staff member): CLINICAL STAFF     What was the call regarding: WANTS TO KNOW WHEN SHE CAN GET THE COVID BOOSTER SHOT. LAST SHOT WAS 3/10/21     Do you require a callback: YES

## 2021-09-09 NOTE — TELEPHONE ENCOUNTER
Called informed patient eligibility for covid booster are those with immune deficiency or on chemotherapy.

## 2021-09-27 RX ORDER — ESCITALOPRAM OXALATE 10 MG/1
TABLET ORAL
Qty: 90 TABLET | Refills: 1 | Status: SHIPPED | OUTPATIENT
Start: 2021-09-27 | End: 2022-05-13

## 2021-09-28 ENCOUNTER — TELEPHONE (OUTPATIENT)
Dept: INTERNAL MEDICINE | Facility: CLINIC | Age: 86
End: 2021-09-28

## 2021-09-28 NOTE — TELEPHONE ENCOUNTER
Caller: Cristy Wilde    Relationship: Self    Best call back number: [phone]    Who are you requesting to speak with (clinical staff, provider,  specific staff member): [place]    What was the call regarding: PATIENT WOULD LIKE TO BE ADVISED ON WHICH DOSE OF THE FLU VACCINE TO TAKE.     Do you require a callback: YES

## 2021-09-28 NOTE — TELEPHONE ENCOUNTER
Caller: Cristy Wilde    Relationship: Self    Best call back number: [phone]    Who are you requesting to speak with (clinical staff, provider,  specific staff member): NURSE    What was the call regarding: PATIENT IS WANTING TO KNOW IF SHE SHOULD GET THE COVID BOOSTER OR THE FLU SHOT FIRST (THE HIGH DOSE FOR ELDERLY PATIENTS). SHE DOESN'T WANT TO GET THEM ON THE SAME DAY. PLEASE CALL AND ADVISE.     Do you require a callback: YES

## 2021-10-18 ENCOUNTER — OFFICE VISIT (OUTPATIENT)
Dept: INTERNAL MEDICINE | Facility: CLINIC | Age: 86
End: 2021-10-18

## 2021-10-18 VITALS
BODY MASS INDEX: 19.46 KG/M2 | DIASTOLIC BLOOD PRESSURE: 60 MMHG | TEMPERATURE: 97.4 F | OXYGEN SATURATION: 98 % | SYSTOLIC BLOOD PRESSURE: 120 MMHG | HEART RATE: 66 BPM | WEIGHT: 114 LBS | HEIGHT: 64 IN | RESPIRATION RATE: 16 BRPM

## 2021-10-18 DIAGNOSIS — E11.9 CONTROLLED TYPE 2 DIABETES MELLITUS WITHOUT COMPLICATION, WITHOUT LONG-TERM CURRENT USE OF INSULIN (HCC): Primary | Chronic | ICD-10-CM

## 2021-10-18 DIAGNOSIS — M81.0 OSTEOPOROSIS, UNSPECIFIED OSTEOPOROSIS TYPE, UNSPECIFIED PATHOLOGICAL FRACTURE PRESENCE: Chronic | ICD-10-CM

## 2021-10-18 DIAGNOSIS — F09 COGNITIVE DYSFUNCTION: Chronic | ICD-10-CM

## 2021-10-18 PROCEDURE — 99214 OFFICE O/P EST MOD 30 MIN: CPT | Performed by: INTERNAL MEDICINE

## 2021-10-18 PROCEDURE — 1160F RVW MEDS BY RX/DR IN RCRD: CPT | Performed by: INTERNAL MEDICINE

## 2021-10-18 PROCEDURE — G0439 PPPS, SUBSEQ VISIT: HCPCS | Performed by: INTERNAL MEDICINE

## 2021-10-18 PROCEDURE — 1170F FXNL STATUS ASSESSED: CPT | Performed by: INTERNAL MEDICINE

## 2021-10-18 NOTE — PROGRESS NOTES
Subjective   Cristy Wilde is a 86 y.o. female.     Chief Complaint   Patient presents with   • Medicare Wellness-Initial Visit   • Hyperlipidemia   • Diabetes         Hyperlipidemia  This is a chronic problem. The current episode started more than 1 year ago. The problem is controlled. Pertinent negatives include no shortness of breath.   Diabetes  She presents for her follow-up diabetic visit. She has type 2 diabetes mellitus. Her disease course has been stable.   Dementia  The current episode started more than 1 year ago. The problem occurs constantly.   Stroke  This is a chronic problem. The current episode started more than 1 year ago. The problem occurs constantly. The problem has been unchanged.   Osteoporosis  This is a chronic problem. The current episode started more than 1 year ago. The problem occurs constantly. The problem has been unchanged.        The following portions of the patient's history were reviewed and updated as appropriate: allergies, current medications, past social history and problem list.    Outpatient Medications Marked as Taking for the 10/18/21 encounter (Office Visit) with Arben Wright MD   Medication Sig Dispense Refill   • aspirin 81 MG EC tablet Take 1 tablet by mouth Daily. 30 tablet 0   • atorvastatin (LIPITOR) 20 MG tablet TAKE ONE TABLET BY MOUTH DAILY 90 tablet 6   • Cholecalciferol (VITAMIN D3) 75 MCG (3000 UT) tablet Take 4,000 Units by mouth Daily.     • cycloSPORINE (RESTASIS) 0.05 % ophthalmic emulsion Administer 1 drop to both eyes 2 (Two) Times a Day.     • denosumab (PROLIA) 60 MG/ML solution syringe Inject 60 mg under the skin into the appropriate area as directed Every 6 (Six) Months.     • docusate sodium (COLACE) 100 MG capsule Take 200 mg by mouth Every Night. Taking 2 capsules at night     • escitalopram (LEXAPRO) 10 MG tablet TAKE ONE TABLET BY MOUTH DAILY 90 tablet 1   • glucose blood (OneTouch Ultra) test strip 1 each by Other route Daily. DX:  E11.9 100 each 11   • Lancet Devices (ONE TOUCH DELICA LANCING DEV) misc 1 each Daily. DX: E11.9 TYPE 2 DM 1 each 1   • Lancet Devices (OneTouch Delica Plus Lancing) misc 1 each Daily. USE ONETOUCH DELICA PLUS LANCING DEVICE ONCE DAILY FOR GLUCOSE MONITOR  DX: E11.9 1 each 11   • Magnesium 250 MG tablet Take 1 tablet by mouth Every Night.     • metFORMIN ER (GLUCOPHAGE-XR) 500 MG 24 hr tablet TAKE ONE TABLET BY MOUTH DAILY WITH BREAKFAST 90 tablet 1   • OneTouch Delica Lancets 33G misc Take 1 each by mouth Daily. DX: E11.9 TYPE 2  each 3   • vitamin B-12 (CYANOCOBALAMIN) 1000 MCG tablet Take 1 tablet by mouth Daily. 30 tablet 5       Review of Systems   Respiratory: Negative for shortness of breath and wheezing.    Cardiovascular: Negative for palpitations and leg swelling.   Gastrointestinal: Positive for constipation ( controlled with stool softener). Negative for diarrhea.       Objective   Vitals:    10/18/21 1007   BP: 120/60   Pulse: 66   Resp: 16   Temp: 97.4 °F (36.3 °C)   SpO2: 98%      Wt Readings from Last 3 Encounters:   10/18/21 51.7 kg (114 lb)   08/06/21 51.3 kg (113 lb)   07/20/21 52.6 kg (116 lb)    Body mass index is 19.57 kg/m².      Physical Exam   Constitutional: She appears well-developed.   Cardiovascular: Normal rate, regular rhythm and normal heart sounds. Exam reveals no gallop.   No murmur heard.  Pulmonary/Chest: Effort normal and breath sounds normal. No respiratory distress. She has no wheezes. She has no rales.   Abdominal: Soft. Normal appearance and bowel sounds are normal. She exhibits no mass. There is no abdominal tenderness. There is no guarding.   Neurological: She is alert.         Problems Addressed this Visit        Endocrine and Metabolic    Controlled type 2 diabetes mellitus without complication, without long-term current use of insulin ([facility]) - Primary (Chronic)       Musculoskeletal and Injuries    Osteoporosis (Chronic)       Neuro    Cognitive dysfunction  (Chronic)      Diagnoses       Codes Comments    Controlled type 2 diabetes mellitus without complication, without long-term current use of insulin ([facility])    -  Primary ICD-10-CM: E11.9  ICD-9-CM: 250.00     Osteoporosis, unspecified osteoporosis type, unspecified pathological fracture presence     ICD-10-CM: M81.0  ICD-9-CM: 733.00     Cognitive dysfunction     ICD-10-CM: F09  ICD-9-CM: 294.9         Assessment/Plan   In for follow-up of diabetes and hyperlipidemia.  She had a stroke in May 2019 and developed diabetes mellitus shortly after that time.  She is having some troubles with memory.  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  Irregardless her medications look good.  She is on a statin, Metformin and aspirin.  Annual lab work done January 2021 including CBC, CMP, lipids, A1c.  She will need  lab work including glucose, A1c, and lipids every 3 months.  Gets Prolia shots every 6 months for osteoporosis.  Finances are tight so it may be difficult to get neuropsychometric evaluation.  I suspect developing dementia.  We checked an MMSE at 30 up from 25 down from 28 down from 29 up from 25 down from 29 and [place] at 26 up from 25 down from 26 up from 23 up from 20 down from 22 today. Annual wellness today October 2021. Due for Shingrix.  She is fasting today.    The above information was reviewed again today 10/18/21.  It continues to be accurate as reflected above and is unchanged.  History, physical and review of systems all reviewed and are unchanged.  Medications were reviewed today and continue the current dosing.    PPE today includes face mask and eye shield.         Dragon disclaimer:   Much of this encounter note is an electronic transcription/translation of spoken language to printed text. The electronic translation of spoken language may permit erroneous, or at times, nonsensical words or phrases to  be inadvertently transcribed; Although I have reviewed the note for such errors, some may still exist.

## 2021-10-18 NOTE — PROGRESS NOTES
The ABCs of the Annual Wellness Visit  Subsequent Medicare Wellness Visit    Chief Complaint   Patient presents with   • Medicare Wellness-Initial Visit   • Hyperlipidemia   • Diabetes      Subjective    History of Present Illness:  Cristy Wilde is a 86 y.o. female who presents for a Subsequent Medicare Wellness Visit.    The following portions of the patient's history were reviewed and   updated as appropriate: allergies, current medications, past family history, past medical history, past social history, past surgical history and problem list.    Compared to one year ago, the patient feels her physical   health is the same.    Compared to one year ago, the patient feels her mental   health is the same.    Recent Hospitalizations:  She was not admitted to the hospital during the last year.       Current Medical Providers:  Patient Care Team:  Arben Wright MD as PCP - General (Internal Medicine)  Jerardo Kaur MD as PCP - Family Medicine  Albino Childs MD as Consulting Physician (Ophthalmology)  MAE Davis MD as Consulting Physician (Ophthalmology)    Outpatient Medications Prior to Visit   Medication Sig Dispense Refill   • aspirin 81 MG EC tablet Take 1 tablet by mouth Daily. 30 tablet 0   • atorvastatin (LIPITOR) 20 MG tablet TAKE ONE TABLET BY MOUTH DAILY 90 tablet 6   • Cholecalciferol (VITAMIN D3) 75 MCG (3000 UT) tablet Take 4,000 Units by mouth Daily.     • cycloSPORINE (RESTASIS) 0.05 % ophthalmic emulsion Administer 1 drop to both eyes 2 (Two) Times a Day.     • denosumab (PROLIA) 60 MG/ML solution syringe Inject 60 mg under the skin into the appropriate area as directed Every 6 (Six) Months.     • docusate sodium (COLACE) 100 MG capsule Take 200 mg by mouth Every Night. Taking 2 capsules at night     • escitalopram (LEXAPRO) 10 MG tablet TAKE ONE TABLET BY MOUTH DAILY 90 tablet 1   • glucose blood (OneTouch Ultra) test strip 1 each by Other route Daily. DX: E11.9 100 each 11   •  Lancet Devices (ONE TOUCH DELICA LANCING DEV) misc 1 each Daily. DX: E11.9 TYPE 2 DM 1 each 1   • Lancet Devices (OneTouch Delica Plus Lancing) misc 1 each Daily. USE ONETOUCH DELICA PLUS LANCING DEVICE ONCE DAILY FOR GLUCOSE MONITOR  DX: E11.9 1 each 11   • Magnesium 250 MG tablet Take 1 tablet by mouth Every Night.     • metFORMIN ER (GLUCOPHAGE-XR) 500 MG 24 hr tablet TAKE ONE TABLET BY MOUTH DAILY WITH BREAKFAST 90 tablet 1   • OneTouch Delica Lancets 33G misc Take 1 each by mouth Daily. DX: E11.9 TYPE 2  each 3   • vitamin B-12 (CYANOCOBALAMIN) 1000 MCG tablet Take 1 tablet by mouth Daily. 30 tablet 5     No facility-administered medications prior to visit.       No opioid medication identified on active medication list. I have reviewed chart for other potential  high risk medication/s and harmful drug interactions in the elderly.          Aspirin is on active medication list. Aspirin use is indicated based on review of current medical condition/s. Pros and cons of this therapy have been discussed today. Benefits of this medication outweigh potential harm.  Patient has been encouraged to continue taking this medication.  .      Patient Active Problem List   Diagnosis   • Chronic rhinitis   • Dry eyes   • Hearing loss   • Osteoporosis   • Adjustment disorder with mixed anxiety and depressed mood   • Onychomycosis   • Controlled type 2 diabetes mellitus without complication, without long-term current use of insulin (HCC)   • Pruritus   • Stress incontinence in female   • Xerosis of skin   • Chronic midline low back pain without sciatica   • Osteoporosis   • Stroke (HCC)   • Cognitive dysfunction     Advance Care Planning  Advance Directive is not on file.  ACP discussion was held with the patient during this visit. Patient has an advance directive (not in EMR), copy requested.          Objective    Vitals:    10/18/21 1007   BP: 120/60   Pulse: 66   Resp: 16   Temp: 97.4 °F (36.3 °C)   TempSrc: Temporal  "  SpO2: 98%   Weight: 51.7 kg (114 lb)   Height: 162.6 cm (64\")     BMI Readings from Last 1 Encounters:   10/18/21 19.57 kg/m²   BMI is within normal parameters. No follow-up required.    Does the patient have evidence of cognitive impairment? Yes    Physical Exam  Lab Results   Component Value Date     (H) 10/18/2021    CHLPL 160 10/18/2021    TRIG 53 10/18/2021    HDL 65 10/18/2021    LDL 84 10/18/2021    VLDL 11 10/18/2021    HGBA1C 6.3 (H) 10/18/2021            HEALTH RISK ASSESSMENT    Smoking Status:  Social History     Tobacco Use   Smoking Status Never Smoker   Smokeless Tobacco Never Used     Alcohol Consumption:  Social History     Substance and Sexual Activity   Alcohol Use Not Currently    Comment: quit in 2018     Fall Risk Screen:    HEATHER Fall Risk Assessment has not been completed.    Depression Screening:  PHQ-2/PHQ-9 Depression Screening 10/18/2021   Little interest or pleasure in doing things 0   Feeling down, depressed, or hopeless 0   Trouble falling or staying asleep, or sleeping too much 0   Feeling tired or having little energy 0   Poor appetite or overeating 0   Feeling bad about yourself - or that you are a failure or have let yourself or your family down 0   Trouble concentrating on things, such as reading the newspaper or watching television 0   Moving or speaking so slowly that other people could have noticed. Or the opposite - being so fidgety or restless that you have been moving around a lot more than usual 0   Thoughts that you would be better off dead, or of hurting yourself in some way 0   Total Score 0   If you checked off any problems, how difficult have these problems made it for you to do your work, take care of things at home, or get along with other people? -       Health Habits and Functional and Cognitive Screening:  Functional & Cognitive Status 10/18/2021   Do you have difficulty preparing food and eating? No   Do you have difficulty bathing yourself, getting " dressed or grooming yourself? No   Do you have difficulty using the toilet? No   Do you have difficulty moving around from place to place? No   Do you have trouble with steps or getting out of a bed or a chair? No   Current Diet Well Balanced Diet   Dental Exam Up to date   Eye Exam Up to date   Exercise (times per week) 0 times per week   Current Exercises Include No Regular Exercise   Current Exercise Activities Include -   Do you need help using the phone?  No   Are you deaf or do you have serious difficulty hearing?  No   Do you need help with transportation? No   Do you need help shopping? No   Do you need help preparing meals?  No   Do you need help with housework?  No   Do you need help with laundry? No   Do you need help taking your medications? No   Do you need help managing money? No   Do you ever drive or ride in a car without wearing a seat belt? No   Have you felt unusual stress, anger or loneliness in the last month? No   Who do you live with? Alone   If you need help, do you have trouble finding someone available to you? No   Have you been bothered in the last four weeks by sexual problems? No   Do you have difficulty concentrating, remembering or making decisions? -       Age-appropriate Screening Schedule:  Refer to the list below for future screening recommendations based on patient's age, sex and/or medical conditions. Orders for these recommended tests are listed in the plan section. The patient has been provided with a written plan.    Health Maintenance   Topic Date Due   • ZOSTER VACCINE (2 of 3) 02/26/2010   • MAMMOGRAM  05/07/2021   • DIABETIC EYE EXAM  11/23/2021   • URINE MICROALBUMIN  01/15/2022   • HEMOGLOBIN A1C  04/18/2022   • DXA SCAN  06/15/2023   • TDAP/TD VACCINES (3 - Td or Tdap) 07/10/2030   • INFLUENZA VACCINE  Completed              Assessment/Plan   CMS Preventative Services Quick Reference  Risk Factors Identified During Encounter  Dementia/Memory   Fall Risk-High or  Moderate  The above risks/problems have been discussed with the patient.  Follow up actions/plans if indicated are seen below in the Assessment/Plan Section.  Pertinent information has been shared with the patient in the After Visit Summary.    Diagnoses and all orders for this visit:    1. Controlled type 2 diabetes mellitus without complication, without long-term current use of insulin (HCC) (Primary)  -     Lipid Panel With / Chol / HDL Ratio  -     Hemoglobin A1c  -     Glucose, Random    2. Osteoporosis, unspecified osteoporosis type, unspecified pathological fracture presence    3. Cognitive dysfunction        Follow Up:   Return in about 3 months (around 1/18/2022) for Recheck dm, stroke, cognitive dys..     An After Visit Summary and PPPS were made available to the patient.        I spent 15 minutes caring for Cristy on this date of service. This time includes time spent by me in the following activities:preparing for the visit

## 2021-10-19 LAB
CHOLEST SERPL-MCNC: 160 MG/DL (ref 100–199)
CHOLEST/HDLC SERPL: 2.5 RATIO (ref 0–4.4)
GLUCOSE SERPL-MCNC: 126 MG/DL (ref 65–99)
HBA1C MFR BLD: 6.3 % (ref 4.8–5.6)
HDLC SERPL-MCNC: 65 MG/DL
LDLC SERPL CALC-MCNC: 84 MG/DL (ref 0–99)
TRIGL SERPL-MCNC: 53 MG/DL (ref 0–149)
VLDLC SERPL CALC-MCNC: 11 MG/DL (ref 5–40)

## 2021-10-25 ENCOUNTER — TELEPHONE (OUTPATIENT)
Dept: INTERNAL MEDICINE | Facility: CLINIC | Age: 86
End: 2021-10-25

## 2021-10-25 NOTE — TELEPHONE ENCOUNTER
Caller: Cristy Wilde    Relationship: Self    Best call back number: [phone]     What is the best time to reach you: ANY TIME    Who are you requesting to speak with (clinical staff, provider,  specific staff member): CLINICAL    What was the call regarding: PATIENT CALLED SAYING SHE HEARD HOW TAKING BIOTIN WAS BENEFICIAL FOR HER HAIR AND NAILS, AND CINNAMON LOWER A1C. SHE JUST WANTED TO CHECK WITH DR. MENA IF THESE WERE THINGS SHE COULD START TAKING.

## 2021-11-30 ENCOUNTER — LAB (OUTPATIENT)
Dept: OTHER | Facility: HOSPITAL | Age: 86
End: 2021-11-30

## 2021-11-30 ENCOUNTER — INFUSION (OUTPATIENT)
Dept: ONCOLOGY | Facility: HOSPITAL | Age: 86
End: 2021-11-30

## 2021-11-30 VITALS — BODY MASS INDEX: 19.22 KG/M2 | WEIGHT: 112 LBS | TEMPERATURE: 96.9 F

## 2021-11-30 DIAGNOSIS — M80.00XA AGE-RELATED OSTEOPOROSIS WITH CURRENT PATHOLOGICAL FRACTURE, INITIAL ENCOUNTER: Primary | ICD-10-CM

## 2021-11-30 PROCEDURE — 84100 ASSAY OF PHOSPHORUS: CPT | Performed by: INTERNAL MEDICINE

## 2021-11-30 PROCEDURE — 80053 COMPREHEN METABOLIC PANEL: CPT | Performed by: INTERNAL MEDICINE

## 2021-11-30 PROCEDURE — 96372 THER/PROPH/DIAG INJ SC/IM: CPT

## 2021-11-30 PROCEDURE — 25010000002 DENOSUMAB 60 MG/ML SOLUTION PREFILLED SYRINGE: Performed by: INTERNAL MEDICINE

## 2021-11-30 PROCEDURE — 83735 ASSAY OF MAGNESIUM: CPT | Performed by: INTERNAL MEDICINE

## 2021-11-30 RX ADMIN — DENOSUMAB 60 MG: 60 INJECTION SUBCUTANEOUS at 13:14

## 2021-11-30 NOTE — NURSING NOTE
Arrived  for prolia injection. Indication and side effects reviewed. Denies recent dental work. Labs and medications verified. Prolia administered in  Left arm without incidence. Instructed to call prescribing MD for any concerns or questions and instructed on how to schedule future appts.  Pt vu and discharged ambulatory.    Lab Results   Component Value Date    WBC 6.09 01/15/2021    HGB 14.1 01/15/2021    HCT 41.4 01/15/2021    MCV 89.6 01/15/2021     01/15/2021     Lab Results   Component Value Date    GLUCOSE 120 (H) 11/30/2021    BUN 26 (H) 11/30/2021    CREATININE 0.81 11/30/2021    EGFRIFNONA 67 11/30/2021    EGFRIFAFRI 92 01/15/2021    BCR 32.1 (H) 11/30/2021    K 4.2 11/30/2021    CO2 30.7 (H) 11/30/2021    CALCIUM 9.9 11/30/2021    PROTENTOTREF 6.6 01/15/2021    ALBUMIN 4.10 11/30/2021    LABIL2 1.9 01/15/2021    AST 23 11/30/2021    ALT 21 11/30/2021

## 2021-12-09 RX ORDER — METFORMIN HYDROCHLORIDE 500 MG/1
TABLET, EXTENDED RELEASE ORAL
Qty: 90 TABLET | Refills: 1 | Status: SHIPPED | OUTPATIENT
Start: 2021-12-09 | End: 2022-08-22

## 2022-01-04 DIAGNOSIS — E11.9 CONTROLLED TYPE 2 DIABETES MELLITUS WITHOUT COMPLICATION, WITHOUT LONG-TERM CURRENT USE OF INSULIN: Primary | ICD-10-CM

## 2022-01-05 RX ORDER — BLOOD SUGAR DIAGNOSTIC
STRIP MISCELLANEOUS
Qty: 100 EACH | Refills: 1 | Status: SHIPPED | OUTPATIENT
Start: 2022-01-05 | End: 2022-01-06 | Stop reason: SDUPTHER

## 2022-01-06 RX ORDER — BLOOD SUGAR DIAGNOSTIC
1 STRIP MISCELLANEOUS DAILY
Qty: 100 EACH | Refills: 1 | Status: SHIPPED | OUTPATIENT
Start: 2022-01-06 | End: 2022-11-18

## 2022-01-06 NOTE — TELEPHONE ENCOUNTER
Pharmacy needing new script with diagnosis on prescription in order to bill insurance. Script sent

## 2022-01-19 ENCOUNTER — OFFICE VISIT (OUTPATIENT)
Dept: INTERNAL MEDICINE | Facility: CLINIC | Age: 87
End: 2022-01-19

## 2022-01-19 VITALS
WEIGHT: 112 LBS | RESPIRATION RATE: 16 BRPM | TEMPERATURE: 97.3 F | HEART RATE: 81 BPM | OXYGEN SATURATION: 98 % | SYSTOLIC BLOOD PRESSURE: 112 MMHG | BODY MASS INDEX: 19.12 KG/M2 | DIASTOLIC BLOOD PRESSURE: 72 MMHG | HEIGHT: 64 IN

## 2022-01-19 DIAGNOSIS — E11.9 TYPE 2 DIABETES MELLITUS WITHOUT COMPLICATION, WITHOUT LONG-TERM CURRENT USE OF INSULIN: ICD-10-CM

## 2022-01-19 DIAGNOSIS — Z12.31 SCREENING MAMMOGRAM, ENCOUNTER FOR: ICD-10-CM

## 2022-01-19 DIAGNOSIS — Z79.899 MEDICATION MANAGEMENT: ICD-10-CM

## 2022-01-19 DIAGNOSIS — F09 COGNITIVE DYSFUNCTION: Chronic | ICD-10-CM

## 2022-01-19 DIAGNOSIS — M81.0 OSTEOPOROSIS, UNSPECIFIED OSTEOPOROSIS TYPE, UNSPECIFIED PATHOLOGICAL FRACTURE PRESENCE: Primary | Chronic | ICD-10-CM

## 2022-01-19 LAB
POC CREATININE URINE: 50
POC MICROALBUMIN URINE: 30

## 2022-01-19 PROCEDURE — 82044 UR ALBUMIN SEMIQUANTITATIVE: CPT | Performed by: INTERNAL MEDICINE

## 2022-01-19 PROCEDURE — 99214 OFFICE O/P EST MOD 30 MIN: CPT | Performed by: INTERNAL MEDICINE

## 2022-01-19 NOTE — PROGRESS NOTES
Subjective   Cristy Wilde is a 87 y.o. female.     Chief Complaint   Patient presents with   • Cerebrovascular Accident   • Diabetes   • Congestive Heart Failure         Cerebrovascular Accident  This is a chronic problem. The current episode started more than 1 year ago. The problem occurs constantly. Pertinent negatives include no chest pain, coughing, diaphoresis or fever.   Diabetes  She presents for her follow-up diabetic visit. She has type 2 diabetes mellitus. Her disease course has been stable. Pertinent negatives for diabetes include no chest pain. Diabetic complications include a CVA.   Hyperlipidemia  This is a chronic problem. The current episode started more than 1 year ago. The problem is controlled. Pertinent negatives include no chest pain or shortness of breath.   Dementia  This is a chronic problem. The current episode started more than 1 year ago. The problem occurs constantly. Pertinent negatives include no chest pain, coughing, diaphoresis or fever.   Stroke  This is a chronic problem. The current episode started more than 1 year ago. The problem occurs constantly. The problem has been unchanged. Pertinent negatives include no chest pain, coughing, diaphoresis or fever.   Osteoporosis  This is a chronic problem. The current episode started more than 1 year ago. The problem occurs constantly. The problem has been unchanged. Pertinent negatives include no chest pain, coughing, diaphoresis or fever.        The following portions of the patient's history were reviewed and updated as appropriate: allergies, current medications, past social history and problem list.    Outpatient Medications Marked as Taking for the 1/19/22 encounter (Office Visit) with Arben Wright MD   Medication Sig Dispense Refill   • aspirin 81 MG EC tablet Take 1 tablet by mouth Daily. 30 tablet 0   • atorvastatin (LIPITOR) 20 MG tablet TAKE ONE TABLET BY MOUTH DAILY 90 tablet 6   • Cholecalciferol (VITAMIN D3) 75  MCG (3000 UT) tablet Take 4,000 Units by mouth Daily.     • cycloSPORINE (RESTASIS) 0.05 % ophthalmic emulsion Administer 1 drop to both eyes 2 (Two) Times a Day.     • denosumab (PROLIA) 60 MG/ML solution syringe Inject 60 mg under the skin into the appropriate area as directed Every 6 (Six) Months.     • docusate sodium (COLACE) 100 MG capsule Take 200 mg by mouth Every Night. Taking 2 capsules at night     • escitalopram (LEXAPRO) 10 MG tablet TAKE ONE TABLET BY MOUTH DAILY 90 tablet 1   • glucose blood (OneTouch Ultra) test strip 1 each by Other route Daily. DX: E11.9 100 each 1   • Lancet Devices (ONE TOUCH DELICA LANCING DEV) misc 1 each Daily. DX: E11.9 TYPE 2 DM 1 each 1   • Lancet Devices (OneTouch Delica Plus Lancing) misc 1 each Daily. USE ONETOUCH DELICA PLUS LANCING DEVICE ONCE DAILY FOR GLUCOSE MONITOR  DX: E11.9 1 each 11   • Magnesium 250 MG tablet Take 1 tablet by mouth Every Night.     • metFORMIN ER (GLUCOPHAGE-XR) 500 MG 24 hr tablet TAKE ONE TABLET BY MOUTH DAILY WITH BREAKFAST 90 tablet 1   • OneTouch Delica Lancets 33G misc Take 1 each by mouth Daily. DX: E11.9 TYPE 2  each 3   • vitamin B-12 (CYANOCOBALAMIN) 1000 MCG tablet Take 1 tablet by mouth Daily. 30 tablet 5       Review of Systems   Constitutional: Negative for diaphoresis and fever.   Respiratory: Negative for cough, shortness of breath and wheezing.    Cardiovascular: Negative for chest pain, palpitations and leg swelling.   Gastrointestinal: Positive for constipation ( controlled with stool softener). Negative for diarrhea.       Objective   Vitals:    01/19/22 0957   BP: 112/72   Pulse: 81   Resp: 16   Temp: 97.3 °F (36.3 °C)   SpO2: 98%      Wt Readings from Last 3 Encounters:   01/19/22 50.8 kg (112 lb)   11/30/21 50.8 kg (112 lb)   10/18/21 51.7 kg (114 lb)    Body mass index is 19.22 kg/m².      Physical Exam   Constitutional: She appears well-developed.   Cardiovascular: Normal rate, regular rhythm and normal heart  sounds. Exam reveals no gallop.   No murmur heard.  Pulmonary/Chest: Effort normal and breath sounds normal. No respiratory distress. She has no wheezes. She has no rales.   Abdominal: Soft. Normal appearance and bowel sounds are normal. She exhibits no mass. There is no abdominal tenderness. There is no guarding.   Neurological: She is alert.         Problems Addressed this Visit        Musculoskeletal and Injuries    Osteoporosis - Primary (Chronic)       Neuro    Cognitive dysfunction (Chronic)      Other Visit Diagnoses     Type 2 diabetes mellitus without complication, without long-term current use of insulin (HCC)        Relevant Orders    Hemoglobin A1c    Lipid Panel With / Chol / HDL Ratio    Medication management        Relevant Orders    CBC & Differential      Diagnoses       Codes Comments    Osteoporosis, unspecified osteoporosis type, unspecified pathological fracture presence    -  Primary ICD-10-CM: M81.0  ICD-9-CM: 733.00     Type 2 diabetes mellitus without complication, without long-term current use of insulin (HCC)     ICD-10-CM: E11.9  ICD-9-CM: 250.00     Cognitive dysfunction     ICD-10-CM: F09  ICD-9-CM: 294.9     Medication management     ICD-10-CM: Z79.899  ICD-9-CM: V58.69         Assessment/Plan   In for follow-up of diabetes and hyperlipidemia.  She had a stroke in May 2019 and developed diabetes mellitus shortly after that time.  She is having some troubles with memory.  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  Irregardless her medications look good.  She is on a statin, Metformin and aspirin.  Annual lab work due today January 2022 including CBC, lipids, A1c.  CMP is up-to-date as of November 2021.  She will need  lab work including glucose, A1c, and lipids every 3 months.  Gets Prolia shots every 6 months for osteoporosis.  Finances are tight so it may be difficult to get  neuropsychometric evaluation.  I suspect developing dementia.  We checked an MMSE at 38 unchanged from 30 up from 25 down from 28 down from 29 up from 25 down from 29 and Ryley is at 21 down from 26 up from 25 down from 26 up from 23 up from 20 down from 22 today. Annual wellness October 2021. Due for Shingrix.  Urine microalbumin today she is fasting today.  Recent eye checkup was done by Dr. Grey Childs.  We will schedule a mammogram.  On metformin and atorvastatin for diabetes and those are reviewed today.    The above information was reviewed again today 01/19/22.  It continues to be accurate as reflected above and is unchanged.  History, physical and review of systems all reviewed and are unchanged.  Medications were reviewed today and continue the current dosing.    PPE today includes face mask and eye shield.         Dragon disclaimer:   Much of this encounter note is an electronic transcription/translation of spoken language to printed text. The electronic translation of spoken language may permit erroneous, or at times, nonsensical words or phrases to be inadvertently transcribed; Although I have reviewed the note for such errors, some may still exist.

## 2022-01-20 LAB
BASOPHILS # BLD AUTO: 0.1 X10E3/UL (ref 0–0.2)
BASOPHILS NFR BLD AUTO: 1 %
CHOLEST SERPL-MCNC: 143 MG/DL (ref 100–199)
CHOLEST/HDLC SERPL: 2.3 RATIO (ref 0–4.4)
EOSINOPHIL # BLD AUTO: 0.3 X10E3/UL (ref 0–0.4)
EOSINOPHIL NFR BLD AUTO: 5 %
ERYTHROCYTE [DISTWIDTH] IN BLOOD BY AUTOMATED COUNT: 12.7 % (ref 11.7–15.4)
HBA1C MFR BLD: 6.2 % (ref 4.8–5.6)
HCT VFR BLD AUTO: 41.2 % (ref 34–46.6)
HDLC SERPL-MCNC: 62 MG/DL
HGB BLD-MCNC: 13.6 G/DL (ref 11.1–15.9)
IMM GRANULOCYTES # BLD AUTO: 0 X10E3/UL (ref 0–0.1)
IMM GRANULOCYTES NFR BLD AUTO: 0 %
LDLC SERPL CALC-MCNC: 68 MG/DL (ref 0–99)
LYMPHOCYTES # BLD AUTO: 1.2 X10E3/UL (ref 0.7–3.1)
LYMPHOCYTES NFR BLD AUTO: 20 %
MCH RBC QN AUTO: 29.7 PG (ref 26.6–33)
MCHC RBC AUTO-ENTMCNC: 33 G/DL (ref 31.5–35.7)
MCV RBC AUTO: 90 FL (ref 79–97)
MONOCYTES # BLD AUTO: 0.6 X10E3/UL (ref 0.1–0.9)
MONOCYTES NFR BLD AUTO: 10 %
NEUTROPHILS # BLD AUTO: 3.7 X10E3/UL (ref 1.4–7)
NEUTROPHILS NFR BLD AUTO: 64 %
PLATELET # BLD AUTO: 198 X10E3/UL (ref 150–450)
RBC # BLD AUTO: 4.58 X10E6/UL (ref 3.77–5.28)
TRIGL SERPL-MCNC: 60 MG/DL (ref 0–149)
VLDLC SERPL CALC-MCNC: 13 MG/DL (ref 5–40)
WBC # BLD AUTO: 5.8 X10E3/UL (ref 3.4–10.8)

## 2022-01-27 ENCOUNTER — TELEPHONE (OUTPATIENT)
Dept: INTERNAL MEDICINE | Facility: CLINIC | Age: 87
End: 2022-01-27

## 2022-01-27 NOTE — TELEPHONE ENCOUNTER
Caller: Cristy Wilde    Relationship: Self    Best call back number: [phone]     What is the best time to reach you: ANYTIME    Who are you requesting to speak with (clinical staff, provider,  specific staff member): CLINICAL    Do you know the name of the person who called:     What was the call regarding: PATIENT CALLING STATING SHE IS EXPERIENCING THE FOLLOWING SYMPTOMS DRAINAGE, BODY ACHES, SORE THROAT, SHE IS ONLY HAVING DRAINAGE, COUGH AND SNEEZING SHE WOULD LIKE TO KNOW IF THIS COULD BE COVID     Do you require a callback: YES

## 2022-01-28 NOTE — TELEPHONE ENCOUNTER
Called spoke with patient, gave patient [employer] help desk number to get set up, instructed to call office back to set up video visit today.

## 2022-02-01 ENCOUNTER — TELEPHONE (OUTPATIENT)
Dept: INTERNAL MEDICINE | Facility: CLINIC | Age: 87
End: 2022-02-01

## 2022-02-01 NOTE — TELEPHONE ENCOUNTER
"Caller: Cristy Wilde    Relationship to patient: Self    Best call back number: [phone]*    Date of positive COVID19 test: 1/30/22    COVID19 symptoms: COLD LIKE SYMPTOMS    Additional information or concerns: PATIENT CALLED AND STATED THAT SHE WENT TO [facility] ON 1/30/22, AND TESTED POSITIVE FOR COVID. THE PATIENT STATES THAT SHE IS HAVING \"COLD LIKE SYMPTOMS\", NO FEVER. THE PATIENT IS REQUESTING A CALL BACK TO ADVISE HER ON WHAT SHE NEEDS TO DO.    What is the patients preferred pharmacy: [facility] [address] [place] [address] [address] [facility] [address] - [phone]  - [phone]  [phone]          "

## 2022-04-12 ENCOUNTER — APPOINTMENT (OUTPATIENT)
Dept: MAMMOGRAPHY | Facility: HOSPITAL | Age: 87
End: 2022-04-12

## 2022-04-12 ENCOUNTER — OFFICE VISIT (OUTPATIENT)
Dept: INTERNAL MEDICINE | Facility: CLINIC | Age: 87
End: 2022-04-12

## 2022-04-12 VITALS
WEIGHT: 112 LBS | SYSTOLIC BLOOD PRESSURE: 128 MMHG | HEART RATE: 62 BPM | HEIGHT: 64 IN | RESPIRATION RATE: 16 BRPM | OXYGEN SATURATION: 98 % | DIASTOLIC BLOOD PRESSURE: 74 MMHG | TEMPERATURE: 97.8 F | BODY MASS INDEX: 19.12 KG/M2

## 2022-04-12 DIAGNOSIS — M81.0 OSTEOPOROSIS, UNSPECIFIED OSTEOPOROSIS TYPE, UNSPECIFIED PATHOLOGICAL FRACTURE PRESENCE: Chronic | ICD-10-CM

## 2022-04-12 DIAGNOSIS — I63.9 CEREBROVASCULAR ACCIDENT (CVA), UNSPECIFIED MECHANISM: Chronic | ICD-10-CM

## 2022-04-12 DIAGNOSIS — E55.9 VITAMIN D DEFICIENCY: ICD-10-CM

## 2022-04-12 DIAGNOSIS — E11.9 CONTROLLED TYPE 2 DIABETES MELLITUS WITHOUT COMPLICATION, WITHOUT LONG-TERM CURRENT USE OF INSULIN: Primary | Chronic | ICD-10-CM

## 2022-04-12 DIAGNOSIS — Z79.899 MEDICATION MANAGEMENT: ICD-10-CM

## 2022-04-12 PROCEDURE — 99214 OFFICE O/P EST MOD 30 MIN: CPT | Performed by: INTERNAL MEDICINE

## 2022-04-12 NOTE — PROGRESS NOTES
Subjective   Cristy Wilde is a 87 y.o. female.     Chief Complaint   Patient presents with   • Diabetes   • Osteoarthritis         Diabetes  She presents for her follow-up diabetic visit. She has type 2 diabetes mellitus. Her disease course has been stable. Pertinent negatives for diabetes include no chest pain. Diabetic complications include a CVA.   Osteoarthritis  Presents for follow-up visit. Pertinent negatives include no diarrhea. Her past medical history is significant for osteoarthritis.   Cerebrovascular Accident  This is a chronic problem. The current episode started more than 1 year ago. The problem occurs constantly. Pertinent negatives include no chest pain or coughing.   Hyperlipidemia  This is a chronic problem. The current episode started more than 1 year ago. The problem is controlled. Pertinent negatives include no chest pain or shortness of breath.   Dementia  This is a chronic problem. The current episode started more than 1 year ago. The problem occurs constantly. Pertinent negatives include no chest pain or coughing.   Stroke  This is a chronic problem. The current episode started more than 1 year ago. The problem occurs constantly. The problem has been unchanged. Pertinent negatives include no chest pain or coughing.   Osteoporosis  This is a chronic problem. The current episode started more than 1 year ago. The problem occurs constantly. Pertinent negatives include no chest pain or coughing.        The following portions of the patient's history were reviewed and updated as appropriate: allergies, current medications, past social history and problem list.    Outpatient Medications Marked as Taking for the 4/12/22 encounter (Office Visit) with Arben Wright MD   Medication Sig Dispense Refill   • aspirin 81 MG EC tablet Take 1 tablet by mouth Daily. 30 tablet 0   • atorvastatin (LIPITOR) 20 MG tablet TAKE ONE TABLET BY MOUTH DAILY 90 tablet 6   • Cholecalciferol (VITAMIN D3) 75  MCG (3000 UT) tablet Take 4,000 Units by mouth Daily.     • cycloSPORINE (RESTASIS) 0.05 % ophthalmic emulsion Administer 1 drop to both eyes 2 (Two) Times a Day.     • denosumab (PROLIA) 60 MG/ML solution syringe Inject 60 mg under the skin into the appropriate area as directed Every 6 (Six) Months.     • docusate sodium (COLACE) 100 MG capsule Take 200 mg by mouth Every Night. Taking 2 capsules at night     • escitalopram (LEXAPRO) 10 MG tablet TAKE ONE TABLET BY MOUTH DAILY 90 tablet 1   • glucose blood (OneTouch Ultra) test strip 1 each by Other route Daily. DX: E11.9 100 each 1   • Lancet Devices (ONE TOUCH DELICA LANCING DEV) misc 1 each Daily. DX: E11.9 TYPE 2 DM 1 each 1   • Lancet Devices (OneTouch Delica Plus Lancing) misc 1 each Daily. USE ONETOUCH DELICA PLUS LANCING DEVICE ONCE DAILY FOR GLUCOSE MONITOR  DX: E11.9 1 each 11   • Magnesium 250 MG tablet Take 1 tablet by mouth Every Night.     • metFORMIN ER (GLUCOPHAGE-XR) 500 MG 24 hr tablet TAKE ONE TABLET BY MOUTH DAILY WITH BREAKFAST 90 tablet 1   • OneTouch Delica Lancets 33G misc Take 1 each by mouth Daily. DX: E11.9 TYPE 2  each 3   • vitamin B-12 (CYANOCOBALAMIN) 1000 MCG tablet Take 1 tablet by mouth Daily. 30 tablet 5       Review of Systems   Respiratory: Negative for cough, shortness of breath and wheezing.    Cardiovascular: Negative for chest pain, palpitations and leg swelling.   Gastrointestinal: Negative for constipation ( controlled with stool softener) and diarrhea.       Objective   Vitals:    04/12/22 1026   BP: 128/74   Pulse: 62   Resp: 16   Temp: 97.8 °F (36.6 °C)   SpO2: 98%      Wt Readings from Last 3 Encounters:   04/12/22 50.8 kg (112 lb)   01/19/22 50.8 kg (112 lb)   11/30/21 50.8 kg (112 lb)    Body mass index is 19.22 kg/m².      Physical Exam   Constitutional: She appears well-developed.   Cardiovascular: Normal rate, regular rhythm and normal heart sounds. Exam reveals no gallop.   No murmur heard.  Pulmonary/Chest:  Effort normal and breath sounds normal. No respiratory distress. She has no wheezes. She has no rales.   Abdominal: Soft. Normal appearance and bowel sounds are normal. She exhibits no mass. There is no abdominal tenderness. There is no guarding.   Neurological: She is alert.         Problems Addressed this Visit        Endocrine and Metabolic    Controlled type 2 diabetes mellitus without complication, without long-term current use of insulin ([facility]) - Primary (Chronic)       Musculoskeletal and Injuries    Osteoporosis (Chronic)       Neuro    Stroke ([facility]) (Chronic)      Diagnoses       Codes Comments    Controlled type 2 diabetes mellitus without complication, without long-term current use of insulin ([facility])    -  Primary ICD-10-CM: E11.9  ICD-9-CM: 250.00     Osteoporosis, unspecified osteoporosis type, unspecified pathological fracture presence     ICD-10-CM: M81.0  ICD-9-CM: 733.00     Cerebrovascular accident (CVA), unspecified mechanism ([facility])     ICD-10-CM: I63.9  ICD-9-CM: 434.91         Assessment/Plan   In for follow-up of diabetes, hyperlipidemia, stroke and osteoporosis.  She had a stroke in May 2019 and developed diabetes mellitus shortly after that time.  She is having some troubles with memory.  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  Irregardless her medications look good.  She is on a Lipitor 20 mg daily for lipids and Metformin  mg daily for diabetes and those are reviewed today.  Also on aspirin.  Annual lab work due January 2022 including CBC, lipids, A1c.  CMP is up-to-date as of November 2021.  She will need  lab work including glucose, A1c, and lipids every 3 months.  She is currently on 2520 mg of calcium daily along with 5000 units of vitamin D daily.  We will need to check a vitamin D level today.  Cut calcium in half.  Gets Prolia shots every 6 months for osteoporosis.  Finances are  tight so it may be difficult to get neuropsychometric evaluation.  I suspect developing dementia.  We checked an MMSE at 29 down from 30 unchanged from 30 up from 25 down from 28 down from 29 up from 25 down from 29 and Girard is at 22 up from 21 down from 26 up from 25 down from 26 up from 23 up from 20 down from 22 today. Annual wellness October 2021. Due for Shingrix.  Recent eye checkup was done by Dr. Grey Childs.      The above information was reviewed again today 04/12/22.  It continues to be accurate as reflected above and is unchanged.  History, physical and review of systems all reviewed and are unchanged.  Medications were reviewed today and continue the current dosing.    PPE today includes face mask and eye shield.         Dragon disclaimer:   Much of this encounter note is an electronic transcription/translation of spoken language to printed text. The electronic translation of spoken language may permit erroneous, or at times, nonsensical words or phrases to be inadvertently transcribed; Although I have reviewed the note for such errors, some may still exist.                 Physical Exam  Constitutional:       Appearance: Normal appearance. She is well-developed.   Cardiovascular:      Rate and Rhythm: Normal rate and regular rhythm.      Heart sounds: Normal heart sounds. No murmur heard.    No gallop.   Pulmonary:      Effort: Pulmonary effort is normal. No respiratory distress.      Breath sounds: Normal breath sounds. No wheezing or rales.   Abdominal:      General: Bowel sounds are normal.      Palpations: Abdomen is soft. There is no mass.      Tenderness: There is no abdominal tenderness. There is no guarding.   Neurological:      Mental Status: She is alert.

## 2022-04-13 LAB
25(OH)D3+25(OH)D2 SERPL-MCNC: 106 NG/ML (ref 30–100)
CHOLEST SERPL-MCNC: 146 MG/DL (ref 100–199)
CHOLEST/HDLC SERPL: 2.3 RATIO (ref 0–4.4)
GLUCOSE SERPL-MCNC: 125 MG/DL (ref 65–99)
HBA1C MFR BLD: 6.5 % (ref 4.8–5.6)
HDLC SERPL-MCNC: 64 MG/DL
LDLC SERPL CALC-MCNC: 70 MG/DL (ref 0–99)
TRIGL SERPL-MCNC: 56 MG/DL (ref 0–149)
VLDLC SERPL CALC-MCNC: 12 MG/DL (ref 5–40)

## 2022-04-21 PROBLEM — I63.9 STROKE: Status: RESOLVED | Noted: 2019-08-15 | Resolved: 2022-04-21

## 2022-04-21 PROBLEM — Z86.73 HISTORY OF STROKE: Status: ACTIVE | Noted: 2022-04-21

## 2022-05-13 RX ORDER — ESCITALOPRAM OXALATE 10 MG/1
TABLET ORAL
Qty: 90 TABLET | Refills: 1 | Status: SHIPPED | OUTPATIENT
Start: 2022-05-13 | End: 2023-01-16

## 2022-05-17 DIAGNOSIS — Z79.899 ON STATIN THERAPY: ICD-10-CM

## 2022-05-17 DIAGNOSIS — I63.9 CEREBROVASCULAR ACCIDENT (CVA), UNSPECIFIED MECHANISM: ICD-10-CM

## 2022-05-17 RX ORDER — ATORVASTATIN CALCIUM 20 MG/1
20 TABLET, FILM COATED ORAL DAILY
Qty: 90 TABLET | Refills: 0 | Status: SHIPPED | OUTPATIENT
Start: 2022-05-17 | End: 2022-08-23 | Stop reason: SDUPTHER

## 2022-06-01 ENCOUNTER — INFUSION (OUTPATIENT)
Dept: ONCOLOGY | Facility: HOSPITAL | Age: 87
End: 2022-06-01

## 2022-06-01 ENCOUNTER — LAB (OUTPATIENT)
Dept: OTHER | Facility: HOSPITAL | Age: 87
End: 2022-06-01

## 2022-06-01 VITALS — BODY MASS INDEX: 19.22 KG/M2 | RESPIRATION RATE: 18 BRPM | HEIGHT: 64 IN | TEMPERATURE: 96.9 F

## 2022-06-01 DIAGNOSIS — M80.00XA AGE-RELATED OSTEOPOROSIS WITH CURRENT PATHOLOGICAL FRACTURE, INITIAL ENCOUNTER: Primary | ICD-10-CM

## 2022-06-01 LAB
ALBUMIN SERPL-MCNC: 3.9 G/DL (ref 3.5–5.2)
ALBUMIN/GLOB SERPL: 1.4 G/DL
ALP SERPL-CCNC: 74 U/L (ref 39–117)
ALT SERPL W P-5'-P-CCNC: 16 U/L (ref 1–33)
ANION GAP SERPL CALCULATED.3IONS-SCNC: 5.4 MMOL/L (ref 5–15)
AST SERPL-CCNC: 21 U/L (ref 1–32)
BILIRUB SERPL-MCNC: 0.6 MG/DL (ref 0–1.2)
BUN SERPL-MCNC: 29 MG/DL (ref 8–23)
BUN/CREAT SERPL: 37.7 (ref 7–25)
CALCIUM SPEC-SCNC: 9.6 MG/DL (ref 8.6–10.5)
CHLORIDE SERPL-SCNC: 101 MMOL/L (ref 98–107)
CO2 SERPL-SCNC: 30.6 MMOL/L (ref 22–29)
CREAT SERPL-MCNC: 0.77 MG/DL (ref 0.57–1)
EGFRCR SERPLBLD CKD-EPI 2021: 74.8 ML/MIN/1.73
GLOBULIN UR ELPH-MCNC: 2.7 GM/DL
GLUCOSE SERPL-MCNC: 135 MG/DL (ref 65–99)
MAGNESIUM SERPL-MCNC: 2.2 MG/DL (ref 1.6–2.4)
PHOSPHATE SERPL-MCNC: 3.1 MG/DL (ref 2.5–4.5)
POTASSIUM SERPL-SCNC: 4.5 MMOL/L (ref 3.5–5.2)
PROT SERPL-MCNC: 6.6 G/DL (ref 6–8.5)
SODIUM SERPL-SCNC: 137 MMOL/L (ref 136–145)

## 2022-06-01 PROCEDURE — 83735 ASSAY OF MAGNESIUM: CPT | Performed by: INTERNAL MEDICINE

## 2022-06-01 PROCEDURE — 96372 THER/PROPH/DIAG INJ SC/IM: CPT

## 2022-06-01 PROCEDURE — 80053 COMPREHEN METABOLIC PANEL: CPT | Performed by: INTERNAL MEDICINE

## 2022-06-01 PROCEDURE — 36415 COLL VENOUS BLD VENIPUNCTURE: CPT

## 2022-06-01 PROCEDURE — 84100 ASSAY OF PHOSPHORUS: CPT | Performed by: INTERNAL MEDICINE

## 2022-06-01 PROCEDURE — 25010000002 DENOSUMAB 60 MG/ML SOLUTION PREFILLED SYRINGE: Performed by: INTERNAL MEDICINE

## 2022-06-01 RX ADMIN — DENOSUMAB 60 MG: 60 INJECTION SUBCUTANEOUS at 14:00

## 2022-06-01 NOTE — NURSING NOTE
Arrived  for prolia injection. Indication and side effects reviewed. Denies recent dental work. Labs and medications verified. Prolia administered in left  arm without incidence. Instructed to call prescribing MD for any concerns or questions and instructed on how to schedule future appts.  Pt vu and discharged in stable condition.

## 2022-07-12 ENCOUNTER — OFFICE VISIT (OUTPATIENT)
Dept: INTERNAL MEDICINE | Facility: CLINIC | Age: 87
End: 2022-07-12

## 2022-07-12 ENCOUNTER — TELEPHONE (OUTPATIENT)
Dept: INTERNAL MEDICINE | Facility: CLINIC | Age: 87
End: 2022-07-12

## 2022-07-12 VITALS
BODY MASS INDEX: 19.12 KG/M2 | WEIGHT: 112 LBS | DIASTOLIC BLOOD PRESSURE: 68 MMHG | TEMPERATURE: 98 F | HEART RATE: 87 BPM | HEIGHT: 64 IN | SYSTOLIC BLOOD PRESSURE: 110 MMHG | RESPIRATION RATE: 16 BRPM | OXYGEN SATURATION: 98 %

## 2022-07-12 DIAGNOSIS — M81.0 OSTEOPOROSIS, UNSPECIFIED OSTEOPOROSIS TYPE, UNSPECIFIED PATHOLOGICAL FRACTURE PRESENCE: Chronic | ICD-10-CM

## 2022-07-12 DIAGNOSIS — E55.9 VITAMIN D DEFICIENCY: ICD-10-CM

## 2022-07-12 DIAGNOSIS — F09 COGNITIVE DYSFUNCTION: Chronic | ICD-10-CM

## 2022-07-12 DIAGNOSIS — E11.9 CONTROLLED TYPE 2 DIABETES MELLITUS WITHOUT COMPLICATION, WITHOUT LONG-TERM CURRENT USE OF INSULIN: Primary | Chronic | ICD-10-CM

## 2022-07-12 PROCEDURE — 99214 OFFICE O/P EST MOD 30 MIN: CPT | Performed by: INTERNAL MEDICINE

## 2022-07-12 NOTE — TELEPHONE ENCOUNTER
Caller: Cristy Wilde    Relationship to patient: Self    Best call back number: [phone]    Patient is needing: PATIENT IS REQUESTING AN UPDATED MEDICATION LIST. THE ONE SHE RECEIVED TODAY 7/12/22 HAS A FEW ERRORS AND IS CONFUSING. PATIENT IS REQUESTING TO PICK THIS UP TOMORROW 7/13, IF POSSIBLE. PLEASE ADVISE.

## 2022-07-12 NOTE — PROGRESS NOTES
Subjective   Cristy Wilde is a 87 y.o. female.     Chief Complaint   Patient presents with   • Hyperlipidemia   • Diabetes   • Mole on Back          Hyperlipidemia  This is a chronic problem. The current episode started more than 1 year ago. The problem is controlled. Pertinent negatives include no shortness of breath.   Diabetes  She presents for her follow-up diabetic visit. She has type 2 diabetes mellitus. Her disease course has been stable.   Osteoarthritis  Presents for follow-up visit. Pertinent negatives include no diarrhea. Her past medical history is significant for osteoarthritis.   Dementia  This is a chronic problem. The current episode started more than 1 year ago. The problem occurs constantly.   Stroke  This is a chronic problem. The current episode started more than 1 year ago. The problem occurs constantly. The problem has been unchanged.   Osteoporosis  This is a chronic problem. The current episode started more than 1 year ago. The problem occurs constantly.        The following portions of the patient's history were reviewed and updated as appropriate: allergies, current medications, past social history and problem list.    Outpatient Medications Marked as Taking for the 7/12/22 encounter (Office Visit) with Arben Wright MD   Medication Sig Dispense Refill   • aspirin 81 MG EC tablet Take 1 tablet by mouth Daily. 30 tablet 0   • atorvastatin (LIPITOR) 20 MG tablet Take 1 tablet by mouth Daily. 90 tablet 0   • Cholecalciferol (VITAMIN D3) 75 MCG (3000 UT) tablet Take 4,000 Units by mouth Daily.     • cycloSPORINE (RESTASIS) 0.05 % ophthalmic emulsion Administer 1 drop to both eyes 2 (Two) Times a Day.     • denosumab (PROLIA) 60 MG/ML solution syringe Inject 60 mg under the skin into the appropriate area as directed Every 6 (Six) Months.     • docusate sodium (COLACE) 100 MG capsule Take 200 mg by mouth Every Night. Taking 2 capsules at night     • escitalopram (LEXAPRO) 10 MG  tablet TAKE ONE TABLET BY MOUTH DAILY 90 tablet 1   • glucose blood (OneTouch Ultra) test strip 1 each by Other route Daily. DX: E11.9 100 each 1   • Lancet [employer] (ONE TOUCH DELICA LANCING DEV) misc 1 each Daily. DX: E11.9 TYPE 2 DM 1 each 1   • Lancet Devices (OneTouch Delica Plus Lancing) misc 1 each Daily. USE ONETOUCH DELICA PLUS LANCING DEVICE ONCE DAILY FOR GLUCOSE MONITOR  DX: E11.9 1 each 11   • Magnesium 250 MG tablet Take 1 tablet by mouth Every Night.     • metFORMIN ER (GLUCOPHAGE-XR) 500 MG 24 hr tablet TAKE ONE TABLET BY MOUTH DAILY WITH BREAKFAST 90 tablet 1   • OneTouch Delica Lancets 33G misc Take 1 each by mouth Daily. DX: E11.9 TYPE 2  each 3   • vitamin B-12 (CYANOCOBALAMIN) 1000 MCG tablet Take 1 tablet by mouth Daily. 30 tablet 5       Review of Systems   Respiratory: Negative for shortness of breath and wheezing.    Cardiovascular: Negative for palpitations and leg swelling.   Gastrointestinal: Negative for constipation ( controlled with stool softener) and diarrhea.       Objective   Vitals:    07/12/22 1001   BP: 110/68   Pulse: 87   Resp: 16   Temp: 98 °F (36.7 °C)   SpO2: 98%      Wt Readings from Last 3 Encounters:   07/12/22 50.8 kg (112 lb)   04/12/22 50.8 kg (112 lb)   01/19/22 50.8 kg (112 lb)    Body mass index is 19.22 kg/m².      Physical Exam   Constitutional: She appears well-developed.   Cardiovascular: Normal rate, regular rhythm and normal heart sounds. Exam reveals no gallop.   No murmur heard.  Pulmonary/Chest: Effort normal and breath sounds normal. No respiratory distress. She has no wheezes. She has no rales.   Abdominal: Soft. Normal appearance and bowel sounds are normal. She exhibits no mass. There is no abdominal tenderness. There is no guarding.   Neurological: She is alert.         Problems Addressed this Visit        Endocrine and Metabolic    Controlled type 2 diabetes mellitus without complication, without long-term current use of insulin ([facility]) - Primary  (Chronic)       Musculoskeletal and Injuries    Osteoporosis (Chronic)       Neuro    Cognitive dysfunction (Chronic)      Diagnoses       Codes Comments    Controlled type 2 diabetes mellitus without complication, without long-term current use of insulin ([facility])    -  Primary ICD-10-CM: E11.9  ICD-9-CM: 250.00     Osteoporosis, unspecified osteoporosis type, unspecified pathological fracture presence     ICD-10-CM: M81.0  ICD-9-CM: 733.00     Cognitive dysfunction     ICD-10-CM: F09  ICD-9-CM: 294.9         Assessment & Plan   In for follow-up of diabetes, hyperlipidemia, stroke and osteoporosis.  She had a stroke in May 2019 and developed diabetes mellitus shortly after that time.  She is having some troubles with memory.  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  Irregardless her medications look good.  She is on a Lipitor 20 mg daily for lipids and Metformin  mg daily for diabetes and those are reviewed today.  Also on aspirin.  Annual lab work due January 2023 including CBC, CMP, lipids, A1c.  She will need  lab work including glucose, A1c, and lipids today and every 3 months.  Recheck vitamin D level October 2022.  She is currently on 2520 mg of calcium daily along with 9,000 units of vitamin D daily until about 2 weeks ago when she cut down to 2000 units daily.  We will switch to 3000 units daily of vitamin D3.  Gets Prolia shots every 6 months for osteoporosis.  Finances are tight so it may be difficult to get neuropsychometric evaluation.  I suspect developing dementia.  We previously checked an MMSE at 29 down from 30 unchanged from 30 up from 25 down from 28 down from 29 up from 25 down from 29 and Ryley is at 22 up from 21 down from 26 up from 25 down from 26 up from 23 up from 20 down from 22 today. Annual wellness October 2021.  Due for Shingrix.  Recent eye checkup was done by Dr. Grey Childs.   Due for her second COVID booster.      The above information was reviewed again today 07/12/22.  It continues to be accurate as reflected above and is unchanged.  History, physical and review of systems all reviewed and are unchanged.  Medications were reviewed today and continue the current dosing.    PPE today includes face mask and eye shield.         Dragon disclaimer:   Much of this encounter note is an electronic transcription/translation of spoken language to printed text. The electronic translation of spoken language may permit erroneous, or at times, nonsensical words or phrases to be inadvertently transcribed; Although I have reviewed the note for such errors, some may still exist.

## 2022-07-13 ENCOUNTER — HOSPITAL ENCOUNTER (OUTPATIENT)
Dept: MAMMOGRAPHY | Facility: HOSPITAL | Age: 87
Discharge: HOME | End: 2022-07-13
Admitting: INTERNAL MEDICINE

## 2022-07-13 DIAGNOSIS — Z12.31 SCREENING MAMMOGRAM, ENCOUNTER FOR: ICD-10-CM

## 2022-07-13 PROCEDURE — 77063 BREAST TOMOSYNTHESIS BI: CPT

## 2022-07-13 PROCEDURE — 77067 SCR MAMMO BI INCL CAD: CPT

## 2022-08-22 RX ORDER — METFORMIN HYDROCHLORIDE 500 MG/1
TABLET, EXTENDED RELEASE ORAL
Qty: 90 TABLET | Refills: 1 | Status: SHIPPED | OUTPATIENT
Start: 2022-08-22

## 2022-08-23 DIAGNOSIS — Z79.899 ON STATIN THERAPY: ICD-10-CM

## 2022-08-23 DIAGNOSIS — I63.9 CEREBROVASCULAR ACCIDENT (CVA), UNSPECIFIED MECHANISM: ICD-10-CM

## 2022-08-23 RX ORDER — ATORVASTATIN CALCIUM 20 MG/1
20 TABLET, FILM COATED ORAL DAILY
Qty: 90 TABLET | Refills: 0 | Status: SHIPPED | OUTPATIENT
Start: 2022-08-23 | End: 2023-02-02

## 2022-08-30 ENCOUNTER — TELEPHONE (OUTPATIENT)
Dept: INTERNAL MEDICINE | Facility: CLINIC | Age: 87
End: 2022-08-30

## 2022-08-30 NOTE — TELEPHONE ENCOUNTER
Caller: Cristy Wilde    Relationship to patient: Self    Best call back number:     Date of exposure:     Date of positive COVID19 test: 08/29/22    Date if possible COVID19 exposure:     COVID19 symptoms: RUNNY NOSE, BODY ACHES, HEADACHE, VOICE HORSE    Date of initial quarantine:     Additional information or concerns: PATIENT IS CALLING IN STATING THAT SHE HAS TESTED POSITIVE FOR COVID AGAIN ON 08/29/22.  SHE SAYS SHE WENT TO THE [facility]ER[facility] AND WAS TOLD TO CONTACT DR MENA TO SEE IF HE WANTS THE PATIENT PUT ON STEROIDS.  THE PATIENT SAYS THAT THE [facility] DOES NOT HAVE RECORD OF HER FIRST COVID TEST AND SHE WANTS TO KNOW IF DR MENA HAS THIS INFORMATION.     What is the patients preferred pharmacy: [facility]  [address] [place]  [phone]

## 2022-09-09 ENCOUNTER — OFFICE VISIT (OUTPATIENT)
Dept: INTERNAL MEDICINE | Facility: CLINIC | Age: 87
End: 2022-09-09

## 2022-09-09 VITALS
HEART RATE: 86 BPM | DIASTOLIC BLOOD PRESSURE: 52 MMHG | TEMPERATURE: 97.7 F | OXYGEN SATURATION: 99 % | RESPIRATION RATE: 16 BRPM | SYSTOLIC BLOOD PRESSURE: 96 MMHG | HEIGHT: 64 IN | BODY MASS INDEX: 18.95 KG/M2 | WEIGHT: 111 LBS

## 2022-09-09 DIAGNOSIS — H61.21 IMPACTED CERUMEN OF RIGHT EAR: Primary | ICD-10-CM

## 2022-09-09 PROCEDURE — 99212 OFFICE O/P EST SF 10 MIN: CPT | Performed by: INTERNAL MEDICINE

## 2022-09-09 NOTE — PROGRESS NOTES
Subjective   Cristy Wilde is a 87 y.o. female.     Chief Complaint   Patient presents with   • Cerumen Impaction         Decreased hearing right ear.  Hearing aid company thought that she had wax in the ear.       The following portions of the patient's history were reviewed and updated as appropriate: allergies, current medications, past social history and problem list.    Outpatient Medications Marked as Taking for the 9/9/22 encounter (Office Visit) with Arben Wright MD   Medication Sig Dispense Refill   • aspirin 81 MG EC tablet Take 1 tablet by mouth Daily. 30 tablet 0   • atorvastatin (LIPITOR) 20 MG tablet Take 1 tablet by mouth Daily. 90 tablet 0   • Cholecalciferol (VITAMIN D3) 75 MCG (3000 UT) tablet Take 3,000 Units by mouth Daily.     • cycloSPORINE (RESTASIS) 0.05 % ophthalmic emulsion Administer 1 drop to both eyes 2 (Two) Times a Day.     • denosumab (PROLIA) 60 MG/ML solution syringe Inject 60 mg under the skin into the appropriate area as directed Every 6 (Six) Months.     • docusate sodium (COLACE) 100 MG capsule Take 200 mg by mouth Every Night. Taking 2 capsules at night     • escitalopram (LEXAPRO) 10 MG tablet TAKE ONE TABLET BY MOUTH DAILY 90 tablet 1   • glucose blood (OneTouch Ultra) test strip 1 each by Other route Daily. DX: E11.9 100 each 1   • Lancet Devices (ONE TOUCH DELICA LANCING DEV) misc 1 each Daily. DX: E11.9 TYPE 2 DM 1 each 1   • Lancet Devices (OneTouch Delica Plus Lancing) misc 1 each Daily. USE ONETOUCH DELICA PLUS LANCING DEVICE ONCE DAILY FOR GLUCOSE MONITOR  DX: E11.9 1 each 11   • Magnesium 250 MG tablet Take 1 tablet by mouth Every Night.     • metFORMIN ER (GLUCOPHAGE-XR) 500 MG 24 hr tablet TAKE ONE TABLET BY MOUTH DAILY WITH BREAKFAST 90 tablet 1   • vitamin B-12 (CYANOCOBALAMIN) 1000 MCG tablet Take 1 tablet by mouth Daily. 30 tablet 5       Review of Systems   HENT: Positive for hearing loss. Negative for ear pain.        Objective   Vitals:     09/09/22 1337   BP: 96/52   Pulse: 86   Resp: 16   Temp: 97.7 °F (36.5 °C)   SpO2: 99%      Wt Readings from Last 3 Encounters:   09/09/22 50.3 kg (111 lb)   07/12/22 50.8 kg (112 lb)   04/12/22 50.8 kg (112 lb)    Body mass index is 19.04 kg/m².      Physical Exam  Constitutional:       Appearance: Normal appearance.   HENT:      Right Ear: Tympanic membrane, ear canal and external ear normal. There is impacted cerumen.      Left Ear: Tympanic membrane, ear canal and external ear normal.   Neurological:      Mental Status: She is alert.           Problems Addressed this Visit    None     Visit Diagnoses     Impacted cerumen of right ear    -  Primary      Diagnoses       Codes Comments    Impacted cerumen of right ear    -  Primary ICD-10-CM: H61.21  ICD-9-CM: 380.4         Assessment & Plan   In with wax impaction right ear.  We pretreated with peroxide for 10 minutes and then irrigated the ear completely free.  Follow-up exam was perfectly normal.    The above information was reviewed again today 09/09/22.  It continues to be accurate as reflected above and is unchanged.  History, physical and review of systems all reviewed and are unchanged.  Medications were reviewed today and continue the current dosing.    PPE today includes face mask and eye shield.               Dragon disclaimer:   Much of this encounter note is an electronic transcription/translation of spoken language to printed text. The electronic translation of spoken language may permit erroneous, or at times, nonsensical words or phrases to be inadvertently transcribed; Although I have reviewed the note for such errors, some may still exist.

## 2022-10-19 ENCOUNTER — OFFICE VISIT (OUTPATIENT)
Dept: INTERNAL MEDICINE | Facility: CLINIC | Age: 87
End: 2022-10-19

## 2022-10-19 VITALS
SYSTOLIC BLOOD PRESSURE: 100 MMHG | DIASTOLIC BLOOD PRESSURE: 60 MMHG | HEIGHT: 64 IN | WEIGHT: 111.2 LBS | BODY MASS INDEX: 18.98 KG/M2 | RESPIRATION RATE: 16 BRPM | OXYGEN SATURATION: 99 % | TEMPERATURE: 96.3 F | HEART RATE: 82 BPM

## 2022-10-19 DIAGNOSIS — F09 COGNITIVE DYSFUNCTION: Chronic | ICD-10-CM

## 2022-10-19 DIAGNOSIS — Z23 NEED FOR VACCINATION: Primary | ICD-10-CM

## 2022-10-19 DIAGNOSIS — E55.9 VITAMIN D DEFICIENCY: ICD-10-CM

## 2022-10-19 DIAGNOSIS — E11.9 CONTROLLED TYPE 2 DIABETES MELLITUS WITHOUT COMPLICATION, WITHOUT LONG-TERM CURRENT USE OF INSULIN: Chronic | ICD-10-CM

## 2022-10-19 PROBLEM — M81.0 OSTEOPOROSIS: Chronic | Status: ACTIVE | Noted: 2018-02-14

## 2022-10-19 LAB
CLARITY, POC: CLEAR
COLOR UR: YELLOW
EXPIRATION DATE: NORMAL
GLUCOSE UR STRIP-MCNC: NEGATIVE MG/DL
Lab: NORMAL
PH UR: 7 [PH] (ref 5–8)
PROT UR STRIP-MCNC: NEGATIVE MG/DL
RBC # UR STRIP: NEGATIVE /UL
SP GR UR: 1.01 (ref 1–1.03)

## 2022-10-19 PROCEDURE — 99214 OFFICE O/P EST MOD 30 MIN: CPT | Performed by: INTERNAL MEDICINE

## 2022-10-19 PROCEDURE — 90662 IIV NO PRSV INCREASED AG IM: CPT | Performed by: INTERNAL MEDICINE

## 2022-10-19 PROCEDURE — G0008 ADMIN INFLUENZA VIRUS VAC: HCPCS | Performed by: INTERNAL MEDICINE

## 2022-10-19 PROCEDURE — 81003 URINALYSIS AUTO W/O SCOPE: CPT | Performed by: INTERNAL MEDICINE

## 2022-10-19 NOTE — PROGRESS NOTES
Subjective   Cristy Wilde is a 87 y.o. female.     Chief Complaint   Patient presents with   • Diabetes   • Hyperlipidemia   • Osteoporosis   • Stroke         Diabetes  She presents for her follow-up diabetic visit. She has type 2 diabetes mellitus. Her disease course has been stable. Pertinent negatives for diabetes include no chest pain. Diabetic complications include a CVA.   Hyperlipidemia  This is a chronic problem. The current episode started more than 1 year ago. The problem is controlled. Pertinent negatives include no chest pain or shortness of breath.   Osteoporosis  This is a chronic problem. The current episode started more than 1 year ago. The problem occurs constantly. Pertinent negatives include no chest pain.   Stroke  This is a chronic problem. The current episode started more than 1 year ago. The problem occurs constantly. The problem has been unchanged. Pertinent negatives include no chest pain.   Osteoarthritis  Presents for follow-up visit. Pertinent negatives include no diarrhea. Her past medical history is significant for osteoarthritis.   Dementia  This is a chronic problem. The current episode started more than 1 year ago. The problem occurs constantly. Pertinent negatives include no chest pain.        The following portions of the patient's history were reviewed and updated as appropriate: allergies, current medications, past social history and problem list.    Outpatient Medications Marked as Taking for the 10/19/22 encounter (Office Visit) with Arben Wright MD   Medication Sig Dispense Refill   • aspirin 81 MG EC tablet Take 1 tablet by mouth Daily. 30 tablet 0   • atorvastatin (LIPITOR) 20 MG tablet Take 1 tablet by mouth Daily. 90 tablet 0   • Cholecalciferol (VITAMIN D3) 75 MCG (3000 UT) tablet Take 3,000 Units by mouth Daily.     • cycloSPORINE (RESTASIS) 0.05 % ophthalmic emulsion Administer 1 drop to both eyes 2 (Two) Times a Day.     • denosumab (PROLIA) 60 MG/ML  solution syringe Inject 60 mg under the skin into the appropriate area as directed Every 6 (Six) Months.     • docusate sodium (COLACE) 100 MG capsule Take 200 mg by mouth Every Night. Taking 2 capsules at night     • escitalopram (LEXAPRO) 10 MG tablet TAKE ONE TABLET BY MOUTH DAILY 90 tablet 1   • glucose blood (OneTouch Ultra) test strip 1 each by Other route Daily. DX: E11.9 100 each 1   • Lancet [employer] (ONE TOUCH DELICA LANCING DEV) misc 1 each Daily. DX: E11.9 TYPE 2 DM 1 each 1   • Lancet Devices (OneTouch Delica Plus Lancing) misc 1 each Daily. USE ONETOUCH DELICA PLUS LANCING DEVICE ONCE DAILY FOR GLUCOSE MONITOR  DX: E11.9 1 each 11   • Magnesium 250 MG tablet Take 1 tablet by mouth Every Night.     • metFORMIN ER (GLUCOPHAGE-XR) 500 MG 24 hr tablet TAKE ONE TABLET BY MOUTH DAILY WITH BREAKFAST 90 tablet 1   • vitamin B-12 (CYANOCOBALAMIN) 1000 MCG tablet Take 1 tablet by mouth Daily. 30 tablet 5       Review of Systems   Respiratory: Negative for shortness of breath and wheezing.    Cardiovascular: Negative for chest pain, palpitations and leg swelling.   Gastrointestinal: Negative for constipation ( controlled with stool softener) and diarrhea.       Objective   Vitals:    10/19/22 0944   BP: 100/60   Pulse: 82   Resp: 16   Temp: 96.3 °F (35.7 °C)   SpO2: 99%      Wt Readings from Last 3 Encounters:   10/19/22 50.4 kg (111 lb 3.2 oz)   09/09/22 50.3 kg (111 lb)   07/12/22 50.8 kg (112 lb)    Body mass index is 19.08 kg/m².      Physical Exam   Constitutional: She appears well-developed.   Cardiovascular: Normal rate, regular rhythm and normal heart sounds. Exam reveals no gallop.   No murmur heard.  Pulmonary/Chest: Effort normal and breath sounds normal. No respiratory distress. She has no wheezes. She has no rales.   Abdominal: Soft. Normal appearance and bowel sounds are normal. She exhibits no mass. There is no abdominal tenderness. There is no guarding.   Neurological: She is alert.          Problems Addressed this Visit        Endocrine and Metabolic    Controlled type 2 diabetes mellitus without complication, without long-term current use of insulin (HCC) (Chronic)       Neuro    Cognitive dysfunction (Chronic)   Other Visit Diagnoses     Need for vaccination    -  Primary    Relevant Orders    Fluzone High-Dose 65+yrs ([id number]) (Completed)      Diagnoses       Codes Comments    Need for vaccination    -  Primary ICD-10-CM: Z23  ICD-9-CM: V05.9     Controlled type 2 diabetes mellitus without complication, without long-term current use of insulin (HCC)     ICD-10-CM: E11.9  ICD-9-CM: 250.00     Cognitive dysfunction     ICD-10-CM: F09  ICD-9-CM: 294.9         Assessment & Plan   In for follow-up of diabetes mellitus, hyperlipidemia, stroke and osteoporosis today October 2022.  She had a stroke in May 2019 and developed diabetes mellitus shortly after that time.  She is having some troubles with memory.  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  Irregardless her medications look good.  Did get lost on the way to the office today but thinks is related to a rerouting of traffic.  She is on a Lipitor 20 mg daily for lipids and Metformin  mg daily for diabetes and those are reviewed today.  Also on aspirin.  Annual lab work due January 2023 including CBC, CMP, lipids, A1c.  She will need  lab work including glucose, A1c, and lipids today and every 3 months.  Recheck vitamin D level today October 2022.  She is currently on 2520 mg of calcium daily along with 3,000 units of vitamin D daily.  Gets Prolia shots every 6 months for osteoporosis.  Finances are tight so it may be difficult to get neuropsychometric evaluation.  I suspect developing dementia.  We checked an MMSE at 24 down from 29 down from 30 unchanged from 30 up from 25 down from 28 down from 29 up from 25 down from 29 and [place]  at 19 down from 22 up from 21 down from 26 up from 25 down from 26 up from 23 up from 20 down from 22 today. Annual wellness October 2021.  Due for Shingrix.  Recent eye checkup was done by Dr. Grey Childs.  Flu shot updated today.  She is undecided about COVID booster.  She is fasting today.      The above information was reviewed again today 10/19/22.  It continues to be accurate as reflected above and is unchanged.  History, physical and review of systems all reviewed and are unchanged.  Medications were reviewed today and continue the current dosing.    PPE today includes face mask and eye shield.         Dragon disclaimer:   Much of this encounter note is an electronic transcription/translation of spoken language to printed text. The electronic translation of spoken language may permit erroneous, or at times, nonsensical words or phrases to be inadvertently transcribed; Although I have reviewed the note for such errors, some may still exist.

## 2022-10-20 LAB
25(OH)D3+25(OH)D2 SERPL-MCNC: 67.4 NG/ML (ref 30–100)
CHOLEST SERPL-MCNC: 162 MG/DL (ref 0–200)
CHOLEST/HDLC SERPL: 2.13 {RATIO}
GLUCOSE SERPL-MCNC: 133 MG/DL (ref 65–99)
HBA1C MFR BLD: 6.6 % (ref 4.8–5.6)
HDLC SERPL-MCNC: 76 MG/DL (ref 40–60)
LDLC SERPL CALC-MCNC: 75 MG/DL (ref 0–100)
TRIGL SERPL-MCNC: 56 MG/DL (ref 0–150)
VLDLC SERPL CALC-MCNC: 11 MG/DL (ref 5–40)

## 2022-10-26 ENCOUNTER — OFFICE VISIT (OUTPATIENT)
Dept: INTERNAL MEDICINE | Facility: CLINIC | Age: 87
End: 2022-10-26

## 2022-10-26 VITALS
SYSTOLIC BLOOD PRESSURE: 122 MMHG | WEIGHT: 113.6 LBS | BODY MASS INDEX: 19.39 KG/M2 | HEIGHT: 64 IN | TEMPERATURE: 97.6 F | DIASTOLIC BLOOD PRESSURE: 62 MMHG | HEART RATE: 83 BPM | OXYGEN SATURATION: 98 % | RESPIRATION RATE: 16 BRPM

## 2022-10-26 DIAGNOSIS — Z00.00 ENCOUNTER FOR ANNUAL WELLNESS EXAM IN MEDICARE PATIENT: Primary | ICD-10-CM

## 2022-10-26 PROCEDURE — 1126F AMNT PAIN NOTED NONE PRSNT: CPT | Performed by: INTERNAL MEDICINE

## 2022-10-26 PROCEDURE — G0439 PPPS, SUBSEQ VISIT: HCPCS | Performed by: INTERNAL MEDICINE

## 2022-10-26 PROCEDURE — 1159F MED LIST DOCD IN RCRD: CPT | Performed by: INTERNAL MEDICINE

## 2022-10-26 PROCEDURE — 1160F RVW MEDS BY RX/DR IN RCRD: CPT | Performed by: INTERNAL MEDICINE

## 2022-10-26 PROCEDURE — 1170F FXNL STATUS ASSESSED: CPT | Performed by: INTERNAL MEDICINE

## 2022-10-26 NOTE — PROGRESS NOTES
The ABCs of the Annual Wellness Visit  Subsequent Medicare Wellness Visit    Chief Complaint   Patient presents with   • Medicare Wellness-subsequent      Subjective    History of Present Illness:  Cristy Wilde is a 87 y.o. female who presents for a Subsequent Medicare Wellness Visit.    The following portions of the patient's history were reviewed and   updated as appropriate: allergies, current medications, past family history, past medical history, past social history, past surgical history and problem list.    Compared to one year ago, the patient feels her physical   health is the same.    Compared to one year ago, the patient feels her mental   health is the same.    Recent Hospitalizations:  She was not admitted to the hospital during the last year.       Current Medical Providers:  Patient Care Team:  Arben Wright MD as PCP - General (Internal Medicine)  Jerardo Kaur MD as PCP - Family Medicine  Albino Childs MD as Consulting Physician (Ophthalmology)  MAE Davis MD as Consulting Physician (Ophthalmology)  Arben Wright MD as Referring Physician (Internal Medicine)    Outpatient Medications Prior to Visit   Medication Sig Dispense Refill   • aspirin 81 MG EC tablet Take 1 tablet by mouth Daily. 30 tablet 0   • atorvastatin (LIPITOR) 20 MG tablet Take 1 tablet by mouth Daily. 90 tablet 0   • Cholecalciferol (VITAMIN D3) 75 MCG (3000 UT) tablet Take 3,000 Units by mouth Daily.     • cycloSPORINE (RESTASIS) 0.05 % ophthalmic emulsion Administer 1 drop to both eyes 2 (Two) Times a Day.     • denosumab (PROLIA) 60 MG/ML solution syringe Inject 60 mg under the skin into the appropriate area as directed Every 6 (Six) Months.     • docusate sodium (COLACE) 100 MG capsule Take 200 mg by mouth Every Night. Taking 2 capsules at night     • escitalopram (LEXAPRO) 10 MG tablet TAKE ONE TABLET BY MOUTH DAILY 90 tablet 1   • glucose blood (OneTouch Ultra) test strip 1 each by Other route Daily.  "DX: E11.9 100 each 1   • Lanc[employer] (ONE TOUCH DELICA LANCING DEV) misc 1 each Daily. DX: E11.9 TYPE 2 DM 1 each 1   • Lanc[employer] Devices (OneTouch Delica Plus Lancing) misc 1 each Daily. USE ONETOUCH DELICA PLUS LANCING DEVICE ONCE DAILY FOR GLUCOSE MONITOR  DX: E11.9 1 each 11   • Magnesium 250 MG tablet Take 1 tablet by mouth Every Night.     • metFORMIN ER (GLUCOPHAGE-XR) 500 MG 24 hr tablet TAKE ONE TABLET BY MOUTH DAILY WITH BREAKFAST 90 tablet 1   • vitamin B-12 (CYANOCOBALAMIN) 1000 MCG tablet Take 1 tablet by mouth Daily. 30 tablet 5     No facility-administered medications prior to visit.       No opioid medication identified on active medication list. I have reviewed chart for other potential  high risk medication/s and harmful drug interactions in the elderly.          Aspirin is on active medication list. Aspirin use is indicated based on review of current medical condition/s. Pros and cons of this therapy have been discussed today. Benefits of this medication outweigh potential harm.  Patient has been encouraged to continue taking this medication.  .      Patient Active Problem List   Diagnosis   • Chronic rhinitis   • Dry eyes   • Hearing loss   • Osteoporosis   • Adjustment disorder with mixed anxiety and depressed mood   • Onychomycosis   • Controlled type 2 diabetes mellitus without complication, without long-term current use of insulin (HCC)   • Pruritus   • Stress incontinence in female   • Xerosis of skin   • Chronic midline low back pain without sciatica   • Osteoporosis   • Cognitive dysfunction   • History of stroke     Advance Care Planning  Advance Directive is not on file.  ACP discussion was held with the patient during this visit. Patient has an advance directive (not in EMR), copy requested.          Objective    Vitals:    10/26/22 1015   BP: 122/62   Pulse: 83   Resp: 16   Temp: 97.6 °F (36.4 °C)   TempSrc: Oral   SpO2: 98%   Weight: 51.5 kg (113 lb 9.6 oz)   Height: 162.6 cm (64.02\") " "  PainSc: 0-No pain     Estimated body mass index is 19.49 kg/m² as calculated from the following:    Height as of this encounter: 162.6 cm (64.02\").    Weight as of this encounter: 51.5 kg (113 lb 9.6 oz).    BMI is within normal parameters. No other follow-up for BMI required.      Does the patient have evidence of cognitive impairment? No    Physical Exam  Lab Results   Component Value Date    CHLPL 162 10/19/2022    TRIG 56 10/19/2022    HDL 76 (H) 10/19/2022    LDL 75 10/19/2022    VLDL 11 10/19/2022    HGBA1C 6.60 (H) 10/19/2022            HEALTH RISK ASSESSMENT    Smoking Status:  Social History     Tobacco Use   Smoking Status Never   Smokeless Tobacco Never     Alcohol Consumption:  Social History     Substance and Sexual Activity   Alcohol Use Not Currently    Comment: quit in 2018     Fall Risk Screen:    WOJCIECHADI Fall Risk Assessment was completed, and patient is at MODERATE risk for falls. Assessment completed on:10/26/2022    Depression Screening:  PHQ-2/PHQ-9 Depression Screening 10/26/2022   Retired PHQ-9 Total Score -   Retired Total Score -   Little Interest or Pleasure in Doing Things 0-->not at all   Feeling Down, Depressed or Hopeless 0-->not at all   Trouble Falling or Staying Asleep, or Sleeping Too Much 0-->not at all   Feeling Tired or Having Little Energy 0-->not at all   Poor Appetite or Overeating 0-->not at all   Feeling Bad about Yourself - or that You are a Failure or Have Let Yourself or Your Family Down 1-->several days   Trouble Concentrating on Things, Such as Reading the Newspaper or Watching Television 0-->not at all   Moving or Speaking So Slowly that Other People Could Have Noticed? Or the Opposite - Being So Fidgety 3-->nearly every day   Thoughts that You Would be Better Off Dead or of Hurting Yourself in Some Way 0-->not at all   PHQ-9: Brief Depression Severity Measure Score 4   If You Checked Off Any Problems, How Difficult Have These Problems Made It For You to Do Your " Work, Take Care of Things at Home, or Get Along with Other People? not difficult at all       Health Habits and Functional and Cognitive Screening:  Functional & Cognitive Status 10/26/2022   Do you have difficulty preparing food and eating? No   Do you have difficulty bathing yourself, getting dressed or grooming yourself? No   Do you have difficulty using the toilet? No   Do you have difficulty moving around from place to place? No   Do you have trouble with steps or getting out of a bed or a chair? No   Current Diet Well Balanced Diet   Dental Exam Not up to date   Eye Exam Up to date   Exercise (times per week) 7 times per week   Current Exercises Include Walking   Current Exercise Activities Include -   Do you need help using the phone?  No   Are you deaf or do you have serious difficulty hearing?  Yes   Do you need help with transportation? No   Do you need help shopping? No   Do you need help preparing meals?  No   Do you need help with housework?  No   Do you need help with laundry? No   Do you need help taking your medications? No   Do you need help managing money? No   Do you ever drive or ride in a car without wearing a seat belt? No   Have you felt unusual stress, anger or loneliness in the last month? Yes   Who do you live with? Alone   If you need help, do you have trouble finding someone available to you? No   Have you been bothered in the last four weeks by sexual problems? No   Do you have difficulty concentrating, remembering or making decisions? Yes       Age-appropriate Screening Schedule:  Refer to the list below for future screening recommendations based on patient's age, sex and/or medical conditions. Orders for these recommended tests are listed in the plan section. The patient has been provided with a written plan.    Health Maintenance   Topic Date Due   • ZOSTER VACCINE (2 of 3) 02/26/2010   • DIABETIC EYE EXAM  01/11/2023   • URINE MICROALBUMIN  01/19/2023   • HEMOGLOBIN A1C  04/19/2023    • DXA SCAN  06/15/2023   • MAMMOGRAM  07/13/2024   • TDAP/TD VACCINES (3 - Td or Tdap) 07/10/2030   • INFLUENZA VACCINE  Completed              Assessment & Plan   CMS Preventative Services Quick Reference  Risk Factors Identified During Encounter  Hearing Problem  Inactivity/Sedentary  The above risks/problems have been discussed with the patient.  Follow up actions/plans if indicated are seen below in the Assessment/Plan Section.  Pertinent information has been shared with the patient in the After Visit Summary.    Diagnoses and all orders for this visit:    1. Encounter for annual wellness exam in Medicare patient (Primary)        Follow Up:   No follow-ups on file.     An After Visit Summary and PPPS were made available to the patient.          I spent 15 minutes caring for Cristy on this date of service. This time includes time spent by me in the following activities:preparing for the visit and reviewing tests

## 2022-11-17 DIAGNOSIS — E11.9 CONTROLLED TYPE 2 DIABETES MELLITUS WITHOUT COMPLICATION, WITHOUT LONG-TERM CURRENT USE OF INSULIN: ICD-10-CM

## 2022-11-18 RX ORDER — BLOOD SUGAR DIAGNOSTIC
STRIP MISCELLANEOUS
Qty: 100 EACH | Refills: 3 | Status: SHIPPED | OUTPATIENT
Start: 2022-11-18

## 2022-11-28 ENCOUNTER — CLINICAL SUPPORT (OUTPATIENT)
Dept: INTERNAL MEDICINE | Facility: CLINIC | Age: 87
End: 2022-11-28

## 2022-11-28 DIAGNOSIS — Z23 NEED FOR VACCINATION: Primary | ICD-10-CM

## 2022-11-28 PROCEDURE — 0124A PR ADM SARSCOV2 30MCG/0.3ML BST: CPT | Performed by: INTERNAL MEDICINE

## 2022-11-28 PROCEDURE — 91312: CPT | Performed by: INTERNAL MEDICINE

## 2022-12-06 ENCOUNTER — LAB (OUTPATIENT)
Dept: OTHER | Facility: HOSPITAL | Age: 87
End: 2022-12-06

## 2022-12-06 ENCOUNTER — INFUSION (OUTPATIENT)
Dept: ONCOLOGY | Facility: HOSPITAL | Age: 87
End: 2022-12-06

## 2022-12-06 VITALS — RESPIRATION RATE: 18 BRPM | HEIGHT: 64 IN | TEMPERATURE: 98.7 F | BODY MASS INDEX: 19.5 KG/M2

## 2022-12-06 DIAGNOSIS — M80.00XA AGE-RELATED OSTEOPOROSIS WITH CURRENT PATHOLOGICAL FRACTURE, INITIAL ENCOUNTER: Primary | ICD-10-CM

## 2022-12-06 LAB
ALBUMIN SERPL-MCNC: 3.7 G/DL (ref 3.5–5.2)
ALBUMIN/GLOB SERPL: 1.4 G/DL
ALP SERPL-CCNC: 75 U/L (ref 39–117)
ALT SERPL W P-5'-P-CCNC: 16 U/L (ref 1–33)
ANION GAP SERPL CALCULATED.3IONS-SCNC: 6.5 MMOL/L (ref 5–15)
AST SERPL-CCNC: 19 U/L (ref 1–32)
BILIRUB SERPL-MCNC: 0.4 MG/DL (ref 0–1.2)
BUN SERPL-MCNC: 26 MG/DL (ref 8–23)
BUN/CREAT SERPL: 33.8 (ref 7–25)
CALCIUM SPEC-SCNC: 9.9 MG/DL (ref 8.6–10.5)
CHLORIDE SERPL-SCNC: 103 MMOL/L (ref 98–107)
CO2 SERPL-SCNC: 30.5 MMOL/L (ref 22–29)
CREAT SERPL-MCNC: 0.77 MG/DL (ref 0.57–1)
EGFRCR SERPLBLD CKD-EPI 2021: 74.3 ML/MIN/1.73
GLOBULIN UR ELPH-MCNC: 2.7 GM/DL
GLUCOSE SERPL-MCNC: 156 MG/DL (ref 65–99)
MAGNESIUM SERPL-MCNC: 2 MG/DL (ref 1.6–2.4)
PHOSPHATE SERPL-MCNC: 3.1 MG/DL (ref 2.5–4.5)
POTASSIUM SERPL-SCNC: 4.6 MMOL/L (ref 3.5–5.2)
PROT SERPL-MCNC: 6.4 G/DL (ref 6–8.5)
SODIUM SERPL-SCNC: 140 MMOL/L (ref 136–145)

## 2022-12-06 PROCEDURE — 83735 ASSAY OF MAGNESIUM: CPT | Performed by: INTERNAL MEDICINE

## 2022-12-06 PROCEDURE — 80053 COMPREHEN METABOLIC PANEL: CPT | Performed by: INTERNAL MEDICINE

## 2022-12-06 PROCEDURE — 96372 THER/PROPH/DIAG INJ SC/IM: CPT

## 2022-12-06 PROCEDURE — 84100 ASSAY OF PHOSPHORUS: CPT | Performed by: INTERNAL MEDICINE

## 2022-12-06 PROCEDURE — 25010000002 DENOSUMAB 60 MG/ML SOLUTION PREFILLED SYRINGE: Performed by: INTERNAL MEDICINE

## 2022-12-06 RX ADMIN — DENOSUMAB 60 MG: 60 INJECTION SUBCUTANEOUS at 13:52

## 2022-12-06 NOTE — NURSING NOTE
Arrived for prolia injection. Indication and side effects reviewed. Denies recent dental work. Labs and medications verified. Prolia administered in right arm without incidence. Instructed to call prescribing MD for any concerns or questions and instructed on how to schedule future appts.  Pt vu and discharged in stable condition.

## 2023-01-05 ENCOUNTER — OFFICE VISIT (OUTPATIENT)
Dept: INTERNAL MEDICINE | Facility: CLINIC | Age: 88
End: 2023-01-05
Payer: MEDICARE

## 2023-01-05 VITALS
HEIGHT: 64 IN | WEIGHT: 112 LBS | DIASTOLIC BLOOD PRESSURE: 80 MMHG | HEART RATE: 66 BPM | SYSTOLIC BLOOD PRESSURE: 118 MMHG | TEMPERATURE: 97.6 F | OXYGEN SATURATION: 95 % | RESPIRATION RATE: 16 BRPM | BODY MASS INDEX: 19.12 KG/M2

## 2023-01-05 DIAGNOSIS — F43.23 ADJUSTMENT DISORDER WITH MIXED ANXIETY AND DEPRESSED MOOD: ICD-10-CM

## 2023-01-05 DIAGNOSIS — Z79.899 MEDICATION MANAGEMENT: ICD-10-CM

## 2023-01-05 DIAGNOSIS — E11.9 CONTROLLED TYPE 2 DIABETES MELLITUS WITHOUT COMPLICATION, WITHOUT LONG-TERM CURRENT USE OF INSULIN: Primary | Chronic | ICD-10-CM

## 2023-01-05 DIAGNOSIS — Z86.73 HISTORY OF STROKE: ICD-10-CM

## 2023-01-05 PROCEDURE — 99214 OFFICE O/P EST MOD 30 MIN: CPT | Performed by: INTERNAL MEDICINE

## 2023-01-05 NOTE — PROGRESS NOTES
Subjective   Cristy Wilde is a 88 y.o. female.     Chief Complaint   Patient presents with   • Hyperlipidemia   • Diabetes   • Rt. Eye Weak         Hyperlipidemia  This is a chronic problem. The current episode started more than 1 year ago. The problem is controlled. Pertinent negatives include no chest pain or shortness of breath.   Diabetes  She presents for her follow-up diabetic visit. She has type 2 diabetes mellitus. Her disease course has been stable. Pertinent negatives for diabetes include no chest pain. Diabetic complications include a CVA.   Osteoporosis  This is a chronic problem. The current episode started more than 1 year ago. The problem occurs constantly. Pertinent negatives include no chest pain.   Stroke  This is a chronic problem. The current episode started more than 1 year ago. The problem occurs constantly. The problem has been unchanged. Pertinent negatives include no chest pain.   Osteoarthritis  Presents for follow-up visit. Pertinent negatives include no diarrhea. Her past medical history is significant for osteoarthritis.   Dementia  This is a chronic problem. The current episode started more than 1 year ago. The problem occurs constantly. Pertinent negatives include no chest pain.        The following portions of the patient's history were reviewed and updated as appropriate: allergies, current medications, past social history and problem list.    Outpatient Medications Marked as Taking for the 1/5/23 encounter (Office Visit) with Arben Wright MD   Medication Sig Dispense Refill   • aspirin 81 MG EC tablet Take 1 tablet by mouth Daily. 30 tablet 0   • atorvastatin (LIPITOR) 20 MG tablet Take 1 tablet by mouth Daily. 90 tablet 0   • Cholecalciferol (VITAMIN D3) 75 MCG (3000 UT) tablet Take 3,000 Units by mouth Daily.     • cycloSPORINE (RESTASIS) 0.05 % ophthalmic emulsion Administer 1 drop to both eyes 2 (Two) Times a Day.     • denosumab (PROLIA) 60 MG/ML solution syringe  Inject 60 mg under the skin into the appropriate area as directed Every 6 (Six) Months.     • docusate sodium (COLACE) 100 MG capsule Take 200 mg by mouth Every Night. Taking 2 capsules at night     • escitalopram (LEXAPRO) 10 MG tablet TAKE ONE TABLET BY MOUTH DAILY 90 tablet 1   • Lanc[employer] (ONE TOUCH DELICA LANCING DEV) misc 1 each Daily. DX: E11.9 TYPE 2 DM 1 each 1   • Lanc[employer] Devices (OneTouch Delica Plus Lancing) misc 1 each Daily. USE ONETOUCH DELICA PLUS LANCING DEVICE ONCE DAILY FOR GLUCOSE MONITOR  DX: E11.9 1 each 11   • Magnesium 250 MG tablet Take 1 tablet by mouth Every Night.     • metFORMIN ER (GLUCOPHAGE-XR) 500 MG 24 hr tablet TAKE ONE TABLET BY MOUTH DAILY WITH BREAKFAST 90 tablet 1   • OneTouch Ultra test strip USE ONE STRIP TO TEST DAILY 100 each 3   • vitamin B-12 (CYANOCOBALAMIN) 1000 MCG tablet Take 1 tablet by mouth Daily. 30 tablet 5       Review of Systems   Respiratory: Negative for shortness of breath and wheezing.    Cardiovascular: Negative for chest pain, palpitations and leg swelling.   Gastrointestinal: Negative for constipation ( controlled with stool softener) and diarrhea.       Objective   Vitals:    01/05/23 1014   BP: 118/80   Pulse: 66   Resp: 16   Temp: 97.6 °F (36.4 °C)   SpO2: 95%      Wt Readings from Last 3 Encounters:   01/05/23 50.8 kg (112 lb)   10/26/22 51.5 kg (113 lb 9.6 oz)   10/19/22 50.4 kg (111 lb 3.2 oz)    Body mass index is 19.22 kg/m².      Physical Exam   Constitutional: She appears well-developed.   Cardiovascular: Normal rate, regular rhythm and normal heart sounds. Exam reveals no gallop.   No murmur heard.  Pulmonary/Chest: Effort normal and breath sounds normal. No respiratory distress. She has no wheezes. She has no rales.   Abdominal: Soft. Normal appearance and bowel sounds are normal. She exhibits no mass. There is no abdominal tenderness. There is no guarding.   Neurological: She is alert.         Problems Addressed this Visit         Endocrine and Metabolic    Controlled type 2 diabetes mellitus without complication, without long-term current use of insulin (HCC) - Primary (Chronic)       Mental Health    Adjustment disorder with mixed anxiety and depressed mood       Neuro    History of stroke   Diagnoses       Codes Comments    Controlled type 2 diabetes mellitus without complication, without long-term current use of insulin (HCC)    -  Primary ICD-10-CM: E11.9  ICD-9-CM: 250.00     Adjustment disorder with mixed anxiety and depressed mood     ICD-10-CM: F43.23  ICD-9-CM: 309.28     History of stroke     ICD-10-CM: Z86.73  ICD-9-CM: V12.54         Assessment & Plan   In for follow-up of diabetes mellitus, hyperlipidemia, stroke and osteoporosis today January 2023.  She has trouble with balance.  Tends to fall backwards.  Uses a cane occasionally but not using it religiously.  Advised her to take it more regularly.  Also she needs to think about using a walker at this point.  She is certainly at risk with her underlying osteoporosis a fracture with a fall.  She had a stroke in May 2019 and developed diabetes mellitus shortly after that time.  She is having some troubles with memory.  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  Irregardless her medications look good.  She is on a Lipitor 20 mg daily for lipids and Metformin  mg daily for diabetes and those are reviewed today.  Also on aspirin.  Annual lab work due today January 2023 including CBC, CMP, lipids, A1c.  She will need  lab work including glucose, A1c, and lipids today and every 3 months.  Recheck vitamin D level October 2022.  She is currently on 2520 mg of calcium daily along with 3,000 units of vitamin D daily.  Gets Prolia shots every 6 months for osteoporosis.  Finances are tight so it may be difficult to get neuropsychometric evaluation.  I suspect developing dementia.  We  checked an MMSE at 24 down from 29 down from 30 unchanged from 30 up from 25 down from 28 down from 29 up from 25 down from 29 and [place] at 19 down from 22 up from 21 down from 26 up from 25 down from 26 up from 23 up from 20 down from 22 today. Annual wellness October 2022.  Due for Shingrix.  Recent eye checkup was done by Dr. Grey Childs.  She is fasting today.      The above information was reviewed again today 01/05/23.  It continues to be accurate as reflected above and is unchanged.  History, physical and review of systems all reviewed and are unchanged.  Medications were reviewed today and continue the current dosing.    PPE today includes face mask and eye shield.           Dragon disclaimer:   Much of this encounter note is an electronic transcription/translation of spoken language to printed text. The electronic translation of spoken language may permit erroneous, or at times, nonsensical words or phrases to be inadvertently transcribed; Although I have reviewed the note for such errors, some may still exist.

## 2023-01-06 LAB
ALBUMIN SERPL-MCNC: 4.1 G/DL (ref 3.5–5.2)
ALBUMIN/GLOB SERPL: 1.9 G/DL
ALP SERPL-CCNC: 73 U/L (ref 39–117)
ALT SERPL-CCNC: 17 U/L (ref 1–33)
AST SERPL-CCNC: 21 U/L (ref 1–32)
BASOPHILS # BLD AUTO: 0.05 10*3/MM3 (ref 0–0.2)
BASOPHILS NFR BLD AUTO: 1 % (ref 0–1.5)
BILIRUB SERPL-MCNC: 0.5 MG/DL (ref 0–1.2)
BUN SERPL-MCNC: 23 MG/DL (ref 8–23)
BUN/CREAT SERPL: 31.5 (ref 7–25)
CALCIUM SERPL-MCNC: 9.4 MG/DL (ref 8.6–10.5)
CHLORIDE SERPL-SCNC: 104 MMOL/L (ref 98–107)
CHOLEST SERPL-MCNC: 147 MG/DL (ref 0–200)
CHOLEST/HDLC SERPL: 2.33 {RATIO}
CO2 SERPL-SCNC: 30.3 MMOL/L (ref 22–29)
CREAT SERPL-MCNC: 0.73 MG/DL (ref 0.57–1)
EGFRCR SERPLBLD CKD-EPI 2021: 79.2 ML/MIN/1.73
EOSINOPHIL # BLD AUTO: 0.27 10*3/MM3 (ref 0–0.4)
EOSINOPHIL NFR BLD AUTO: 5.3 % (ref 0.3–6.2)
ERYTHROCYTE [DISTWIDTH] IN BLOOD BY AUTOMATED COUNT: 12.6 % (ref 12.3–15.4)
GLOBULIN SER CALC-MCNC: 2.2 GM/DL
GLUCOSE SERPL-MCNC: 126 MG/DL (ref 65–99)
HBA1C MFR BLD: 6.6 % (ref 4.8–5.6)
HCT VFR BLD AUTO: 38.9 % (ref 34–46.6)
HDLC SERPL-MCNC: 63 MG/DL (ref 40–60)
HGB BLD-MCNC: 13.2 G/DL (ref 12–15.9)
IMM GRANULOCYTES # BLD AUTO: 0.01 10*3/MM3 (ref 0–0.05)
IMM GRANULOCYTES NFR BLD AUTO: 0.2 % (ref 0–0.5)
LDLC SERPL CALC-MCNC: 73 MG/DL (ref 0–100)
LYMPHOCYTES # BLD AUTO: 0.88 10*3/MM3 (ref 0.7–3.1)
LYMPHOCYTES NFR BLD AUTO: 17.3 % (ref 19.6–45.3)
MCH RBC QN AUTO: 29.7 PG (ref 26.6–33)
MCHC RBC AUTO-ENTMCNC: 33.9 G/DL (ref 31.5–35.7)
MCV RBC AUTO: 87.4 FL (ref 79–97)
MONOCYTES # BLD AUTO: 0.53 10*3/MM3 (ref 0.1–0.9)
MONOCYTES NFR BLD AUTO: 10.4 % (ref 5–12)
NEUTROPHILS # BLD AUTO: 3.35 10*3/MM3 (ref 1.7–7)
NEUTROPHILS NFR BLD AUTO: 65.8 % (ref 42.7–76)
NRBC BLD AUTO-RTO: 0 /100 WBC (ref 0–0.2)
PLATELET # BLD AUTO: 185 10*3/MM3 (ref 140–450)
POTASSIUM SERPL-SCNC: 4 MMOL/L (ref 3.5–5.2)
PROT SERPL-MCNC: 6.3 G/DL (ref 6–8.5)
RBC # BLD AUTO: 4.45 10*6/MM3 (ref 3.77–5.28)
SODIUM SERPL-SCNC: 141 MMOL/L (ref 136–145)
TRIGL SERPL-MCNC: 49 MG/DL (ref 0–150)
VLDLC SERPL CALC-MCNC: 11 MG/DL (ref 5–40)
WBC # BLD AUTO: 5.09 10*3/MM3 (ref 3.4–10.8)

## 2023-01-16 ENCOUNTER — TELEPHONE (OUTPATIENT)
Dept: INTERNAL MEDICINE | Facility: CLINIC | Age: 88
End: 2023-01-16
Payer: MEDICARE

## 2023-01-16 RX ORDER — ESCITALOPRAM OXALATE 10 MG/1
TABLET ORAL
Qty: 90 TABLET | Refills: 1 | Status: SHIPPED | OUTPATIENT
Start: 2023-01-16

## 2023-01-16 NOTE — TELEPHONE ENCOUNTER
Caller: Cristy Wilde     Relationship: SELF     Best call back number: [phone]    What is your medical concern? BURNING, DARK URINE, NEVER SEEN IT SO DARK.     How long has this issue been going on? A FEW DAYS, SEEMED TO GO AWAY AND THEN CAME BACK    Is your provider already aware of this issue? NO     Have you been treated for this issue? NO

## 2023-01-17 ENCOUNTER — OFFICE VISIT (OUTPATIENT)
Dept: INTERNAL MEDICINE | Facility: CLINIC | Age: 88
End: 2023-01-17
Payer: MEDICARE

## 2023-01-17 VITALS
HEART RATE: 66 BPM | TEMPERATURE: 97.8 F | SYSTOLIC BLOOD PRESSURE: 110 MMHG | HEIGHT: 64 IN | RESPIRATION RATE: 16 BRPM | DIASTOLIC BLOOD PRESSURE: 60 MMHG | OXYGEN SATURATION: 98 % | WEIGHT: 114 LBS | BODY MASS INDEX: 19.46 KG/M2

## 2023-01-17 DIAGNOSIS — R30.0 DYSURIA: ICD-10-CM

## 2023-01-17 DIAGNOSIS — R35.0 FREQUENT URINATION: Primary | ICD-10-CM

## 2023-01-17 LAB
BILIRUB BLD-MCNC: NEGATIVE MG/DL
EXPIRATION DATE: ABNORMAL
GLUCOSE UR STRIP-MCNC: ABNORMAL MG/DL
KETONES UR QL: NEGATIVE
LEUKOCYTE EST, POC: ABNORMAL
Lab: ABNORMAL
NITRITE UR-MCNC: NEGATIVE MG/ML
PH UR: 7.5 [PH] (ref 5–8)
PROT UR STRIP-MCNC: NEGATIVE MG/DL
RBC # UR STRIP: ABNORMAL /UL
SP GR UR: 1.01 (ref 1–1.03)
UROBILINOGEN UR QL: ABNORMAL

## 2023-01-17 PROCEDURE — 81003 URINALYSIS AUTO W/O SCOPE: CPT | Performed by: INTERNAL MEDICINE

## 2023-01-17 PROCEDURE — 99213 OFFICE O/P EST LOW 20 MIN: CPT | Performed by: INTERNAL MEDICINE

## 2023-01-17 NOTE — PROGRESS NOTES
Subjective   Cristy Wilde is a 88 y.o. female.     Chief Complaint   Patient presents with   • Urinary Frequency         History of Present Illness     The following portions of the patient's history were reviewed and updated as appropriate: allergies, current medications, past social history and problem list.    Outpatient Medications Marked as Taking for the 1/17/23 encounter (Office Visit) with Arben Wright MD   Medication Sig Dispense Refill   • aspirin 81 MG EC tablet Take 1 tablet by mouth Daily. 30 tablet 0   • atorvastatin (LIPITOR) 20 MG tablet Take 1 tablet by mouth Daily. 90 tablet 0   • Cholecalciferol (VITAMIN D3) 75 MCG (3000 UT) tablet Take 3,000 Units by mouth Daily.     • cycloSPORINE (RESTASIS) 0.05 % ophthalmic emulsion Administer 1 drop to both eyes 2 (Two) Times a Day.     • denosumab (PROLIA) 60 MG/ML solution syringe Inject 60 mg under the skin into the appropriate area as directed Every 6 (Six) Months.     • docusate sodium (COLACE) 100 MG capsule Take 200 mg by mouth Every Night. Taking 2 capsules at night     • escitalopram (LEXAPRO) 10 MG tablet TAKE ONE TABLET BY MOUTH DAILY 90 tablet 1   • Lancet Devices (ONE TOUCH DELICA LANCING DEV) misc 1 each Daily. DX: E11.9 TYPE 2 DM 1 each 1   • Lancet Devices (OneTouch Delica Plus Lancing) misc 1 each Daily. USE ONETOUCH DELICA PLUS LANCING DEVICE ONCE DAILY FOR GLUCOSE MONITOR  DX: E11.9 1 each 11   • Magnesium 250 MG tablet Take 1 tablet by mouth Every Night.     • metFORMIN ER (GLUCOPHAGE-XR) 500 MG 24 hr tablet TAKE ONE TABLET BY MOUTH DAILY WITH BREAKFAST 90 tablet 1   • OneTouch Ultra test strip USE ONE STRIP TO TEST DAILY 100 each 3   • vitamin B-12 (CYANOCOBALAMIN) 1000 MCG tablet Take 1 tablet by mouth Daily. 30 tablet 5       Review of Systems   Constitutional: Negative for chills and fever.   Genitourinary: Positive for difficulty urinating and dysuria. Negative for frequency and vaginal bleeding.       Objective    Vitals:    01/17/23 1406   BP: 110/60   Pulse: 66   Resp: 16   Temp: 97.8 °F (36.6 °C)   SpO2: 98%      Wt Readings from Last 3 Encounters:   01/17/23 51.7 kg (114 lb)   01/05/23 50.8 kg (112 lb)   10/26/22 51.5 kg (113 lb 9.6 oz)    Body mass index is 19.57 kg/m².      Physical Exam  Constitutional:       Appearance: Normal appearance.   Abdominal:      General: There is no distension.      Palpations: Abdomen is soft.      Tenderness: There is no abdominal tenderness. There is no right CVA tenderness, left CVA tenderness or guarding.   Neurological:      Mental Status: She is alert.           Problems Addressed this Visit    None  Visit Diagnoses     Frequent urination    -  Primary    Relevant Orders    POC Urinalysis Dipstick, Automated (Completed)    Dysuria          Diagnoses       Codes Comments    Frequent urination    -  Primary ICD-10-CM: R35.0  ICD-9-CM: 788.41     Dysuria     ICD-10-CM: R30.0  ICD-9-CM: 788.1         Assessment & Plan   2 days ago she had some dysuria.  2 separate spells.  Fairly brief.  Urine was brown.  She thought it might be bloody since it was so dark.  Symptoms have now cleared up.  No systemic symptoms.  No prior history of symptoms like this.  Most likely diagnosis here would be atrophic vaginitis.  Urine is borderline but probably okay with 1+ blood and 2+ leukocytes.  Negative nitrates.  We will go ahead and send a culture today.  We might consider some topical estrogen cream but for now will just use lubricants such as vaginal lubricant.    The above information was reviewed again today 01/17/23.  It continues to be accurate as reflected above and is unchanged.  History, physical and review of systems all reviewed and are unchanged.  Medications were reviewed today and continue the current dosing.    PPE today includes face mask and eye shield.               Dragon disclaimer:   Part of this note may be an electronic transcription/translation of spoken language to printed  text using the Dragon Dictation System.

## 2023-01-18 ENCOUNTER — TELEPHONE (OUTPATIENT)
Dept: INTERNAL MEDICINE | Facility: CLINIC | Age: 88
End: 2023-01-18
Payer: MEDICARE

## 2023-01-18 NOTE — TELEPHONE ENCOUNTER
Submitted PA for one touch strips once daily via CoverMyMeds. If denied their preferred alternatives are:  AccuChek Saranya Plus  AccuChek Guide  AccuChek Guide Me  True Metrix  True Track    Will await decision to sent alternatives.

## 2023-02-01 DIAGNOSIS — I63.9 CEREBROVASCULAR ACCIDENT (CVA), UNSPECIFIED MECHANISM: ICD-10-CM

## 2023-02-01 DIAGNOSIS — Z79.899 ON STATIN THERAPY: ICD-10-CM

## 2023-02-02 RX ORDER — ATORVASTATIN CALCIUM 20 MG/1
TABLET, FILM COATED ORAL
Qty: 90 TABLET | Refills: 0 | Status: SHIPPED | OUTPATIENT
Start: 2023-02-02

## 2023-03-16 ENCOUNTER — TELEPHONE (OUTPATIENT)
Dept: INTERNAL MEDICINE | Facility: CLINIC | Age: 88
End: 2023-03-16
Payer: MEDICARE

## 2023-03-16 DIAGNOSIS — S03.00XA DISLOCATION OF TEMPOROMANDIBULAR JOINT, INITIAL ENCOUNTER: Primary | ICD-10-CM

## 2023-03-16 NOTE — TELEPHONE ENCOUNTER
Pt request referral for physical therapy for TMJ. [facility] with [facility]. Fax: [phone], phone [phone]. Please advise if ok to send.

## 2023-03-21 NOTE — TELEPHONE ENCOUNTER
Pt called today to request that we fax referral again, as office did not get this last week. Informed I will fax it again, just to check with them that they received it. We confirmed fax numbers & they were correct.

## 2023-04-13 ENCOUNTER — OFFICE VISIT (OUTPATIENT)
Dept: INTERNAL MEDICINE | Facility: CLINIC | Age: 88
End: 2023-04-13
Payer: MEDICARE

## 2023-04-13 VITALS
OXYGEN SATURATION: 97 % | HEIGHT: 64 IN | RESPIRATION RATE: 16 BRPM | SYSTOLIC BLOOD PRESSURE: 108 MMHG | TEMPERATURE: 98 F | BODY MASS INDEX: 19.81 KG/M2 | WEIGHT: 116 LBS | DIASTOLIC BLOOD PRESSURE: 60 MMHG | HEART RATE: 60 BPM

## 2023-04-13 DIAGNOSIS — E11.9 CONTROLLED TYPE 2 DIABETES MELLITUS WITHOUT COMPLICATION, WITHOUT LONG-TERM CURRENT USE OF INSULIN: Primary | ICD-10-CM

## 2023-04-13 DIAGNOSIS — F09 COGNITIVE DYSFUNCTION: Chronic | ICD-10-CM

## 2023-04-13 LAB
A/C: 30
POC CREATININE URINE: 100
POC MICROALBUMIN URINE: 30

## 2023-04-13 PROCEDURE — 1159F MED LIST DOCD IN RCRD: CPT | Performed by: INTERNAL MEDICINE

## 2023-04-13 PROCEDURE — 99214 OFFICE O/P EST MOD 30 MIN: CPT | Performed by: INTERNAL MEDICINE

## 2023-04-13 PROCEDURE — 82044 UR ALBUMIN SEMIQUANTITATIVE: CPT | Performed by: INTERNAL MEDICINE

## 2023-04-13 PROCEDURE — 1160F RVW MEDS BY RX/DR IN RCRD: CPT | Performed by: INTERNAL MEDICINE

## 2023-04-13 NOTE — PROGRESS NOTES
Subjective   Cristy Wilde is a 88 y.o. female.     Chief Complaint   Patient presents with   • Diabetes         Diabetes  She presents for her follow-up diabetic visit. She has type 2 diabetes mellitus. Her disease course has been stable. Hypoglycemia symptoms include confusion. Pertinent negatives for diabetes include no chest pain. Diabetic complications include a CVA.   Hyperlipidemia  This is a chronic problem. The current episode started more than 1 year ago. The problem is controlled. Pertinent negatives include no chest pain or shortness of breath.   Osteoporosis  This is a chronic problem. The current episode started more than 1 year ago. The problem occurs constantly. Pertinent negatives include no chest pain.   Stroke  This is a chronic problem. The current episode started more than 1 year ago. The problem occurs constantly. The problem has been unchanged. Pertinent negatives include no chest pain.   Osteoarthritis  Presents for follow-up visit. Pertinent negatives include no diarrhea. Her past medical history is significant for osteoarthritis.   Dementia  This is a chronic problem. The current episode started more than 1 year ago. The problem occurs constantly. Pertinent negatives include no chest pain.        The following portions of the patient's history were reviewed and updated as appropriate: allergies, current medications, past social history and problem list.    Outpatient Medications Marked as Taking for the 4/13/23 encounter (Office Visit) with Arben Wright MD   Medication Sig Dispense Refill   • aspirin 81 MG EC tablet Take 1 tablet by mouth Daily. 30 tablet 0   • atorvastatin (LIPITOR) 20 MG tablet TAKE ONE TABLET BY MOUTH DAILY 90 tablet 0   • Cholecalciferol (VITAMIN D3) 75 MCG (3000 UT) tablet Take 3,000 Units by mouth Daily.     • cycloSPORINE (RESTASIS) 0.05 % ophthalmic emulsion Administer 1 drop to both eyes 2 (Two) Times a Day.     • denosumab (PROLIA) 60 MG/ML solution  syringe Inject 1 mL under the skin into the appropriate area as directed Every 6 (Six) Months.     • docusate sodium (COLACE) 100 MG capsule Take 2 capsules by mouth Every Night. Taking 2 capsules at night     • escitalopram (LEXAPRO) 10 MG tablet TAKE ONE TABLET BY MOUTH DAILY 90 tablet 1   • Lancet [employer] (ONE TOUCH DELICA LANCING DEV) misc 1 each Daily. DX: E11.9 TYPE 2 DM 1 each 1   • Lancet Devices (OneTouch Delica Plus Lancing) misc 1 each Daily. USE ONETOUCH DELICA PLUS LANCING DEVICE ONCE DAILY FOR GLUCOSE MONITOR  DX: E11.9 1 each 11   • Magnesium 250 MG tablet Take 1 tablet by mouth Every Night.     • metFORMIN ER (GLUCOPHAGE-XR) 500 MG 24 hr tablet TAKE ONE TABLET BY MOUTH DAILY WITH BREAKFAST 90 tablet 1   • OneTouch Ultra test strip USE ONE STRIP TO TEST DAILY 100 each 3   • vitamin B-12 (CYANOCOBALAMIN) 1000 MCG tablet Take 1 tablet by mouth Daily. 30 tablet 5       Review of Systems   Respiratory: Negative for shortness of breath and wheezing.    Cardiovascular: Negative for chest pain, palpitations and leg swelling.   Gastrointestinal: Negative for constipation ( controlled with stool softener) and diarrhea.   Psychiatric/Behavioral: Positive for confusion.       Objective   Vitals:    04/13/23 0921   BP: 108/60   Pulse: 60   Resp: 16   Temp: 98 °F (36.7 °C)   SpO2: 97%      Wt Readings from Last 3 Encounters:   04/13/23 52.6 kg (116 lb)   01/17/23 51.7 kg (114 lb)   01/05/23 50.8 kg (112 lb)    Body mass index is 19.91 kg/m².      Physical Exam   Constitutional: She appears well-developed.   Cardiovascular: Normal rate, regular rhythm and normal heart sounds. Exam reveals no gallop.   No murmur heard.  Pulmonary/Chest: Effort normal and breath sounds normal. No respiratory distress. She has no wheezes. She has no rales.   Abdominal: Soft. Normal appearance and bowel sounds are normal. She exhibits no mass. There is no abdominal tenderness. There is no guarding.   Neurological: She is alert.          Problems Addressed this Visit        Endocrine and Metabolic    Controlled type 2 diabetes mellitus without complication, without long-term current use of insulin - Primary (Chronic)    Relevant Orders    POC Microalbumin       Neuro    Cognitive dysfunction (Chronic)   Diagnoses       Codes Comments    Controlled type 2 diabetes mellitus without complication, without long-term current use of insulin    -  Primary ICD-10-CM: E11.9  ICD-9-CM: 250.00     Cognitive dysfunction     ICD-10-CM: F09  ICD-9-CM: 294.9         Assessment & Plan   In for follow-up of diabetes mellitus, hyperlipidemia, stroke and osteoporosis today April 2023.  She has trouble with balance.  Tends to fall backwards.  Uses a cane occasionally but not using it religiously.  Advised her to take it more regularly.  Also she needs to think about using a walker at this point.  She is certainly at risk with her underlying osteoporosis a fracture with a fall.  She had a stroke in May 2019 and developed diabetes mellitus shortly after that time.  She is having some troubles with memory.  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  Irregardless her medications look good.  She is on a Lipitor 20 mg daily for lipids and Metformin  mg daily for diabetes and those are reviewed today.  Also on aspirin.  Annual lab work due January 2023 including CBC, CMP, lipids, A1c.  She will need  lab work including glucose, A1c, and lipids today and every 3 months.  Recheck vitamin D level October 2022.  She is currently on 2520 mg of calcium daily along with 3,000 units of vitamin D daily.  Gets Prolia shots every 6 months for osteoporosis.  Finances are tight so it may be difficult to get neuropsychometric evaluation.  I suspect developing dementia.  We checked an MMSE at 29 up from 24 down from 29 down from 30 unchanged from 30 up from 25 down from 28 down  from 29 up from 25 down from 29 and [place] at 29 up from 19 down from 22 up from 21 down from 26 up from 25 down from 26 up from 23 up from 20 down from 22 today. Annual wellness October 2023.  Due for Shingrix.  Recent eye checkup was done by Dr. Grey Childs.  Urine microalbumin performed today.  She is fasting today.      The above information was reviewed again today 04/13/23.  It continues to be accurate as reflected above and is unchanged.  History, physical and review of systems all reviewed and are unchanged.  Medications were reviewed today and continue the current dosing.    PPE today includes face mask and eye shield.         Dragon disclaimer:   Much of this encounter note is an electronic transcription/translation of spoken language to printed text. The electronic translation of spoken language may permit erroneous, or at times, nonsensical words or phrases to be inadvertently transcribed; Although I have reviewed the note for such errors, some may still exist.

## 2023-05-05 DIAGNOSIS — Z79.899 ON STATIN THERAPY: ICD-10-CM

## 2023-05-05 DIAGNOSIS — I63.9 CEREBROVASCULAR ACCIDENT (CVA), UNSPECIFIED MECHANISM: ICD-10-CM

## 2023-05-08 RX ORDER — ATORVASTATIN CALCIUM 20 MG/1
TABLET, FILM COATED ORAL
Qty: 90 TABLET | Refills: 0 | Status: SHIPPED | OUTPATIENT
Start: 2023-05-08 | End: 2023-05-15

## 2023-05-13 DIAGNOSIS — Z79.899 ON STATIN THERAPY: ICD-10-CM

## 2023-05-13 DIAGNOSIS — I63.9 CEREBROVASCULAR ACCIDENT (CVA), UNSPECIFIED MECHANISM: ICD-10-CM

## 2023-05-15 RX ORDER — METFORMIN HYDROCHLORIDE 500 MG/1
TABLET, EXTENDED RELEASE ORAL
Qty: 90 TABLET | Refills: 1 | Status: SHIPPED | OUTPATIENT
Start: 2023-05-15

## 2023-05-15 RX ORDER — ATORVASTATIN CALCIUM 20 MG/1
TABLET, FILM COATED ORAL
Qty: 90 TABLET | Refills: 0 | Status: SHIPPED | OUTPATIENT
Start: 2023-05-15

## 2023-05-24 ENCOUNTER — EXTERNAL PBMM DATA (OUTPATIENT)
Dept: PHARMACY | Facility: OTHER | Age: 88
End: 2023-05-24
Payer: MEDICARE

## 2023-06-02 DIAGNOSIS — M80.00XA AGE-RELATED OSTEOPOROSIS WITH CURRENT PATHOLOGICAL FRACTURE, INITIAL ENCOUNTER: Primary | ICD-10-CM

## 2023-06-08 ENCOUNTER — INFUSION (OUTPATIENT)
Dept: ONCOLOGY | Facility: HOSPITAL | Age: 88
End: 2023-06-08
Payer: MEDICARE

## 2023-06-08 ENCOUNTER — LAB (OUTPATIENT)
Dept: OTHER | Facility: HOSPITAL | Age: 88
End: 2023-06-08
Payer: MEDICARE

## 2023-06-08 VITALS — BODY MASS INDEX: 21.22 KG/M2 | TEMPERATURE: 97.6 F | HEIGHT: 62 IN | RESPIRATION RATE: 15 BRPM

## 2023-06-08 DIAGNOSIS — M80.00XA AGE-RELATED OSTEOPOROSIS WITH CURRENT PATHOLOGICAL FRACTURE, INITIAL ENCOUNTER: ICD-10-CM

## 2023-06-08 DIAGNOSIS — M80.00XA AGE-RELATED OSTEOPOROSIS WITH CURRENT PATHOLOGICAL FRACTURE, INITIAL ENCOUNTER: Primary | ICD-10-CM

## 2023-06-08 LAB
25(OH)D3 SERPL-MCNC: 44.1 NG/ML (ref 30–100)
ALBUMIN SERPL-MCNC: 3.6 G/DL (ref 3.5–5.2)
ALBUMIN/GLOB SERPL: 1.2 G/DL
ALP SERPL-CCNC: 81 U/L (ref 39–117)
ALT SERPL W P-5'-P-CCNC: 15 U/L (ref 1–33)
ANION GAP SERPL CALCULATED.3IONS-SCNC: 7.6 MMOL/L (ref 5–15)
AST SERPL-CCNC: 16 U/L (ref 1–32)
BILIRUB SERPL-MCNC: 0.4 MG/DL (ref 0–1.2)
BUN SERPL-MCNC: 26 MG/DL (ref 8–23)
BUN/CREAT SERPL: 34.7 (ref 7–25)
CALCIUM SPEC-SCNC: 10 MG/DL (ref 8.6–10.5)
CHLORIDE SERPL-SCNC: 102 MMOL/L (ref 98–107)
CO2 SERPL-SCNC: 28.4 MMOL/L (ref 22–29)
CREAT SERPL-MCNC: 0.75 MG/DL (ref 0.57–1)
EGFRCR SERPLBLD CKD-EPI 2021: 76.7 ML/MIN/1.73
GLOBULIN UR ELPH-MCNC: 3 GM/DL
GLUCOSE SERPL-MCNC: 169 MG/DL (ref 65–99)
MAGNESIUM SERPL-MCNC: 2.1 MG/DL (ref 1.6–2.4)
PHOSPHATE SERPL-MCNC: 3 MG/DL (ref 2.5–4.5)
POTASSIUM SERPL-SCNC: 4.6 MMOL/L (ref 3.5–5.2)
PROT SERPL-MCNC: 6.6 G/DL (ref 6–8.5)
SODIUM SERPL-SCNC: 138 MMOL/L (ref 136–145)

## 2023-06-08 PROCEDURE — 82306 VITAMIN D 25 HYDROXY: CPT | Performed by: INTERNAL MEDICINE

## 2023-06-08 PROCEDURE — 25010000002 DENOSUMAB 60 MG/ML SOLUTION PREFILLED SYRINGE: Performed by: INTERNAL MEDICINE

## 2023-06-08 PROCEDURE — 84100 ASSAY OF PHOSPHORUS: CPT | Performed by: INTERNAL MEDICINE

## 2023-06-08 PROCEDURE — 80053 COMPREHEN METABOLIC PANEL: CPT | Performed by: INTERNAL MEDICINE

## 2023-06-08 PROCEDURE — 96372 THER/PROPH/DIAG INJ SC/IM: CPT

## 2023-06-08 PROCEDURE — 83735 ASSAY OF MAGNESIUM: CPT | Performed by: INTERNAL MEDICINE

## 2023-06-08 RX ADMIN — DENOSUMAB 60 MG: 60 INJECTION SUBCUTANEOUS at 14:03

## 2023-06-08 NOTE — NURSING NOTE
Arrived  for prolia injection. Indication and side effects reviewed. Denies recent dental work. Labs and medications verified. Prolia administered in  R arm without incidence. Instructed to call prescribing MD for any concerns or questions and instructed on how to schedule future appts.  Pt vu and discharged in stable condition.

## 2023-06-12 ENCOUNTER — TELEPHONE (OUTPATIENT)
Dept: INTERNAL MEDICINE | Facility: CLINIC | Age: 88
End: 2023-06-12
Payer: MEDICARE

## 2023-06-12 NOTE — TELEPHONE ENCOUNTER
Spoke to patient today, reports having a fall on Thursday. Bruise on her eye & wanting to get a scan. Offered next available appt Tuesday 6/13, but declined as she wants this done today. Referred patient to ER for quicker evaluation & scan.

## 2023-06-13 ENCOUNTER — OFFICE VISIT (OUTPATIENT)
Dept: INTERNAL MEDICINE | Facility: CLINIC | Age: 88
End: 2023-06-13
Payer: MEDICARE

## 2023-06-13 VITALS
TEMPERATURE: 98.9 F | OXYGEN SATURATION: 95 % | HEART RATE: 83 BPM | DIASTOLIC BLOOD PRESSURE: 62 MMHG | RESPIRATION RATE: 16 BRPM | SYSTOLIC BLOOD PRESSURE: 108 MMHG | WEIGHT: 114.8 LBS | HEIGHT: 62 IN | BODY MASS INDEX: 21.12 KG/M2

## 2023-06-13 DIAGNOSIS — S05.11XA CONTUSION OF RIGHT ORBITAL TISSUES, INITIAL ENCOUNTER: Primary | ICD-10-CM

## 2023-06-13 NOTE — PROGRESS NOTES
Subjective   Cristy Wilde is a 88 y.o. female.     Chief Complaint   Patient presents with    Fall    Eye Trauma     Right eye bruise         Fall  The accident occurred 5 to 7 days ago. The fall occurred while walking. The volume of blood lost was minimal. The point of impact was the face. The pain is present in the face. The pain is mild. The symptoms are aggravated by pressure on injury. Pertinent negatives include no fever.      The following portions of the patient's history were reviewed and updated as appropriate: allergies, current medications, past social history and problem list.    Outpatient Medications Marked as Taking for the 6/13/23 encounter (Office Visit) with Arben Wright MD   Medication Sig Dispense Refill    aspirin 81 MG EC tablet Take 1 tablet by mouth Daily. 30 tablet 0    atorvastatin (LIPITOR) 20 MG tablet TAKE ONE TABLET BY MOUTH DAILY 90 tablet 0    Cholecalciferol (VITAMIN D3) 75 MCG (3000 UT) tablet Take 3,000 Units by mouth Daily.      cycloSPORINE (RESTASIS) 0.05 % ophthalmic emulsion Administer 1 drop to both eyes 2 (Two) Times a Day.      denosumab (PROLIA) 60 MG/ML solution syringe Inject 1 mL under the skin into the appropriate area as directed Every 6 (Six) Months.      docusate sodium (COLACE) 100 MG capsule Take 3 capsules by mouth Every Night.      escitalopram (LEXAPRO) 10 MG tablet TAKE ONE TABLET BY MOUTH DAILY 90 tablet 1    Lancet Devices (ONE TOUCH DELICA LANCING DEV) misc 1 each Daily. DX: E11.9 TYPE 2 DM 1 each 1    Lancet Devices (OneTouch Delica Plus Lancing) misc 1 each Daily. USE ONETOUCH DELICA PLUS LANCING DEVICE ONCE DAILY FOR GLUCOSE MONITOR  DX: E11.9 1 each 11    Magnesium 250 MG tablet Take 1 tablet by mouth Every Night.      metFORMIN ER (GLUCOPHAGE-XR) 500 MG 24 hr tablet TAKE ONE TABLET BY MOUTH DAILY WITH BREAKFAST 90 tablet 1    OneTouch Ultra test strip USE ONE STRIP TO TEST DAILY 100 each 3    vitamin B-12 (CYANOCOBALAMIN) 1000 MCG  tablet Take 1 tablet by mouth Daily. 30 tablet 5       Review of Systems   Constitutional:  Negative for chills and fever.   Skin:  Positive for color change and wound.     Objective   Vitals:    06/13/23 0953   BP: 108/62   Pulse: 83   Resp: 16   Temp: 98.9 °F (37.2 °C)   SpO2: 95%      Wt Readings from Last 3 Encounters:   06/13/23 52.1 kg (114 lb 12.8 oz)   04/13/23 52.6 kg (116 lb)   01/17/23 51.7 kg (114 lb)    Body mass index is 20.99 kg/m².      Physical Exam  Constitutional:       Appearance: Normal appearance.   Skin:     General: Skin is warm and dry.      Findings: Bruising present.   Neurological:      General: No focal deficit present.      Mental Status: She is alert.      Cranial Nerves: No cranial nerve deficit.      Coordination: Coordination normal.      Deep Tendon Reflexes: Reflexes normal.         Problems Addressed this Visit    None  Visit Diagnoses       Contusion of right orbital tissues, initial encounter    -  Primary          Diagnoses         Codes Comments    Contusion of right orbital tissues, initial encounter    -  Primary ICD-10-CM: S05.11XA  ICD-9-CM: 921.2           Assessment & Plan   In today with a fall.  This occurred 5 days ago.  She walked into the living room and tripped on the cedar chest or tripped towards a seizure chest.  Hit her right eyebrow area on the cedar chest or another object.  Lost a few drops of blood but that is all.  She now has some ecchymoses about the right orbit.  She has some minimal tenderness over the right supraorbital ridge on the lateral aspect.  Extraocular movements are intact.  Neurologically she is baseline.  Reassured that no x-rays are needed at this point.  The bruising will take several weeks to resolve itself.  Currently lives in her house independently.  Looking into moving into an independent living situation like the [facility].    The above information was reviewed again today 06/13/23.  It continues to be accurate as reflected  above and is unchanged.  History, physical and review of systems all reviewed and are unchanged.  Medications were reviewed today and continue the current dosing.    PPE today includes face mask and eye shield.             Dragon disclaimer:   Part of this note may be an electronic transcription/translation of spoken language to printed text using the Dragon Dictation System.

## 2023-06-20 ENCOUNTER — TELEPHONE (OUTPATIENT)
Dept: INTERNAL MEDICINE | Facility: CLINIC | Age: 88
End: 2023-06-20

## 2023-06-20 NOTE — TELEPHONE ENCOUNTER
Caller: Cristy Holguin    Relationship: Self    Best call back number:   L (Self) [phone]       What is the best time to reach you: ANY TIME    Who are you requesting to speak with (clinical staff, provider,  specific staff member): DR. MENA'S MA    Do you know the name of the person who called: CRISTY HOLGUIN    What was the call regarding: MED LIST    Is it okay if the provider responds through MyChart: NO

## 2023-07-12 LAB
CHOLEST SERPL-MCNC: 134 MG/DL (ref 100–199)
CHOLEST/HDLC SERPL: 2.1 RATIO (ref 0–4.4)
GLUCOSE SERPL-MCNC: 192 MG/DL (ref 70–99)
HBA1C MFR BLD: 6.4 % (ref 4.8–5.6)
HDLC SERPL-MCNC: 64 MG/DL
LDLC SERPL CALC-MCNC: 58 MG/DL (ref 0–99)
TRIGL SERPL-MCNC: 57 MG/DL (ref 0–149)
VLDLC SERPL CALC-MCNC: 12 MG/DL (ref 5–40)

## 2023-07-28 ENCOUNTER — TELEPHONE (OUTPATIENT)
Dept: INTERNAL MEDICINE | Facility: CLINIC | Age: 88
End: 2023-07-28
Payer: MEDICARE

## 2023-07-28 NOTE — TELEPHONE ENCOUNTER
HUB TO READ:    Patient already has labs done, no need for her to go to the lab.  I left a voice mail for patient to call back.

## 2023-07-28 NOTE — TELEPHONE ENCOUNTER
Spoke to patient, verbally understood. Patient requests copy of results, they have been mailed out today.

## 2023-08-14 RX ORDER — ESCITALOPRAM OXALATE 10 MG/1
TABLET ORAL
Qty: 90 TABLET | Refills: 1 | Status: SHIPPED | OUTPATIENT
Start: 2023-08-14

## 2023-08-29 ENCOUNTER — TELEPHONE (OUTPATIENT)
Dept: INTERNAL MEDICINE | Facility: CLINIC | Age: 88
End: 2023-08-29
Payer: MEDICARE

## 2023-08-29 NOTE — TELEPHONE ENCOUNTER
At 1 point her vitamin D level was too high.  That is likely when we stopped it.  It is okay to go back on 1 a day.  I believe they are 1000 unit capsules.

## 2023-08-29 NOTE — TELEPHONE ENCOUNTER
Caller: Cristy Wilde    Relationship: Self    Best call back number: [phone]     What is the best time to reach you: ANY    Who are you requesting to speak with (clinical staff, provider,  specific staff member): CLINICAL STAFF    What was the call regarding: PATIENT STATES VITAMIN D IS NO LONGER ON HER MED LIST & SHE WOULD LIKE TO KNOW WHY. PATIENT STATES SHE WAS SUPPOSED TO BE TAKEN DOWN FROM 2 TO 1 TABLET DAILY.     Is it okay if the provider responds through MyChart: NO

## 2023-10-05 ENCOUNTER — TELEPHONE (OUTPATIENT)
Dept: INTERNAL MEDICINE | Facility: CLINIC | Age: 88
End: 2023-10-05
Payer: MEDICARE

## 2023-10-05 NOTE — TELEPHONE ENCOUNTER
Caller: Cristy Wilde    Relationship: Self    Best call back number: [phone]    What was the call regarding: PATIENT WOULD LIKE TO KNOW IF SHE HAD HER FLU SHOT DONE LAST YEAR AT THE OFFICE. PLEASE ADVISE PATIENT WHEN SHE HAD THIS DONE AND WHERE.

## 2023-12-19 RX ORDER — METFORMIN HYDROCHLORIDE 500 MG/1
500 TABLET, EXTENDED RELEASE ORAL
Qty: 90 TABLET | Refills: 0 | Status: SHIPPED | OUTPATIENT
Start: 2023-12-19

## 2024-02-08 DIAGNOSIS — I63.9 CEREBROVASCULAR ACCIDENT (CVA), UNSPECIFIED MECHANISM: ICD-10-CM

## 2024-02-08 DIAGNOSIS — Z79.899 ON STATIN THERAPY: ICD-10-CM

## 2024-02-08 RX ORDER — ATORVASTATIN CALCIUM 20 MG/1
TABLET, FILM COATED ORAL
Qty: 90 TABLET | Refills: 0 | OUTPATIENT
Start: 2024-02-08

## 2024-02-16 DIAGNOSIS — I63.9 CEREBROVASCULAR ACCIDENT (CVA), UNSPECIFIED MECHANISM: ICD-10-CM

## 2024-02-16 DIAGNOSIS — Z79.899 ON STATIN THERAPY: ICD-10-CM

## 2024-02-16 DIAGNOSIS — E11.9 CONTROLLED TYPE 2 DIABETES MELLITUS WITHOUT COMPLICATION, WITHOUT LONG-TERM CURRENT USE OF INSULIN: ICD-10-CM

## 2024-02-16 RX ORDER — BLOOD SUGAR DIAGNOSTIC
STRIP MISCELLANEOUS
OUTPATIENT
Start: 2024-02-16

## 2024-02-16 RX ORDER — ATORVASTATIN CALCIUM 20 MG/1
TABLET, FILM COATED ORAL
Qty: 90 TABLET | Refills: 0 | OUTPATIENT
Start: 2024-02-16

## 2024-05-02 DIAGNOSIS — Z79.899 ON STATIN THERAPY: ICD-10-CM

## 2024-05-02 DIAGNOSIS — I63.9 CEREBROVASCULAR ACCIDENT (CVA), UNSPECIFIED MECHANISM: ICD-10-CM

## 2024-05-02 RX ORDER — METFORMIN HYDROCHLORIDE 500 MG/1
500 TABLET, EXTENDED RELEASE ORAL
Qty: 90 TABLET | Refills: 0 | OUTPATIENT
Start: 2024-05-02

## 2024-05-02 RX ORDER — ATORVASTATIN CALCIUM 20 MG/1
TABLET, FILM COATED ORAL
Qty: 90 TABLET | Refills: 0 | Status: SHIPPED | OUTPATIENT
Start: 2024-05-02

## 2024-05-02 RX ORDER — ESCITALOPRAM OXALATE 10 MG/1
10 TABLET ORAL DAILY
Qty: 90 TABLET | Refills: 1 | OUTPATIENT
Start: 2024-05-02

## 2024-05-02 NOTE — TELEPHONE ENCOUNTER
"Relay     \"You are due to schedule your next office visit. Once scheduled we should be able to send refills in.\"                  "

## 2024-05-24 RX ORDER — METFORMIN HYDROCHLORIDE 500 MG/1
500 TABLET, EXTENDED RELEASE ORAL
Qty: 90 TABLET | Refills: 0 | OUTPATIENT
Start: 2024-05-24

## 2024-05-24 RX ORDER — ESCITALOPRAM OXALATE 10 MG/1
10 TABLET ORAL DAILY
Qty: 90 TABLET | Refills: 1 | OUTPATIENT
Start: 2024-05-24

## 2024-05-31 RX ORDER — ESCITALOPRAM OXALATE 10 MG/1
10 TABLET ORAL DAILY
Qty: 90 TABLET | Refills: 1 | OUTPATIENT
Start: 2024-05-31

## 2024-05-31 RX ORDER — METFORMIN HYDROCHLORIDE 500 MG/1
500 TABLET, EXTENDED RELEASE ORAL
Qty: 90 TABLET | Refills: 0 | OUTPATIENT
Start: 2024-05-31

## 2024-06-07 RX ORDER — METFORMIN HYDROCHLORIDE 500 MG/1
500 TABLET, EXTENDED RELEASE ORAL
Qty: 90 TABLET | Refills: 0 | OUTPATIENT
Start: 2024-06-07

## 2024-06-07 NOTE — TELEPHONE ENCOUNTER
"Relay     \"You are overdue to see Dr Wright. Will need to be seen prior to getting refills.\"                  "

## 2025-04-22 ENCOUNTER — PATIENT ROUNDING (BHMG ONLY) (OUTPATIENT)
Dept: URGENT CARE | Facility: CLINIC | Age: OVER 89
End: 2025-04-22

## 2025-04-22 NOTE — ED NOTES
Thank you for letting us care for you during your recent visit at [facility]. We would love to hear about your experience with us.     We’re always looking for ways to make our patients’ experiences even better. Do you have any recommendations on ways we may improve?    I appreciate you taking the time to respond. Please be on the lookout for a survey about your recent visit from [facility] via text or email. We would greatly appreciate if you could fill that out and turn it back in. We want your voice to be heard and we value your feedback.     Thank you for choosing [facility] for your healthcare needs.

## 2025-07-01 ENCOUNTER — APPOINTMENT (OUTPATIENT)
Dept: GENERAL RADIOLOGY | Facility: HOSPITAL | Age: OVER 89
End: 2025-07-01
Payer: MEDICARE

## 2025-07-01 ENCOUNTER — APPOINTMENT (OUTPATIENT)
Dept: CT IMAGING | Facility: HOSPITAL | Age: OVER 89
End: 2025-07-01
Payer: MEDICARE

## 2025-07-01 ENCOUNTER — HOSPITAL ENCOUNTER (OUTPATIENT)
Facility: HOSPITAL | Age: OVER 89
Setting detail: OBSERVATION
Discharge: HOME | End: 2025-07-03
Attending: STUDENT IN AN ORGANIZED HEALTH CARE EDUCATION/TRAINING PROGRAM | Admitting: INTERNAL MEDICINE
Payer: MEDICARE

## 2025-07-01 DIAGNOSIS — R53.1 GENERALIZED WEAKNESS: ICD-10-CM

## 2025-07-01 DIAGNOSIS — R47.81 SLURRED SPEECH: Primary | ICD-10-CM

## 2025-07-01 DIAGNOSIS — I63.9 CEREBROVASCULAR ACCIDENT (CVA), UNSPECIFIED MECHANISM: ICD-10-CM

## 2025-07-01 DIAGNOSIS — R29.810 FACIAL DROOP: ICD-10-CM

## 2025-07-01 DIAGNOSIS — R20.0 LEFT FACIAL NUMBNESS: ICD-10-CM

## 2025-07-01 LAB
ALBUMIN SERPL-MCNC: 4.3 G/DL (ref 3.5–5.2)
ALBUMIN/GLOB SERPL: 1.7 G/DL
ALP SERPL-CCNC: 97 U/L (ref 39–117)
ALT SERPL W P-5'-P-CCNC: 55 U/L (ref 1–33)
ANION GAP SERPL CALCULATED.3IONS-SCNC: 12.3 MMOL/L (ref 5–15)
AST SERPL-CCNC: 42 U/L (ref 1–32)
BASOPHILS # BLD AUTO: 0.02 10*3/MM3 (ref 0–0.2)
BASOPHILS NFR BLD AUTO: 0.4 % (ref 0–1.5)
BILIRUB SERPL-MCNC: 0.3 MG/DL (ref 0–1.2)
BUN SERPL-MCNC: 30.2 MG/DL (ref 8–23)
BUN/CREAT SERPL: 37.8 (ref 7–25)
CALCIUM SPEC-SCNC: 9.8 MG/DL (ref 8.2–9.6)
CHLORIDE SERPL-SCNC: 102 MMOL/L (ref 98–107)
CO2 SERPL-SCNC: 24.7 MMOL/L (ref 22–29)
CREAT SERPL-MCNC: 0.8 MG/DL (ref 0.57–1)
DEPRECATED RDW RBC AUTO: 48.6 FL (ref 37–54)
EGFRCR SERPLBLD CKD-EPI 2021: 70.1 ML/MIN/1.73
EOSINOPHIL # BLD AUTO: 0.1 10*3/MM3 (ref 0–0.4)
EOSINOPHIL NFR BLD AUTO: 1.8 % (ref 0.3–6.2)
ERYTHROCYTE [DISTWIDTH] IN BLOOD BY AUTOMATED COUNT: 14.5 % (ref 12.3–15.4)
GLOBULIN UR ELPH-MCNC: 2.6 GM/DL
GLUCOSE BLDC GLUCOMTR-MCNC: 149 MG/DL (ref 70–130)
GLUCOSE SERPL-MCNC: 161 MG/DL (ref 65–99)
HCT VFR BLD AUTO: 43.1 % (ref 34–46.6)
HGB BLD-MCNC: 14.3 G/DL (ref 12–15.9)
HOLD SPECIMEN: NORMAL
HOLD SPECIMEN: NORMAL
IMM GRANULOCYTES # BLD AUTO: 0.01 10*3/MM3 (ref 0–0.05)
IMM GRANULOCYTES NFR BLD AUTO: 0.2 % (ref 0–0.5)
INR PPP: 1
LYMPHOCYTES # BLD AUTO: 1.22 10*3/MM3 (ref 0.7–3.1)
LYMPHOCYTES NFR BLD AUTO: 21.8 % (ref 19.6–45.3)
MCH RBC QN AUTO: 30.1 PG (ref 26.6–33)
MCHC RBC AUTO-ENTMCNC: 33.2 G/DL (ref 31.5–35.7)
MCV RBC AUTO: 90.7 FL (ref 79–97)
MONOCYTES # BLD AUTO: 0.63 10*3/MM3 (ref 0.1–0.9)
MONOCYTES NFR BLD AUTO: 11.3 % (ref 5–12)
NEUTROPHILS NFR BLD AUTO: 3.62 10*3/MM3 (ref 1.7–7)
NEUTROPHILS NFR BLD AUTO: 64.5 % (ref 42.7–76)
PLATELET # BLD AUTO: 206 10*3/MM3 (ref 140–450)
PMV BLD AUTO: 8.6 FL (ref 6–12)
POTASSIUM SERPL-SCNC: 4.5 MMOL/L (ref 3.5–5.2)
PROT SERPL-MCNC: 6.9 G/DL (ref 6–8.5)
PROTHROMBIN TIME: 12.1 SECONDS (ref 11–15)
RBC # BLD AUTO: 4.75 10*6/MM3 (ref 3.77–5.28)
SODIUM SERPL-SCNC: 139 MMOL/L (ref 136–145)
WBC NRBC COR # BLD AUTO: 5.6 10*3/MM3 (ref 3.4–10.8)
WHOLE BLOOD HOLD COAG: NORMAL
WHOLE BLOOD HOLD SPECIMEN: NORMAL

## 2025-07-01 PROCEDURE — 70498 CT ANGIOGRAPHY NECK: CPT

## 2025-07-01 PROCEDURE — 70496 CT ANGIOGRAPHY HEAD: CPT

## 2025-07-01 PROCEDURE — 71045 X-RAY EXAM CHEST 1 VIEW: CPT

## 2025-07-01 PROCEDURE — 80053 COMPREHEN METABOLIC PANEL: CPT | Performed by: STUDENT IN AN ORGANIZED HEALTH CARE EDUCATION/TRAINING PROGRAM

## 2025-07-01 PROCEDURE — 85025 COMPLETE CBC W/AUTO DIFF WBC: CPT | Performed by: STUDENT IN AN ORGANIZED HEALTH CARE EDUCATION/TRAINING PROGRAM

## 2025-07-01 PROCEDURE — 36415 COLL VENOUS BLD VENIPUNCTURE: CPT

## 2025-07-01 PROCEDURE — 85610 PROTHROMBIN TIME: CPT

## 2025-07-01 PROCEDURE — 99285 EMERGENCY DEPT VISIT HI MDM: CPT

## 2025-07-01 PROCEDURE — 99285 EMERGENCY DEPT VISIT HI MDM: CPT | Performed by: STUDENT IN AN ORGANIZED HEALTH CARE EDUCATION/TRAINING PROGRAM

## 2025-07-01 PROCEDURE — 0042T HC CT CEREBRAL PERFUSION W/WO CONTRAST: CPT

## 2025-07-01 PROCEDURE — 82948 REAGENT STRIP/BLOOD GLUCOSE: CPT

## 2025-07-01 PROCEDURE — 93005 ELECTROCARDIOGRAM TRACING: CPT | Performed by: STUDENT IN AN ORGANIZED HEALTH CARE EDUCATION/TRAINING PROGRAM

## 2025-07-01 PROCEDURE — 25510000001 IOPAMIDOL PER 1 ML: Performed by: STUDENT IN AN ORGANIZED HEALTH CARE EDUCATION/TRAINING PROGRAM

## 2025-07-01 RX ORDER — IOPAMIDOL 755 MG/ML
150 INJECTION, SOLUTION INTRAVASCULAR
Status: COMPLETED | OUTPATIENT
Start: 2025-07-01 | End: 2025-07-01

## 2025-07-01 RX ORDER — SODIUM CHLORIDE 0.9 % (FLUSH) 0.9 %
10 SYRINGE (ML) INJECTION AS NEEDED
Status: DISCONTINUED | OUTPATIENT
Start: 2025-07-01 | End: 2025-07-03 | Stop reason: HOSPADM

## 2025-07-01 RX ADMIN — IOPAMIDOL 150 ML: 755 INJECTION, SOLUTION INTRAVENOUS at 19:07

## 2025-07-01 NOTE — [facility] PROVIDER NOTE
Subjective     History provided by:  Patient (son)  History limited by:  Dementia      Chief Complaint   Patient presents with    Speech Problem         Review of Systems   Neurological:  Positive for facial asymmetry, speech difficulty and numbness.       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    Osteoarthritis, chronic     minimal sx controlled with exercise    Osteoporosis     Pancreatitis due to biliary obstruction 2013    relieved after lap lacho    Patient had no falls in past year     Postpartum depression     Prediabetes     Reaction, adjustment, with anxious, depressed mood     Stroke     Vertigo 9/11/2018    Recurrent, 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 Davis    TONSILLECTOMY  1957       Family History   Problem Relation Age of Onset    Diabetes Mother     Heart disease Mother     Depression Father     Parkinsonism Father     Diabetes Sister     Alzheimer's disease Brother     Diabetes Brother     Diabetes Maternal Grandmother     Diabetes Brother     Prostate cancer Brother     Diabetes Sister     No Known Problems Daughter      No Known Problems Son        Social History     Socioeconomic History    Marital status:     Number of children: 2   Tobacco Use    Smoking status: Never     Passive exposure: Never    Smokeless tobacco: Never   Substance and Sexual Activity    Alcohol use: Not Currently     Comment: quit in 2018    Drug use: No    Sexual activity: Never     Partners: Male     Comment:  only           Objective   Physical Exam  Vitals and nursing note reviewed.   Constitutional:       Appearance: Normal appearance.   HENT:      Head: Atraumatic.      Right Ear: External ear normal.      Left Ear: External ear normal.      Nose: Nose normal.      Mouth/Throat:      Pharynx: Oropharynx is clear.   Eyes:      Conjunctiva/sclera: Conjunctivae normal.   Cardiovascular:      Rate and Rhythm: Normal rate.   Pulmonary:      Effort: Pulmonary effort is normal. No respiratory distress.   Abdominal:      General: Abdomen is flat. There is no distension.   Musculoskeletal:         General: No swelling.      Cervical back: Neck supple.      Right lower leg: No edema.      Left lower leg: No edema.   Skin:     General: Skin is warm and dry.   Neurological:      Mental Status: She is alert and oriented to person, place, and time. Mental status is at baseline.   Psychiatric:         Behavior: Behavior normal.         Interval: baseline  1a. Level of Consciousness: 0-->Alert, keenly responsive  1b. LOC Questions: 1-->Answers one question correctly  1c. LOC Commands: 0-->Performs both tasks correctly  2. Best Gaze: 0-->Normal  3. Visual: 0-->No visual loss  4. Facial Palsy: 1-->Minor paralysis (flattened nasolabial fold, asymmetry on smiling)  5a. Motor Arm, Left: 0-->No drift, limb holds 90 (or 45) degrees for full 10 secs  5b. Motor Arm, Right: 0-->No drift, limb holds 90 (or 45) degrees for full 10 secs  6a. Motor Leg, Left: 0-->No drift, leg holds 30 degree position for full 5 secs  6b. Motor Leg, Right: 0-->No drift, leg holds  30 degree position for full 5 secs  7. Limb Ataxia: 0-->Absent  8. Sensory: 1-->Mild-to-moderate sensory loss, patient feels pinprick is less sharp or is dull on the affected side, or there is a loss of superficial pain with pinprick, but patient is aware of being touched  9. Best Language: 0-->No aphasia, normal  10. Dysarthria: 1-->Mild-to-moderate dysarthria, patient slurs at least some words and, at worst, can be understood with some difficulty  11. Extinction and Inattention (formerly Neglect): 0-->No abnormality    Total (NIH Stroke Scale): 4     Procedures           ED Course  ED Course as of 07/01/25 1944 Tue Jul 01, 2025 1852 Spoke with Dr. Cunningham, stroke neurologist, who agreed with stroke code and CT head, CTA head/neck.      [DL]   1912 Per Dr. Cunningham, CTA neg  Hence paged [facility] for admission for stroke work up.  [DL]   1916 Spoke with Yonatan Hernandez, who agreed to admit the patient for stroke work up.  [DL]   1944 ECG 12 Lead ED Triage Standing Order; Acute Stroke (Onset <24 hrs)  I reviewed the EKG myself and patient has sinus rhythm.  Heart rate 84.  IA interval 179.  QTc 428.  There is no abnormal ST elevation, depressions or T wave inversion. Similar to previous EKG in 2020.  [DL]      ED Course User Index  [DL] Dereck Miranda MD                          Total (NIH Stroke Scale): 4                Medical Decision Making  Problems Addressed:  Facial droop: complicated acute illness or injury  Left facial numbness: complicated acute illness or injury  Slurred speech: complicated acute illness or injury    Amount and/or Complexity of Data Reviewed  Labs: ordered.  Radiology: ordered.  ECG/medicine tests: ordered. Decision-making details documented in ED Course.    Risk  Prescription drug management.  Decision regarding hospitalization.    This is a 89-year-old female patient, history of diabetes type 2, previous cerebellar stroke, on aspirin, no blood thinner use.  She came to the  emergency department for concern for stroke.  At 12 PM, she was normal talking to her son.  However, at 6 PM, she talked to her son and she sounded very slurred to him.  He was concerned and asked her to raise her eyebrow and smile.  She unfortunately did have left facial droop as well as left facial numbness.  For that reason, he took her to the emergency room.  She denies any arm or leg numbness or weakness.    At baseline, the patient does have dementia and she does not know the time.  She is awake and alert x 2 at baseline.    Of note, the patient was recently diagnosed of right arm shingles on June 18.    On exam, the patient is well-appearing.  She does have some slurred speech.  She does have decree sensation to the left arm and mildly droopy to the left face although this appears to be improving per the son.  NIHSS for her is 4, including the orientation question.  She is able to ambulate although with unsteady gait which appears to be baseline for her as well.    Assessment: Given her age of 89, left-sided facial droop, slurred speech, left-sided facial numbness, I am concerned about stroke and activate a stroke code for her.  Since her last known normal was more than 6 hours ago, she is not a candidate for tPA or TNK.  However, CT angio head and neck were ordered to evaluate for occlusion.    I did consider Bell's palsy, given that she did not recently have shingles to the right arm.  However, her symptoms is mostly on the left which is not consistent with Bell's.  Either way, given her medical conditions, age, I am concerned more for stroke.    I did consider other stroke mimics such as hypoglycemia, electro abnormalities, dehydration, acute kidney injuries, etc. hence ordered appropriate lab work and imaging.    Workup: CT angio head and neck, CT perfusion head, chest x-ray, EKG, CBC, CMP, glucose, PT/INR.    Treatment: Patient is not a candidate for TNK or tPA.    I reviewed the lab work.  The patient  CMP shows a glucose of 161.  Creatinine of 0.8.  Sodium 139.  Potassium 4.5.  Bicarb 25.  Calcium 9.8.  ALT 55.  AST 42.  Total bilirubin is 0.3.  Anion gap is 12.3.  CBC shows a white count of 5.  Not consistent with severe infection or inflammation.  Hemoglobin is 14.  Platelet is 206.    I also reviewed the CT angio head and neck.  Reviewed the chest x-ray.  There is no arterial occlusion.  Discussed the case with neurologist who recommended MRI.  I then called and spoke with Dr. Jaffe, hospitalist who accepted the transfer and admission for stroke workup.  Family and patient was updated and agreed.       Please note that portions of this note were completed with a voice recognition program.         Final diagnoses:   Slurred speech   Facial droop   Left facial numbness       ED Disposition  ED Disposition       ED Disposition   Decision to Admit    Condition   --    Comment   Level of Care: Telemetry [5]   Diagnosis: Stroke [816047]   Admitting Physician: ELENA CH [[id number]]   Attending Physician: ELENA CH [[id number]]   Is patient appropriate for Inpatient Observation Unit?: No [0]                 No follow-up provider specified.       Medication List      No changes were made to your prescriptions during this visit.

## 2025-07-01 NOTE — ED NOTES
Report called to [facility], waiting to transport when room ready.  Pt resting with son and call light in reach

## 2025-07-01 NOTE — ED NOTES
"Nursing report ED to floor  Cristy Wilde  89 y.o.  female    HPI :   Chief Complaint   Patient presents with    Speech Problem       Admitting doctor:   Yonatan Phillips MD    Admitting diagnosis:   The primary encounter diagnosis was Slurred speech. Diagnoses of Facial droop and Left facial numbness were also pertinent to this visit.    Code status:   Current Code Status       Date Active Code Status Order ID Comments User Context       Prior            Allergies:   Cefaclor, Cephalexin, Levofloxacin, and Penicillins    Intake and Output  No intake or output data in the 24 hours ending 07/01/25 1947    Weight:       07/01/25 1851   Weight: 57.4 kg (126 lb 8 oz)       Most recent vitals:   Vitals:    07/01/25 1851 07/01/25 1944   BP: 138/64    Pulse: 85    Resp: 18    Temp:  97.8 °F (36.6 °C)   TempSrc:  Oral   SpO2: 95%    Weight: 57.4 kg (126 lb 8 oz)    Height: 162.6 cm (64\")        Active LDAs/IV Access:   Lines, Drains & Airways       Active LDAs       Name Placement date Placement time Site Days    Peripheral IV 07/01/25 1855 20 G Right Antecubital 07/01/25 1855  Antecubital  less than 1                    Labs (abnormal labs have a star):   Labs Reviewed   COMPREHENSIVE METABOLIC PANEL - Abnormal; Notable for the following components:       Result Value    Glucose 161 (*)     BUN 30.2 (*)     Calcium 9.8 (*)     ALT (SGPT) 55 (*)     AST (SGOT) 42 (*)     BUN/Creatinine Ratio 37.8 (*)     All other components within normal limits    Narrative:     GFR Categories in Chronic Kidney Disease (CKD)              GFR Category          GFR (mL/min/1.73)    Interpretation  G1                    90 or greater        Normal or high (1)  G2                    60-89                Mild decrease (1)  G3a                   45-59                Mild to moderate decrease  G3b                   30-44                Moderate to severe decrease  G4                    15-29                Severe decrease  G5              "       14 or less           Kidney failure    (1)In the absence of evidence of kidney disease, neither GFR category G1 or G2 fulfill the criteria for CKD.    eGFR calculation 2021 CKD-EPI creatinine equation, which does not include race as a factor   POCT GLUCOSE FINGERSTICK - Abnormal; Notable for the following components:    Glucose 149 (*)     All other components within normal limits   CBC WITH AUTO DIFFERENTIAL - Normal   RAINBOW DRAW    Narrative:     The following orders were created for panel order [place] Draw.  Procedure                               Abnormality         Status                     ---------                               -----------         ------                     Green Top (Gel)[[id number]]                                  Final result               Lavender Top[[id number]]                                     Final result               Gold Top - SST[[id number]]                                   Final result               Light Blue Top[[id number]]                                   Final result               Green Top (Gel)[[id number]]                                                               Please view results for these tests on the individual orders.   LAB PROTIME-INR, FINGERSTICK   PROTIME-INR   APTT   POCT GLUCOSE FINGERSTICK   POCT PROTIME - INR   TYPE AND SCREEN   CBC AND DIFFERENTIAL    Narrative:     The following orders were created for panel order CBC & Differential.  Procedure                               Abnormality         Status                     ---------                               -----------         ------                     CBC Auto Differential[[id number]]        Normal              Final result                 Please view results for these tests on the individual orders.   GREEN TOP   LAVENDER TOP   GOLD TOP - SST   LIGHT BLUE TOP       EKG:   ECG 12 Lead ED Triage Standing Order; Acute Stroke (Onset <24 hrs)   Preliminary Result   HEART RATE=84  bpm   RR  Pqsafrys=474  ms   ID Xqdthwnf=693  ms   P Horizontal Axis=-62  deg   P Front Axis=48  deg   QRSD Interval=96  ms   QT Hyotmnzn=585  ms   CGoS=931  ms   QRS Axis=17  deg   T Wave Axis=112  deg   - ABNORMAL ECG -   Sinus rhythm   Probable LVH with secondary repol abnrm   Date and Time of Study:2025-07-01 19:10:45          Meds given in ED:   Medications   sodium chloride 0.9 % flush 10 mL (has no administration in time range)   iopamidol (ISOVUE-370) 76 % injection 150 mL (150 mL Intravenous Given 7/1/25 1907)       Imaging results:  No radiology results for the last day    Ambulatory status:   - Standbye Assist of one    Social issues:   Social History     Socioeconomic History    Marital status:     Number of children: 2   Tobacco Use    Smoking status: Never     Passive exposure: Never    Smokeless tobacco: Never   Substance and Sexual Activity    Alcohol use: Not Currently     Comment: quit in 2018    Drug use: No    Sexual activity: Never     Partners: Male     Comment:  only       NIH Stroke Scale:  Interval: baseline     Nursing report ED to floor:

## 2025-07-02 ENCOUNTER — APPOINTMENT (OUTPATIENT)
Dept: MRI IMAGING | Facility: HOSPITAL | Age: OVER 89
End: 2025-07-02
Payer: MEDICARE

## 2025-07-02 ENCOUNTER — APPOINTMENT (OUTPATIENT)
Dept: CARDIOLOGY | Facility: HOSPITAL | Age: OVER 89
End: 2025-07-02
Payer: MEDICARE

## 2025-07-02 PROBLEM — R47.1 DYSARTHRIA: Status: ACTIVE | Noted: 2025-07-02

## 2025-07-02 PROBLEM — R29.810 FACIAL DROOP: Status: ACTIVE | Noted: 2025-07-02

## 2025-07-02 PROBLEM — F41.9 ANXIETY AND DEPRESSION: Status: ACTIVE | Noted: 2025-07-02

## 2025-07-02 PROBLEM — F32.A ANXIETY AND DEPRESSION: Status: ACTIVE | Noted: 2025-07-02

## 2025-07-02 LAB
ABO GROUP BLD: NORMAL
ALBUMIN SERPL-MCNC: 3.9 G/DL (ref 3.5–5.2)
ALBUMIN/GLOB SERPL: 1.4 G/DL
ALP SERPL-CCNC: 75 U/L (ref 39–117)
ALT SERPL W P-5'-P-CCNC: 55 U/L (ref 1–33)
ANION GAP SERPL CALCULATED.3IONS-SCNC: 11.5 MMOL/L (ref 5–15)
AORTIC DIMENSIONLESS INDEX: 0.84 (DI)
APTT PPP: 25 SECONDS (ref 22.7–35.4)
AST SERPL-CCNC: 41 U/L (ref 1–32)
AV MEAN PRESS GRAD SYS DOP V1V2: 1.27 MMHG
AV VMAX SYS DOP: 77.4 CM/SEC
BH CV ECHO MEAS - ACS: 1.86 CM
BH CV ECHO MEAS - AO MAX PG: 2.39 MMHG
BH CV ECHO MEAS - AO ROOT AREA (BSA CORRECTED): 1.9 CM2
BH CV ECHO MEAS - AO ROOT DIAM: 3.1 CM
BH CV ECHO MEAS - AO V2 VTI: 18.7 CM
BH CV ECHO MEAS - AVA(I,D): 2.35 CM2
BH CV ECHO MEAS - EDV(CUBED): 70.1 ML
BH CV ECHO MEAS - EDV(MOD-SP2): 69 ML
BH CV ECHO MEAS - EDV(MOD-SP4): 78 ML
BH CV ECHO MEAS - EF(MOD-SP2): 60.9 %
BH CV ECHO MEAS - EF(MOD-SP4): 59 %
BH CV ECHO MEAS - ESV(CUBED): 22.5 ML
BH CV ECHO MEAS - ESV(MOD-SP2): 27 ML
BH CV ECHO MEAS - ESV(MOD-SP4): 32 ML
BH CV ECHO MEAS - FS: 31.5 %
BH CV ECHO MEAS - IVS/LVPW: 1.05 CM
BH CV ECHO MEAS - IVSD: 0.89 CM
BH CV ECHO MEAS - LAT PEAK E' VEL: 5.2 CM/SEC
BH CV ECHO MEAS - LV DIASTOLIC VOL/BSA (35-75): 48.5 CM2
BH CV ECHO MEAS - LV MASS(C)D: 110.7 GRAMS
BH CV ECHO MEAS - LV MAX PG: 2.11 MMHG
BH CV ECHO MEAS - LV MEAN PG: 1.13 MMHG
BH CV ECHO MEAS - LV SYSTOLIC VOL/BSA (12-30): 19.9 CM2
BH CV ECHO MEAS - LV V1 MAX: 72.7 CM/SEC
BH CV ECHO MEAS - LV V1 VTI: 15.6 CM
BH CV ECHO MEAS - LVIDD: 4.1 CM
BH CV ECHO MEAS - LVIDS: 2.8 CM
BH CV ECHO MEAS - LVOT AREA: 2.8 CM2
BH CV ECHO MEAS - LVOT DIAM: 1.89 CM
BH CV ECHO MEAS - LVPWD: 0.85 CM
BH CV ECHO MEAS - MED PEAK E' VEL: 4.7 CM/SEC
BH CV ECHO MEAS - MR MAX PG: 148.6 MMHG
BH CV ECHO MEAS - MR MAX VEL: 609.6 CM/SEC
BH CV ECHO MEAS - MV A DUR: 0.11 SEC
BH CV ECHO MEAS - MV A MAX VEL: 80.1 CM/SEC
BH CV ECHO MEAS - MV DEC SLOPE: 236.5 CM/SEC2
BH CV ECHO MEAS - MV DEC TIME: 0.25 SEC
BH CV ECHO MEAS - MV E MAX VEL: 67.7 CM/SEC
BH CV ECHO MEAS - MV E/A: 0.84
BH CV ECHO MEAS - MV MAX PG: 2.7 MMHG
BH CV ECHO MEAS - MV MEAN PG: 1.26 MMHG
BH CV ECHO MEAS - MV P1/2T: 89.9 MSEC
BH CV ECHO MEAS - MV V2 VTI: 24.6 CM
BH CV ECHO MEAS - MVA(P1/2T): 2.45 CM2
BH CV ECHO MEAS - MVA(VTI): 1.79 CM2
BH CV ECHO MEAS - PA ACC TIME: 0.1 SEC
BH CV ECHO MEAS - PA V2 MAX: 60.4 CM/SEC
BH CV ECHO MEAS - RAP SYSTOLE: 3 MMHG
BH CV ECHO MEAS - RV MAX PG: 1.24 MMHG
BH CV ECHO MEAS - RV V1 MAX: 55.7 CM/SEC
BH CV ECHO MEAS - RV V1 VTI: 14.7 CM
BH CV ECHO MEAS - SUP REN AO DIAM: 1.6 CM
BH CV ECHO MEAS - SV(LVOT): 43.8 ML
BH CV ECHO MEAS - SV(MOD-SP2): 42 ML
BH CV ECHO MEAS - SV(MOD-SP4): 46 ML
BH CV ECHO MEAS - SVI(LVOT): 27.3 ML/M2
BH CV ECHO MEAS - SVI(MOD-SP2): 26.1 ML/M2
BH CV ECHO MEAS - SVI(MOD-SP4): 28.6 ML/M2
BH CV ECHO MEAS - TAPSE (>1.6): 1.5 CM
BH CV ECHO MEASUREMENTS AVERAGE E/E' RATIO: 13.68
BH CV XLRA - TDI S': 9.1 CM/SEC
BILIRUB SERPL-MCNC: 0.5 MG/DL (ref 0–1.2)
BLD GP AB SCN SERPL QL: NEGATIVE
BUN SERPL-MCNC: 18 MG/DL (ref 8–23)
BUN/CREAT SERPL: 25.4 (ref 7–25)
CALCIUM SPEC-SCNC: 9.2 MG/DL (ref 8.2–9.6)
CHLORIDE SERPL-SCNC: 106 MMOL/L (ref 98–107)
CHOLEST SERPL-MCNC: 199 MG/DL (ref 0–200)
CO2 SERPL-SCNC: 22.5 MMOL/L (ref 22–29)
CREAT SERPL-MCNC: 0.71 MG/DL (ref 0.57–1)
DEPRECATED RDW RBC AUTO: 43.7 FL (ref 37–54)
EGFRCR SERPLBLD CKD-EPI 2021: 80.9 ML/MIN/1.73
ERYTHROCYTE [DISTWIDTH] IN BLOOD BY AUTOMATED COUNT: 13.8 % (ref 12.3–15.4)
GLOBULIN UR ELPH-MCNC: 2.8 GM/DL
GLUCOSE BLDC GLUCOMTR-MCNC: 135 MG/DL (ref 70–130)
GLUCOSE BLDC GLUCOMTR-MCNC: 156 MG/DL (ref 70–130)
GLUCOSE BLDC GLUCOMTR-MCNC: 221 MG/DL (ref 70–130)
GLUCOSE BLDC GLUCOMTR-MCNC: 237 MG/DL (ref 70–130)
GLUCOSE BLDC GLUCOMTR-MCNC: 68 MG/DL (ref 70–130)
GLUCOSE SERPL-MCNC: 161 MG/DL (ref 65–99)
HBA1C MFR BLD: 7.1 % (ref 4.8–5.6)
HCT VFR BLD AUTO: 41 % (ref 34–46.6)
HDLC SERPL-MCNC: 65 MG/DL (ref 40–60)
HGB BLD-MCNC: 14.2 G/DL (ref 12–15.9)
INR PPP: 1.04 (ref 0.9–1.1)
LDLC SERPL CALC-MCNC: 109 MG/DL (ref 0–100)
LDLC/HDLC SERPL: 1.62 {RATIO}
LEFT ATRIUM VOLUME INDEX: 16.7 ML/M2
LV EF BIPLANE MOD: 60.5 %
MCH RBC QN AUTO: 30.6 PG (ref 26.6–33)
MCHC RBC AUTO-ENTMCNC: 34.6 G/DL (ref 31.5–35.7)
MCV RBC AUTO: 88.4 FL (ref 79–97)
PLATELET # BLD AUTO: 197 10*3/MM3 (ref 140–450)
PMV BLD AUTO: 8.3 FL (ref 6–12)
POTASSIUM SERPL-SCNC: 4 MMOL/L (ref 3.5–5.2)
PROT SERPL-MCNC: 6.7 G/DL (ref 6–8.5)
PROTHROMBIN TIME: 13.6 SECONDS (ref 11.7–14.2)
QT INTERVAL: 362 MS
QTC INTERVAL: 428 MS
RBC # BLD AUTO: 4.64 10*6/MM3 (ref 3.77–5.28)
RH BLD: NEGATIVE
SINUS: 2.42 CM
SODIUM SERPL-SCNC: 140 MMOL/L (ref 136–145)
T&S EXPIRATION DATE: NORMAL
TRIGL SERPL-MCNC: 143 MG/DL (ref 0–150)
TSH SERPL DL<=0.05 MIU/L-ACNC: 3.18 UIU/ML (ref 0.27–4.2)
VLDLC SERPL-MCNC: 25 MG/DL (ref 5–40)
WBC NRBC COR # BLD AUTO: 7.74 10*3/MM3 (ref 3.4–10.8)

## 2025-07-02 PROCEDURE — 93306 TTE W/DOPPLER COMPLETE: CPT | Performed by: INTERNAL MEDICINE

## 2025-07-02 PROCEDURE — 70551 MRI BRAIN STEM W/O DYE: CPT

## 2025-07-02 PROCEDURE — 86922 COMPATIBILITY TEST ANTIGLOB: CPT

## 2025-07-02 PROCEDURE — 86920 COMPATIBILITY TEST SPIN: CPT

## 2025-07-02 PROCEDURE — 86900 BLOOD TYPING SEROLOGIC ABO: CPT | Performed by: STUDENT IN AN ORGANIZED HEALTH CARE EDUCATION/TRAINING PROGRAM

## 2025-07-02 PROCEDURE — 83036 HEMOGLOBIN GLYCOSYLATED A1C: CPT | Performed by: NURSE PRACTITIONER

## 2025-07-02 PROCEDURE — 84443 ASSAY THYROID STIM HORMONE: CPT | Performed by: NURSE PRACTITIONER

## 2025-07-02 PROCEDURE — G0378 HOSPITAL OBSERVATION PER HR: HCPCS

## 2025-07-02 PROCEDURE — 80061 LIPID PANEL: CPT | Performed by: NURSE PRACTITIONER

## 2025-07-02 PROCEDURE — 86850 RBC ANTIBODY SCREEN: CPT | Performed by: STUDENT IN AN ORGANIZED HEALTH CARE EDUCATION/TRAINING PROGRAM

## 2025-07-02 PROCEDURE — 92610 EVALUATE SWALLOWING FUNCTION: CPT | Performed by: SPEECH-LANGUAGE PATHOLOGIST

## 2025-07-02 PROCEDURE — 85027 COMPLETE CBC AUTOMATED: CPT | Performed by: NURSE PRACTITIONER

## 2025-07-02 PROCEDURE — 93306 TTE W/DOPPLER COMPLETE: CPT

## 2025-07-02 PROCEDURE — 96360 HYDRATION IV INFUSION INIT: CPT

## 2025-07-02 PROCEDURE — 25510000001 PERFLUTREN 6.52 MG/ML SUSPENSION 2 ML VIAL: Performed by: NURSE PRACTITIONER

## 2025-07-02 PROCEDURE — 63710000001 INSULIN LISPRO (HUMAN) PER 5 UNITS: Performed by: INTERNAL MEDICINE

## 2025-07-02 PROCEDURE — 82948 REAGENT STRIP/BLOOD GLUCOSE: CPT

## 2025-07-02 PROCEDURE — 85730 THROMBOPLASTIN TIME PARTIAL: CPT | Performed by: STUDENT IN AN ORGANIZED HEALTH CARE EDUCATION/TRAINING PROGRAM

## 2025-07-02 PROCEDURE — 25810000003 SODIUM CHLORIDE 0.9 % SOLUTION: Performed by: NURSE PRACTITIONER

## 2025-07-02 PROCEDURE — 85610 PROTHROMBIN TIME: CPT | Performed by: STUDENT IN AN ORGANIZED HEALTH CARE EDUCATION/TRAINING PROGRAM

## 2025-07-02 PROCEDURE — 80053 COMPREHEN METABOLIC PANEL: CPT | Performed by: NURSE PRACTITIONER

## 2025-07-02 PROCEDURE — 99203 OFFICE O/P NEW LOW 30 MIN: CPT

## 2025-07-02 PROCEDURE — 86901 BLOOD TYPING SEROLOGIC RH(D): CPT | Performed by: STUDENT IN AN ORGANIZED HEALTH CARE EDUCATION/TRAINING PROGRAM

## 2025-07-02 RX ORDER — ONDANSETRON 2 MG/ML
4 INJECTION INTRAMUSCULAR; INTRAVENOUS EVERY 6 HOURS PRN
Status: DISCONTINUED | OUTPATIENT
Start: 2025-07-02 | End: 2025-07-03 | Stop reason: HOSPADM

## 2025-07-02 RX ORDER — ASPIRIN 300 MG/1
300 SUPPOSITORY RECTAL DAILY
Status: DISCONTINUED | OUTPATIENT
Start: 2025-07-02 | End: 2025-07-03

## 2025-07-02 RX ORDER — ACETAMINOPHEN 325 MG/1
650 TABLET ORAL EVERY 4 HOURS PRN
Status: DISCONTINUED | OUTPATIENT
Start: 2025-07-02 | End: 2025-07-03 | Stop reason: HOSPADM

## 2025-07-02 RX ORDER — ACETAMINOPHEN 650 MG/1
650 SUPPOSITORY RECTAL EVERY 4 HOURS PRN
Status: DISCONTINUED | OUTPATIENT
Start: 2025-07-02 | End: 2025-07-03 | Stop reason: HOSPADM

## 2025-07-02 RX ORDER — ASPIRIN 325 MG
325 TABLET ORAL DAILY
Status: DISCONTINUED | OUTPATIENT
Start: 2025-07-02 | End: 2025-07-03

## 2025-07-02 RX ORDER — SODIUM CHLORIDE 9 MG/ML
75 INJECTION, SOLUTION INTRAVENOUS CONTINUOUS
Status: ACTIVE | OUTPATIENT
Start: 2025-07-02 | End: 2025-07-03

## 2025-07-02 RX ORDER — BISACODYL 10 MG
10 SUPPOSITORY, RECTAL RECTAL DAILY PRN
Status: DISCONTINUED | OUTPATIENT
Start: 2025-07-02 | End: 2025-07-03 | Stop reason: HOSPADM

## 2025-07-02 RX ORDER — IBUPROFEN 600 MG/1
1 TABLET ORAL
Status: DISCONTINUED | OUTPATIENT
Start: 2025-07-02 | End: 2025-07-03 | Stop reason: HOSPADM

## 2025-07-02 RX ORDER — INSULIN LISPRO 100 [IU]/ML
2-7 INJECTION, SOLUTION INTRAVENOUS; SUBCUTANEOUS
Status: DISCONTINUED | OUTPATIENT
Start: 2025-07-02 | End: 2025-07-03 | Stop reason: HOSPADM

## 2025-07-02 RX ORDER — DEXTROSE MONOHYDRATE 25 G/50ML
25 INJECTION, SOLUTION INTRAVENOUS
Status: DISCONTINUED | OUTPATIENT
Start: 2025-07-02 | End: 2025-07-03 | Stop reason: HOSPADM

## 2025-07-02 RX ORDER — ATORVASTATIN CALCIUM 80 MG/1
80 TABLET, FILM COATED ORAL NIGHTLY
Status: DISCONTINUED | OUTPATIENT
Start: 2025-07-02 | End: 2025-07-03

## 2025-07-02 RX ORDER — LABETALOL HYDROCHLORIDE 5 MG/ML
10 INJECTION, SOLUTION INTRAVENOUS
Status: DISCONTINUED | OUTPATIENT
Start: 2025-07-02 | End: 2025-07-03 | Stop reason: HOSPADM

## 2025-07-02 RX ORDER — TRAZODONE HYDROCHLORIDE 50 MG/1
25 TABLET ORAL NIGHTLY PRN
Status: DISCONTINUED | OUTPATIENT
Start: 2025-07-02 | End: 2025-07-03 | Stop reason: HOSPADM

## 2025-07-02 RX ORDER — NICOTINE POLACRILEX 4 MG
15 LOZENGE BUCCAL
Status: DISCONTINUED | OUTPATIENT
Start: 2025-07-02 | End: 2025-07-03 | Stop reason: HOSPADM

## 2025-07-02 RX ORDER — ESCITALOPRAM OXALATE 10 MG/1
10 TABLET ORAL DAILY
Status: DISCONTINUED | OUTPATIENT
Start: 2025-07-02 | End: 2025-07-03 | Stop reason: HOSPADM

## 2025-07-02 RX ADMIN — ESCITALOPRAM 10 MG: 10 TABLET, FILM COATED ORAL at 11:15

## 2025-07-02 RX ADMIN — Medication 5 MG: at 20:27

## 2025-07-02 RX ADMIN — ATORVASTATIN CALCIUM 80 MG: 80 TABLET, FILM COATED ORAL at 20:27

## 2025-07-02 RX ADMIN — INSULIN LISPRO 3 UNITS: 100 INJECTION, SOLUTION INTRAVENOUS; SUBCUTANEOUS at 18:13

## 2025-07-02 RX ADMIN — PERFLUTREN 3 ML: 6.52 INJECTION, SUSPENSION INTRAVENOUS at 10:11

## 2025-07-02 RX ADMIN — TRAZODONE HYDROCHLORIDE 25 MG: 50 TABLET ORAL at 20:27

## 2025-07-02 RX ADMIN — INSULIN LISPRO 3 UNITS: 100 INJECTION, SOLUTION INTRAVENOUS; SUBCUTANEOUS at 12:48

## 2025-07-02 RX ADMIN — ASPIRIN 325 MG ORAL TABLET 325 MG: 325 PILL ORAL at 11:15

## 2025-07-02 RX ADMIN — SODIUM CHLORIDE 75 ML/HR: 9 INJECTION, SOLUTION INTRAVENOUS at 01:55

## 2025-07-02 RX ADMIN — SODIUM CHLORIDE 75 ML/HR: 9 INJECTION, SOLUTION INTRAVENOUS at 23:03

## 2025-07-02 NOTE — SIGNIFICANT NOTE
07/02/25 1515   OTHER   Discipline physical therapist   Rehab Time/Intention   Session Not Performed other (see comments)  (PT waiting neuro recs. Stroke work up in progress. PT will follow up as appropriate.)   Therapy Assessment/Plan (PT)   Criteria for Skilled Interventions Met (PT) yes   Recommendation   PT - Next Appointment 07/03/25

## 2025-07-02 NOTE — SIGNIFICANT NOTE
07/02/25 1426   OTHER   Discipline occupational therapist   Rehab Time/Intention   Session Not Performed other (see comments)  (OT waiting neuro recs. Stroke work up in progress. OT to f/u 7/3.)   Recommendation   OT - Next Appointment 07/03/25

## 2025-07-02 NOTE — CONSULTS
Neurology Consult Note  [facility]       Consult Date: 2025    Referring MD: Shantal John MD    Reason for Consult I have been asked to see the patient in neurological consultation to render advice and opinion regarding stroke.  HPI         Cristy Wilde is a 89 y.o. female with a history of type 2 diabetes, hyperlipidemia, osteoporosis, and left cerebellar stroke in May 2019 without deficits, who presented to [facility] ED on 25  with dysarthria and left-sided facial droop, that started that morning. Patient history came from chart, patient is a bad historian.     NIHSS      NIH Stroke Scale  Time: 10:30 AM  Person Administering Scale: DIANNE Otto    1a  Level of consciousness: 0=alert; keenly responsive   1b. LOC questions:  0=Performs both tasks correctly   1c. LOC commands: 0=Performs both tasks correctly   2.  Best Gaze: 0=normal   3.  Visual: 0=No visual loss   4. Facial Palsy: 2=Partial paralysis (total or near total paralysis of the lower face)   5a.  Motor left arm: 0=No drift, limb holds 90 (or 45) degrees for full 10 seconds   5b.  Motor right arm: 0=No drift, limb holds 90 (or 45) degrees for full 10 seconds   6a. motor left le=No drift, limb holds 90 (or 45) degrees for full 10 seconds   6b  Motor right le=No drift, limb holds 90 (or 45) degrees for full 10 seconds   7. Limb Ataxia: 0=Absent   8.  Sensory: 0=Normal; no sensory loss   9. Best Language:  0=No aphasia, normal   10. Dysarthria: 1=Mild to moderate, patient slurs at least some words and at worst, can be understood with some difficulty   11. Extinction and Inattention: 0=No abnormality    Total:   3                 Past  Medical/Surgical History     Past Medical History:   Diagnosis Date    Chronic constipation     Chronic rhinitis     with noted vasomotor rhinitis    Diabetes mellitus     Diverticulosis     Dry eyes     sees dr norton    Globus sensation     Hearing loss      noted on 2015 audiology testing    History of allergic rhinitis     History of onychomycosis     Internal hemorrhoids     grade 2    Laryngitis, chronic     New infarction of cerebellum 5/14/2019    Osteoarthritis, chronic     minimal sx controlled with exercise    Osteoporosis     Pancreatitis due to biliary obstruction 2013    relieved after lap lacho    Patient had no falls in past year     Postpartum depression     Prediabetes     Reaction, adjustment, with anxious, depressed mood     Stroke     Vertigo 9/11/2018    Recurrent, suspect BPPV     Past Surgical History:   Procedure Laterality Date    CATARACT EXTRACTION      CATARACT EXTRACTION BILATERAL W/ ANTERIOR VITRECTOMY  2009    CHOLECYSTECTOMY  2013    DILATION AND CURETTAGE, DIAGNOSTIC / THERAPEUTIC  1986    EYE PTOSIS REPAIR Bilateral 2015    REFRACTIVE SURGERY      2015 L and 2016 R    TEAR DUCT SURGERY      2007 R and 2014 L; Dr. Davis    TONSILLECTOMY  1957       Medications        Medications On Admission  Medications Prior to Admission   Medication Sig Dispense Refill Last Dose/Taking    aspirin 81 MG EC tablet Take 1 tablet by mouth Daily. 30 tablet 0 7/1/2025 Evening    atorvastatin (LIPITOR) 20 MG tablet TAKE ONE TABLET BY MOUTH DAILY 90 tablet 0 7/1/2025    Calcium Citrate-Vitamin D3 (CITRACAL) 315-6.25 MG-MCG tablet tablet Take 2 tablets by mouth 2 (Two) Times a Day.   7/1/2025 Evening    Cholecalciferol (VITAMIN D3) 75 MCG (3000 UT) tablet Take 3,000 Units by mouth Daily.   7/1/2025 Evening    docusate sodium (COLACE) 100 MG capsule Take 3 capsules by mouth Every Night.   7/1/2025    escitalopram (LEXAPRO) 10 MG tablet TAKE ONE TABLET BY MOUTH DAILY 90 tablet 1 7/1/2025    Magnesium 250 MG tablet Take 1 tablet by mouth Every Night.   7/1/2025 Evening    metFORMIN ER (GLUCOPHAGE-XR) 500 MG 24 hr tablet Take 1 tablet by mouth Daily With Breakfast. 90 tablet 0 7/1/2025    vitamin B-12 (CYANOCOBALAMIN) 1000 MCG tablet Take 1 tablet by mouth Daily.  "30 tablet 5 7/1/2025 Evening    cycloSPORINE (RESTASIS) 0.05 % ophthalmic emulsion Administer 1 drop to both eyes 2 (Two) Times a Day.   Unknown    denosumab (PROLIA) 60 MG/ML solution syringe Inject 1 mL under the skin into the appropriate area as directed Every 6 (Six) Months.   More than a month    Lancet Devices (ONE TOUCH DELICA LANCING DEV) misc 1 each Daily. DX: E11.9 TYPE 2 DM 1 each 1     Lancet Devices (OneTouch Delica Plus Lancing) misc 1 each Daily. USE ONETOUCH DELICA PLUS LANCING DEVICE ONCE DAILY FOR GLUCOSE MONITOR  DX: E11.9 1 each 11     OneTouch Ultra test strip USE ONE STRIP TO TEST DAILY 100 each 3        Allergies     Allergies:  Allergies   Allergen Reactions    Cefaclor GI Intolerance    Cephalexin GI Intolerance    Levofloxacin Unknown - Low Severity    Penicillins Unknown - Low Severity     Social Hx:  Social History     Socioeconomic History    Marital status:     Number of children: 2   Tobacco Use    Smoking status: Never     Passive exposure: Never    Smokeless tobacco: Never   Vaping Use    Vaping status: Never Used   Substance and Sexual Activity    Alcohol use: Not Currently     Comment: quit in 2018    Drug use: No    Sexual activity: Never     Partners: Male     Comment:  only       Family Hx:  Family History   Problem Relation Age of Onset    Diabetes Mother     Heart disease Mother     Depression Father     Parkinsonism Father     Diabetes Sister     Alzheimer's disease Brother     Diabetes Brother     Diabetes Maternal Grandmother     Diabetes Brother     Prostate cancer Brother     Diabetes Sister     No Known Problems Daughter     No Known Problems Son          Exam          /74   Pulse 74   Temp 98 °F (36.7 °C) (Oral)   Resp 18   Ht 162.6 cm (64\")   Wt 57.2 kg (126 lb)   LMP  (LMP Unknown)   SpO2 97%   BMI 21.63 kg/m²   Gen: NAD, vitals reviewed  MS: oriented x3, recent/remote memory intact, dementia at baseline, normal attention/concentration, " language intact, no neglect.  CN: visual acuity grossly normal, PERRL, EOMI, moderate left facial droop, moderate dysarthria  Motor: 5/5 throughout upper and lower extremities, normal tone, mild weakness of the left upper and lower extremities  Sensory: intact to light touch all 4 ext.        Lab Review      Lab Results   Component Value Date    GLUCOSE 161 (H) 07/02/2025    CALCIUM 9.2 07/02/2025     07/02/2025    K 4.0 07/02/2025    CO2 22.5 07/02/2025     07/02/2025    BUN 18.0 07/02/2025    CREATININE 0.71 07/02/2025    EGFRIFAFRI 92 01/15/2021    EGFRIFNONA 67 11/30/2021    BCR 25.4 (H) 07/02/2025    ANIONGAP 11.5 07/02/2025     Lab Results   Component Value Date    WBC 7.74 07/02/2025    HGB 14.2 07/02/2025    HCT 41.0 07/02/2025    MCV 88.4 07/02/2025     07/02/2025     Lab Results   Component Value Date     (H) 07/02/2025    LDL 58 07/11/2023    LDL 73 01/05/2023     Lab Results   Component Value Date    HGBA1C 7.10 (H) 07/02/2025     Lab Results   Component Value Date    INR 1.04 07/02/2025    INR 1.00 07/01/2025    INR 1.00 05/13/2019    PROTIME 13.6 07/02/2025    PROTIME 12.1 07/01/2025    PROTIME 12.9 05/13/2019       HDL 65      Imaging    MRI Brain Without Contrast  Result Date: 7/2/2025  MRI OF THE BRAIN WITHOUT CONTRAST ON 07/02/2025  CLINICAL HISTORY: This is a 89-year-old female patient with acute strokelike symptoms with numbness and tingling on left side of her lip.  TECHNIQUE: Axial T1, FLAIR, fat-suppressed T2, axial diffusion and gradient echo T2 and sagittal T1-weighted images were obtained of the entire head.  This is correlated to a contrast-enhanced CT angiogram of the head and neck and CT perfusion study of the head yesterday evening on 07/01/2025 at 6:54 p.m. and a prior MRI of the brain on 05/13/2019.  FINDINGS: There are patchy nodular and confluent areas of T2 high signal throughout the periventricular and subcortical white matter of the cerebral  hemispheres consistent with moderate-to-severe small vessel disease. There is a 15 x 10 mm chronic infarct in the superior medial left cerebellum in the left superior cerebellar artery territory that occurred back in 05/2019. There is a 10 x 5 mm old lateral right cerebellar infarct in the right PICA territory, 2 separate tiny 4-5 mm old posterior lateral left cerebellar infarcts in the left PICA territory, unchanged since MRI 05/13/2019. There is a triangular area of intense increased signal intensity on the diffusion-weighted images that measures 13 x 11 mm in anterior posterior and medial lateral dimension. It extends from the mid right corona radiata region into the posterior right putamen and involves the lateral fibers of the posterior limb of the right internal capsule and the medial fibers of the right posterior external capsule and is consistent with an acute lacunar-type infarct. The remainder of the brain parenchyma is normal in signal intensity. There is diffuse cerebral atrophy. The lateral and third ventricles are prominent in size, felt to be due to central volume loss or atrophy. I see no mass effect and no midline shift and no extra-axial fluid collections are identified. The calvarium and skull base are normal in appearance. There are lens implants in the globes from previous cataract surgery. Otherwise, the orbits are normal in appearance. The paranasal sinuses and the mastoid air cells and the middle ear cavities are clear.      1. There is an acute lacunar-type infarct measuring 13 x 11 mm in anterior posterior and medial lateral dimensions that extends from the mid right corona radiata region into the posterior right putamen and it involves the lateral fibers of the posterior limb of the right internal capsule and the medial fibers of the posterior right external capsule and likely accounts for the patient's acute left-sided symptoms.  2. The remainder of the MRI of the brain is unchanged when  compared to MRI of the brain 05/13/2019. There is moderate-to-severe small vessel disease in the cerebral white matter. There is a 15 x 10 mm chronic infarct in the superior medial left cerebellum in the left superior cerebellar artery territory that occurred back in 05/2019. There is a 10 x 5 mm old lateral right cerebellar infarct in the right PICA territory and 2 separate tiny 4-5 mm old posterior lateral left cerebellar infarcts in the left PICA territory. There is diffuse cerebral atrophy.  The results of this study were communicated Dr. Hernandez from Stroke Neurology by telephone on 07/02/2025 at 7:40 a.m.      XR Chest 1 View  Result Date: 7/1/2025  XR CHEST 1 VW-  Clinical: Acute stroke protocol  COMPARISON examination 7/31/2020  FINDINGS: There is cardiomegaly. Diffuse increase in the overall interstitial pattern most consistent with vascular congestion, diffuse atypical pneumonia possible. Mediastinum is satisfactory in appearance. There is a vague area of opacity demonstrated at the left lung base, superimposed consolidation or pleural effusion. Favor the latter. The remainder is unremarkable.  This report was finalized on 7/1/2025 8:27 PM by Dr. Alexis Crawley M.D on Workstation: [id number]      CT Angiogram Head w AI Analysis of LVO  Result Date: 7/1/2025  HEAD CT WITHOUT CONTRAST, HEAD & NECK CTA WITH CONTRAST, PRE 7 POST CONTRAST HEAD PERFUSION CT  INDICATION: Slurred speech and difficulty swallowing.  TECHNIQUE: Unenhanced head CT, followed by CT angiogram of the head and neck with IV contrast. 3-D postprocessing was performed and reviewed.  Evaluation for a significant carotid arterial stenosis is based on the NASCET criteria. Axial CT images of the brain without and with IV contrast using cerebral perfusion protocol. Post-processing parametric maps were created using RAPID software and reviewed.  Radiation dose reduction techniques were utilized, including automated exposure control, and exposure  modulation based on body size.  COMPARISON: Head and neck CT angiogram 5/13/2019  FINDINGS:  Head CT: There is volume loss, but there is no CT evidence of acute intracranial hemorrhage, mass, or infarct. There is no hydrocephalus or extra-axial fluid collection.  There are moderately extensive senescent changes, but there is no evidence of acute intracranial abnormality.  CTA neck: There is a normal aortic arch branching pattern without proximal great vessel stenosis. Both vertebral arteries are patent throughout the neck, and supply the basilar artery.  Both common carotid arteries are normally patent. There is plaque at both cervical carotid bifurcations, with 0% stenosis in both internal carotid arteries.  CTA head:  There is symmetric distal intracranial runoff in the anterior, middle, and posterior cerebral artery territories.  There is no evidence of intracranial aneurysm, or of high-grade intracranial flow-limiting stenosis.  There is no evidence of branch vessel occlusion, or region or zone of hypoperfusion.  The dural venous sinuses appear normal.  Extravascular structures: There is no intracranial mass or abnormal enhancement.   The extracranial and cervical soft tissue structures show no acute abnormality.  The visualized lung apices show no acute abnormality.  There are cervical spinal degenerative changes, but there is no acute bony abnormality.  Ischemic tissue volume (Tmax > 6 s): 0 mL Ischemic core volume (rCBF < 30%): 0 mL Mismatch (penumbra) volume 0 mL, ratio N/A Volume of poor collateral perfusion (Tmax > 10 s): 0 mL Hypoperfusion index (Tmax > 10 s /Tmax > 6 s): N/A CBV index: N/A       Chronic changes as noted above, no acute intracranial abnormality.  Neck CTA shows 0% stenosis in both internal carotid arteries and patent vertebral arteries bilaterally.  Head CTA shows no evidence of acute intracranial vascular abnormality.  Normal, symmetric cerebral perfusion. No evidence of acute infarct or  ischemia.  This report was finalized on 7/1/2025 7:47 PM by Dr. Brandon Marin M.D on Workstation: [id number]      CT Angiogram Neck  Result Date: 7/1/2025  HEAD CT WITHOUT CONTRAST, HEAD & NECK CTA WITH CONTRAST, PRE 7 POST CONTRAST HEAD PERFUSION CT  INDICATION: Slurred speech and difficulty swallowing.  TECHNIQUE: Unenhanced head CT, followed by CT angiogram of the head and neck with IV contrast. 3-D postprocessing was performed and reviewed.  Evaluation for a significant carotid arterial stenosis is based on the NASCET criteria. Axial CT images of the brain without and with IV contrast using cerebral perfusion protocol. Post-processing parametric maps were created using RAPID software and reviewed.  Radiation dose reduction techniques were utilized, including automated exposure control, and exposure modulation based on body size.  COMPARISON: Head and neck CT angiogram 5/13/2019  FINDINGS:  Head CT: There is volume loss, but there is no CT evidence of acute intracranial hemorrhage, mass, or infarct. There is no hydrocephalus or extra-axial fluid collection.  There are moderately extensive senescent changes, but there is no evidence of acute intracranial abnormality.  CTA neck: There is a normal aortic arch branching pattern without proximal great vessel stenosis. Both vertebral arteries are patent throughout the neck, and supply the basilar artery.  Both common carotid arteries are normally patent. There is plaque at both cervical carotid bifurcations, with 0% stenosis in both internal carotid arteries.  CTA head:  There is symmetric distal intracranial runoff in the anterior, middle, and posterior cerebral artery territories.  There is no evidence of intracranial aneurysm, or of high-grade intracranial flow-limiting stenosis.  There is no evidence of branch vessel occlusion, or region or zone of hypoperfusion.  The dural venous sinuses appear normal.  Extravascular structures: There is no intracranial mass  or abnormal enhancement.   The extracranial and cervical soft tissue structures show no acute abnormality.  The visualized lung apices show no acute abnormality.  There are cervical spinal degenerative changes, but there is no acute bony abnormality.  Ischemic tissue volume (Tmax > 6 s): 0 mL Ischemic core volume (rCBF < 30%): 0 mL Mismatch (penumbra) volume 0 mL, ratio N/A Volume of poor collateral perfusion (Tmax > 10 s): 0 mL Hypoperfusion index (Tmax > 10 s /Tmax > 6 s): N/A CBV index: N/A       Chronic changes as noted above, no acute intracranial abnormality.  Neck CTA shows 0% stenosis in both internal carotid arteries and patent vertebral arteries bilaterally.  Head CTA shows no evidence of acute intracranial vascular abnormality.  Normal, symmetric cerebral perfusion. No evidence of acute infarct or ischemia.  This report was finalized on 7/1/2025 7:47 PM by Dr. Brandon Marin M.D on Workstation: [id number]      CT CEREBRAL PERFUSION WITH & WITHOUT CONTRAST  Result Date: 7/1/2025  HEAD CT WITHOUT CONTRAST, HEAD & NECK CTA WITH CONTRAST, PRE 7 POST CONTRAST HEAD PERFUSION CT  INDICATION: Slurred speech and difficulty swallowing.  TECHNIQUE: Unenhanced head CT, followed by CT angiogram of the head and neck with IV contrast. 3-D postprocessing was performed and reviewed.  Evaluation for a significant carotid arterial stenosis is based on the NASCET criteria. Axial CT images of the brain without and with IV contrast using cerebral perfusion protocol. Post-processing parametric maps were created using RAPID software and reviewed.  Radiation dose reduction techniques were utilized, including automated exposure control, and exposure modulation based on body size.  COMPARISON: Head and neck CT angiogram 5/13/2019  FINDINGS:  Head CT: There is volume loss, but there is no CT evidence of acute intracranial hemorrhage, mass, or infarct. There is no hydrocephalus or extra-axial fluid collection.  There are  moderately extensive senescent changes, but there is no evidence of acute intracranial abnormality.  CTA neck: There is a normal aortic arch branching pattern without proximal great vessel stenosis. Both vertebral arteries are patent throughout the neck, and supply the basilar artery.  Both common carotid arteries are normally patent. There is plaque at both cervical carotid bifurcations, with 0% stenosis in both internal carotid arteries.  CTA head:  There is symmetric distal intracranial runoff in the anterior, middle, and posterior cerebral artery territories.  There is no evidence of intracranial aneurysm, or of high-grade intracranial flow-limiting stenosis.  There is no evidence of branch vessel occlusion, or region or zone of hypoperfusion.  The dural venous sinuses appear normal.  Extravascular structures: There is no intracranial mass or abnormal enhancement.   The extracranial and cervical soft tissue structures show no acute abnormality.  The visualized lung apices show no acute abnormality.  There are cervical spinal degenerative changes, but there is no acute bony abnormality.  Ischemic tissue volume (Tmax > 6 s): 0 mL Ischemic core volume (rCBF < 30%): 0 mL Mismatch (penumbra) volume 0 mL, ratio N/A Volume of poor collateral perfusion (Tmax > 10 s): 0 mL Hypoperfusion index (Tmax > 10 s /Tmax > 6 s): N/A CBV index: N/A       Chronic changes as noted above, no acute intracranial abnormality.  Neck CTA shows 0% stenosis in both internal carotid arteries and patent vertebral arteries bilaterally.  Head CTA shows no evidence of acute intracranial vascular abnormality.  Normal, symmetric cerebral perfusion. No evidence of acute infarct or ischemia.  This report was finalized on 7/1/2025 7:47 PM by Dr. Brandno Marin M.D on Workstation: [id number]         Diagnosis      Lacunar Stroke   Small Vessel Disease  Hyperlipidemia       Assessment/Plan        Cristy Wilde is a 89 y.o. female, who presented  to [facility] ED on 7/1/25 with dysarthria and left-sided facial droop, that started that morning.  CTA of head and neck, CT Cerebral perfusion with team D imaging and no significant vascular stenosis or perfusion deficit.  Since MRI brain has been completed and shows an acute right corona radiata stroke consistent with small vessel etiology. Continue with therapies, dual antiplatelet therapy and high intensity statin. Following neuro exams.    Plan    Asa, plavix high intensity statin 40 mg of Lipitor  Permissive htn up to 220<110  Serial neuroexams  Stroke education  A1c goal <6.5%  LDL <70  TEDs/SCDs  Therapies as written        Neuroteam will be following with you    Recommendations discussed with family.  I spent 50-75 minutes caring for the patient on this date of service. This time includes time spent by me in the following activities:preparing for the visit, reviewing tests, obtaining and/or reviewing a separately obtained history, performing a medically appropriate examination and/or evaluation , counseling and educating the patient/family/caregiver, ordering medications, tests, or procedures, referring and communicating with other health care professionals , documenting information in the medical record, independently interpreting results and communicating that information with the patient/family/caregiver, and care coordination      DIANNE Otto

## 2025-07-02 NOTE — H&P
Patient Name:  Cristy Wilde  YOB: 1934  MRN:  [id number]  Admit Date:  7/1/2025  Patient Care Team:  Shantal John MD as PCP - General (Geriatric Medicine)  Jerardo Kaur MD as PCP - Family Medicine  Albino Childs MD as Consulting Physician (Ophthalmology)  MAE Daivs MD as Consulting Physician (Ophthalmology)      Subjective   History Present Illness     Chief Complaint   Patient presents with    Speech Problem       Ms. Wilde is a 89 y.o. non-smoker with a history of Type 2 DM, CVA, and cognitive dysfunction that presents to [facility] complaining of left lower facial weakness and dysarthria which started yesterday. She initially presented to the ER at [facility] and was transferred to this facility for further workup and treatment. She is feeling much better today.     Review of Systems   All other systems reviewed and are negative.       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    Osteoarthritis, chronic     minimal sx controlled with exercise    Osteoporosis     Pancreatitis due to biliary obstruction 2013    relieved after lap lacho    Patient had no falls in past year     Postpartum depression     Prediabetes     Reaction, adjustment, with anxious, depressed mood     Stroke     Vertigo 9/11/2018    Recurrent, suspect BPPV     Past Surgical History:   Procedure Laterality Date    CATARACT EXTRACTION      CATARACT EXTRACTION BILATERAL W/ ANTERIOR VITRECTOMY  2009    CHOLECYSTECTOMY  2013    DILATION AND CURETTAGE, DIAGNOSTIC / THERAPEUTIC   1986    EYE PTOSIS REPAIR Bilateral 2015    REFRACTIVE SURGERY      2015 L and 2016 R    TEAR DUCT SURGERY      2007 R and 2014 L; Dr. Davis    TONSILLECTOMY  1957     Family History   Problem Relation Age of Onset    Diabetes Mother     Heart disease Mother     Depression Father     Parkinsonism Father     Diabetes Sister     Alzheimer's disease Brother     Diabetes Brother     Diabetes Maternal Grandmother     Diabetes Brother     Prostate cancer Brother     Diabetes Sister     No Known Problems Daughter     No Known Problems Son      Social History     Tobacco Use    Smoking status: Never     Passive exposure: Never    Smokeless tobacco: Never   Vaping Use    Vaping status: Never Used   Substance Use Topics    Alcohol use: Not Currently     Comment: quit in 2018    Drug use: No     No current facility-administered medications on file prior to encounter.     Current Outpatient Medications on File Prior to Encounter   Medication Sig Dispense Refill    aspirin 81 MG EC tablet Take 1 tablet by mouth Daily. 30 tablet 0    atorvastatin (LIPITOR) 20 MG tablet TAKE ONE TABLET BY MOUTH DAILY 90 tablet 0    Calcium Citrate-Vitamin D3 (CITRACAL) 315-6.25 MG-MCG tablet tablet Take 2 tablets by mouth 2 (Two) Times a Day.      Cholecalciferol (VITAMIN D3) 75 MCG (3000 UT) tablet Take 3,000 Units by mouth Daily.      docusate sodium (COLACE) 100 MG capsule Take 3 capsules by mouth Every Night.      escitalopram (LEXAPRO) 10 MG tablet TAKE ONE TABLET BY MOUTH DAILY 90 tablet 1    Magnesium 250 MG tablet Take 1 tablet by mouth Every Night.      metFORMIN ER (GLUCOPHAGE-XR) 500 MG 24 hr tablet Take 1 tablet by mouth Daily With Breakfast. 90 tablet 0    vitamin B-12 (CYANOCOBALAMIN) 1000 MCG tablet Take 1 tablet by mouth Daily. 30 tablet 5    cycloSPORINE (RESTASIS) 0.05 % ophthalmic emulsion Administer 1 drop to both eyes 2 (Two) Times a Day.      denosumab (PROLIA) 60 MG/ML solution syringe Inject 1 mL under the skin into the  appropriate area as directed Every 6 (Six) Months.      Lancet Devices (ONE TOUCH DELICA LANCING DEV) misc 1 each Daily. DX: E11.9 TYPE 2 DM 1 each 1    Lancet Devices (OneTouch Delica Plus Lancing) misc 1 each Daily. USE ONETOUCH DELICA PLUS LANCING DEVICE ONCE DAILY FOR GLUCOSE MONITOR  DX: E11.9 1 each 11    OneTouch Ultra test strip USE ONE STRIP TO TEST DAILY 100 each 3     Allergies   Allergen Reactions    Cefaclor GI Intolerance    Cephalexin GI Intolerance    Levofloxacin Unknown - Low Severity    Penicillins Unknown - Low Severity       Objective    Objective     Vital Signs  Temp:  [97.7 °F (36.5 °C)-98 °F (36.7 °C)] 98 °F (36.7 °C)  Heart Rate:  [71-89] 74  Resp:  [18] 18  BP: (138-164)/(64-81) 156/72  SpO2:  [93 %-97 %] 97 %  on   ;   Device (Oxygen Therapy): room air  Body mass index is 21.71 kg/m².    Physical Exam  Vitals and nursing note reviewed.   Constitutional:       General: She is not in acute distress.     Appearance: She is not toxic-appearing or diaphoretic.   HENT:      Head: Normocephalic and atraumatic.      Mouth/Throat:      Mouth: Mucous membranes are moist.      Pharynx: Oropharynx is clear.   Eyes:      Conjunctiva/sclera: Conjunctivae normal.      Pupils: Pupils are equal, round, and reactive to light.   Cardiovascular:      Rate and Rhythm: Normal rate and regular rhythm.      Pulses: Normal pulses.   Pulmonary:      Effort: Pulmonary effort is normal.      Breath sounds: Normal breath sounds.   Abdominal:      General: Bowel sounds are normal. There is no distension.      Palpations: Abdomen is soft.      Tenderness: There is no abdominal tenderness.   Musculoskeletal:         General: No swelling or tenderness.      Cervical back: Neck supple.   Skin:     General: Skin is warm and dry.      Capillary Refill: Capillary refill takes less than 2 seconds.   Neurological:      Mental Status: She is alert.      Cranial Nerves: Dysarthria (mild) present.      Motor: Motor function is  intact.      Comments: Subtle left facial droop   Psychiatric:         Mood and Affect: Mood and affect normal.         Results Review:  I reviewed the patient's new clinical results.  I reviewed the patient's new imaging results and agree with the interpretation.  I reviewed the patient's other test results and agree with the interpretation  I personally viewed and interpreted the patient's EKG/Telemetry data    Lab Results (last 24 hours)       Procedure Component Value Units Date/Time    POC Glucose Once [[id number]]  (Abnormal) Collected: 07/01/25 1851    Specimen: Blood Updated: 07/01/25 1902     Glucose 149 mg/dL     CBC & Differential [[id number]]  (Normal) Collected: 07/01/25 1853    Specimen: Blood Updated: 07/01/25 1857    Narrative:      The following orders were created for panel order CBC & Differential.  Procedure                               Abnormality         Status                     ---------                               -----------         ------                     CBC Auto Differential[[id number]]        Normal              Final result                 Please view results for these tests on the individual orders.    Comprehensive Metabolic Panel [[id number]]  (Abnormal) Collected: 07/01/25 1853    Specimen: Blood Updated: 07/01/25 1917     Glucose 161 mg/dL      BUN 30.2 mg/dL      Creatinine 0.80 mg/dL      Sodium 139 mmol/L      Potassium 4.5 mmol/L      Chloride 102 mmol/L      CO2 24.7 mmol/L      Calcium 9.8 mg/dL      Total Protein 6.9 g/dL      Albumin 4.3 g/dL      ALT (SGPT) 55 U/L      AST (SGOT) 42 U/L      Alkaline Phosphatase 97 U/L      Total Bilirubin 0.3 mg/dL      Globulin 2.6 gm/dL      A/G Ratio 1.7 g/dL      BUN/Creatinine Ratio 37.8     Anion Gap 12.3 mmol/L      eGFR 70.1 mL/min/1.73     Narrative:      GFR Categories in Chronic Kidney Disease (CKD)              GFR Category          GFR (mL/min/1.73)    Interpretation  G1                    90 or greater        Normal or  high (1)  G2                    60-89                Mild decrease (1)  G3a                   45-59                Mild to moderate decrease  G3b                   30-44                Moderate to severe decrease  G4                    15-29                Severe decrease  G5                    14 or less           Kidney failure    (1)In the absence of evidence of kidney disease, neither GFR category G1 or G2 fulfill the criteria for CKD.    eGFR calculation 2021 CKD-EPI creatinine equation, which does not include race as a factor    CBC Auto Differential [[id number]]  (Normal) Collected: 07/01/25 1853    Specimen: Blood Updated: 07/01/25 1857     WBC 5.60 10*3/mm3      RBC 4.75 10*6/mm3      Hemoglobin 14.3 g/dL      Hematocrit 43.1 %      MCV 90.7 fL      MCH 30.1 pg      MCHC 33.2 g/dL      RDW 14.5 %      RDW-SD 48.6 fl      MPV 8.6 fL      Platelets 206 10*3/mm3      Neutrophil % 64.5 %      Lymphocyte % 21.8 %      Monocyte % 11.3 %      Eosinophil % 1.8 %      Basophil % 0.4 %      Immature Grans % 0.2 %      Neutrophils, Absolute 3.62 10*3/mm3      Lymphocytes, Absolute 1.22 10*3/mm3      Monocytes, Absolute 0.63 10*3/mm3      Eosinophils, Absolute 0.10 10*3/mm3      Basophils, Absolute 0.02 10*3/mm3      Immature Grans, Absolute 0.01 10*3/mm3     Protime-INR, Fingerstick [[id number]] Collected: 07/01/25 1927    Specimen: Capillary Blood Updated: 07/01/25 1930     Protime 12.1 Seconds      Comment: Serial Number: [id number]:  [id number]        INR 1.00    POC Glucose Once [[id number]]  (Abnormal) Collected: 07/02/25 0609    Specimen: Blood Updated: 07/02/25 0611     Glucose 156 mg/dL     Protime-INR [[id number]]  (Normal) Collected: 07/02/25 0812    Specimen: Blood Updated: 07/02/25 0851     Protime 13.6 Seconds      INR 1.04    aPTT [[id number]]  (Normal) Collected: 07/02/25 0812    Specimen: Blood Updated: 07/02/25 0851     PTT 25.0 seconds     CBC (No Diff) [[id number]]  (Normal) Collected: 07/02/25 0812     Specimen: Blood Updated: 07/02/25 0840     WBC 7.74 10*3/mm3      RBC 4.64 10*6/mm3      Hemoglobin 14.2 g/dL      Hematocrit 41.0 %      MCV 88.4 fL      MCH 30.6 pg      MCHC 34.6 g/dL      RDW 13.8 %      RDW-SD 43.7 fl      MPV 8.3 fL      Platelets 197 10*3/mm3     Comprehensive Metabolic Panel [[id number]]  (Abnormal) Collected: 07/02/25 0812    Specimen: Blood Updated: 07/02/25 0919     Glucose 161 mg/dL      BUN 18.0 mg/dL      Creatinine 0.71 mg/dL      Sodium 140 mmol/L      Potassium 4.0 mmol/L      Chloride 106 mmol/L      CO2 22.5 mmol/L      Calcium 9.2 mg/dL      Total Protein 6.7 g/dL      Albumin 3.9 g/dL      ALT (SGPT) 55 U/L      AST (SGOT) 41 U/L      Alkaline Phosphatase 75 U/L      Total Bilirubin 0.5 mg/dL      Globulin 2.8 gm/dL      A/G Ratio 1.4 g/dL      BUN/Creatinine Ratio 25.4     Anion Gap 11.5 mmol/L      eGFR 80.9 mL/min/1.73     Narrative:      GFR Categories in Chronic Kidney Disease (CKD)              GFR Category          GFR (mL/min/1.73)    Interpretation  G1                    90 or greater        Normal or high (1)  G2                    60-89                Mild decrease (1)  G3a                   45-59                Mild to moderate decrease  G3b                   30-44                Moderate to severe decrease  G4                    15-29                Severe decrease  G5                    14 or less           Kidney failure    (1)In the absence of evidence of kidney disease, neither GFR category G1 or G2 fulfill the criteria for CKD.    eGFR calculation 2021 CKD-EPI creatinine equation, which does not include race as a factor    Hemoglobin A1c [[id number]]  (Abnormal) Collected: 07/02/25 0812    Specimen: Blood Updated: 07/02/25 0850     Hemoglobin A1C 7.10 %     Narrative:      Hemoglobin A1C Ranges:    Increased Risk for Diabetes  5.7% to 6.4%  Diabetes                     >= 6.5%  Diabetic Goal                < 7.0%    Lipid Panel [[id number]]  (Abnormal)  Collected: 07/02/25 0812    Specimen: Blood Updated: 07/02/25 0919     Total Cholesterol 199 mg/dL      Triglycerides 143 mg/dL      HDL Cholesterol 65 mg/dL      LDL Cholesterol  109 mg/dL      VLDL Cholesterol 25 mg/dL      LDL/HDL Ratio 1.62    Narrative:      Cholesterol Reference Ranges  (U.S. Department of Health and Human Services ATP III Classifications)    Desirable          <200 mg/dL  Borderline High    200-239 mg/dL  High Risk          >240 mg/dL      Triglyceride Reference Ranges  (U.S. Department of Health and Human Services ATP III Classifications)    Normal           <150 mg/dL  Borderline High  150-199 mg/dL  High             200-499 mg/dL  Very High        >500 mg/dL    HDL Reference Ranges  (U.S. Department of Health and Human Services ATP III Classifications)    Low     <40 mg/dl (major risk factor for CHD)  High    >60 mg/dl ('negative' risk factor for CHD)        LDL Reference Ranges  (U.S. Department of Health and Human Services ATP III Classifications)    Optimal          <100 mg/dL  Near Optimal     100-129 mg/dL  Borderline High  130-159 mg/dL  High             160-189 mg/dL  Very High        >189 mg/dL    LDL is calculated using the NIH LDL-C calculation.      TSH [[id number]]  (Normal) Collected: 07/02/25 0812    Specimen: Blood Updated: 07/02/25 0926     TSH 3.180 uIU/mL             Imaging Results (Last 24 Hours)       Procedure Component Value Units Date/Time    MRI Brain Without Contrast - Preliminary [[id number]] Collected: 07/02/25 0751     Updated: 07/02/25 0751    This result has not been signed. Information might be incomplete.      Narrative:      MRI OF THE BRAIN WITHOUT CONTRAST ON 07/02/2025     CLINICAL HISTORY: This is a 89-year-old female patient with acute  strokelike symptoms with numbness and tingling on left side of her lip.     TECHNIQUE: Axial T1, FLAIR, fat-suppressed T2, axial diffusion and  gradient echo T2 and sagittal T1-weighted images were obtained of  the  entire head.     This is correlated to a contrast-enhanced CT angiogram of the head and  neck and CT perfusion study of the head yesterday evening on 07/01/2025  at 6:54 p.m. and a prior MRI of the brain on 05/13/2019.     FINDINGS: There are patchy nodular and confluent areas of T2 high signal  throughout the periventricular and subcortical white matter of the  cerebral hemispheres consistent with moderate-to-severe small vessel  disease. There is a 15 x 10 mm chronic infarct in the superior medial  left cerebellum in the left superior cerebellar artery territory that  occurred back in 05/2019. There is a 10 x 5 mm old lateral right  cerebellar infarct in the right PICA territory, 2 separate tiny 4-5 mm  old posterior lateral left cerebellar infarcts in the left PICA  territory, unchanged since MRI 05/13/2019. There is a triangular area of  intense increased signal intensity on the diffusion-weighted images that  measures 13 x 11 mm in anterior posterior and medial lateral dimension.  It extends from the mid right corona radiata region into the posterior  right putamen and involves the lateral fibers of the posterior limb of  the right internal capsule and the medial fibers of the right posterior  external capsule and is consistent with an acute lacunar-type infarct.  The remainder of the brain parenchyma is normal in signal intensity.  There is diffuse cerebral atrophy. The lateral and third ventricles are  prominent in size, felt to be due to central volume loss or atrophy. I  see no mass effect and no midline shift and no extra-axial fluid  collections are identified. The calvarium and skull base are normal in  appearance. There are lens implants in the globes from previous cataract  surgery. Otherwise, the orbits are normal in appearance. The paranasal  sinuses and the mastoid air cells and the middle ear cavities are clear.       Impression:      1. There is an acute lacunar-type infarct measuring 13 x 11  mm in  anterior posterior and medial lateral dimensions that extends from the  mid right corona radiata region into the posterior right putamen and it  involves the lateral fibers of the posterior limb of the right internal  capsule and the medial fibers of the posterior right external capsule  and likely accounts for the patient's acute left-sided symptoms.     2. The remainder of the MRI of the brain is unchanged when compared to  MRI of the brain 05/13/2019. There is moderate-to-severe small vessel  disease in the cerebral white matter. There is a 15 x 10 mm chronic  infarct in the superior medial left cerebellum in the left superior  cerebellar artery territory that occurred back in 05/2019. There is a 10  x 5 mm old lateral right cerebellar infarct in the right PICA territory  and 2 separate tiny 4-5 mm old posterior lateral left cerebellar  infarcts in the left PICA territory. There is diffuse cerebral atrophy.      The results of this study were communicated Dr. Hernandez from Stroke  Neurology by telephone on 07/02/2025 at 7:40 a.m.       XR Chest 1 View [[id number]] Collected: 07/01/25 2026     Updated: 07/01/25 2030    Narrative:      XR CHEST 1 VW-     Clinical: Acute stroke protocol     COMPARISON examination 7/31/2020     FINDINGS: There is cardiomegaly. Diffuse increase in the overall  interstitial pattern most consistent with vascular congestion, diffuse  atypical pneumonia possible. Mediastinum is satisfactory in appearance.  There is a vague area of opacity demonstrated at the left lung base,  superimposed consolidation or pleural effusion. Favor the latter. The  remainder is unremarkable.     This report was finalized on 7/1/2025 8:27 PM by Dr. Alexis Crawley M.D  on Workstation: [id number]       CT Angiogram Head w AI Analysis of LVO [[id number]] Collected: 07/01/25 1929     Updated: 07/01/25 1950    Narrative:      HEAD CT WITHOUT CONTRAST, HEAD & NECK CTA WITH CONTRAST, PRE 7 POST  CONTRAST HEAD  PERFUSION CT     INDICATION:  Slurred speech and difficulty swallowing.     TECHNIQUE:  Unenhanced head CT, followed by CT angiogram of the head and neck with  IV contrast. 3-D postprocessing was performed and reviewed.  Evaluation  for a significant carotid arterial stenosis is based on the NASCET  criteria. Axial CT images of the brain without and with IV contrast  using cerebral perfusion protocol. Post-processing parametric maps were  created using RAPID software and reviewed.  Radiation dose reduction  techniques were utilized, including automated exposure control, and  exposure modulation based on body size.     COMPARISON:  Head and neck CT angiogram 5/13/2019     FINDINGS:     Head CT: There is volume loss, but there is no CT evidence of acute  intracranial hemorrhage, mass, or infarct. There is no hydrocephalus or  extra-axial fluid collection.  There are moderately extensive senescent  changes, but there is no evidence of acute intracranial abnormality.     CTA neck: There is a normal aortic arch branching pattern without  proximal great vessel stenosis. Both vertebral arteries are patent  throughout the neck, and supply the basilar artery.  Both common carotid  arteries are normally patent. There is plaque at both cervical carotid  bifurcations, with 0% stenosis in both internal carotid arteries.     CTA head:  There is symmetric distal intracranial runoff in the  anterior, middle, and posterior cerebral artery territories.  There is  no evidence of intracranial aneurysm, or of high-grade intracranial  flow-limiting stenosis.  There is no evidence of branch vessel  occlusion, or region or zone of hypoperfusion.  The dural venous sinuses  appear normal.     Extravascular structures: There is no intracranial mass or abnormal  enhancement.   The extracranial and cervical soft tissue structures show  no acute abnormality.  The visualized lung apices show no acute  abnormality.  There are cervical spinal  degenerative changes, but there  is no acute bony abnormality.     Ischemic tissue volume (Tmax > 6 s): 0 mL  Ischemic core volume (rCBF < 30%): 0 mL  Mismatch (penumbra) volume 0 mL, ratio N/A  Volume of poor collateral perfusion (Tmax > 10 s): 0 mL  Hypoperfusion index (Tmax > 10 s /Tmax > 6 s): N/A  CBV index: N/A       Impression:         Chronic changes as noted above, no acute intracranial abnormality.      Neck CTA shows 0% stenosis in both internal carotid arteries and patent  vertebral arteries bilaterally.     Head CTA shows no evidence of acute intracranial vascular abnormality.     Normal, symmetric cerebral perfusion. No evidence of acute infarct or  ischemia.      This report was finalized on 7/1/2025 7:47 PM by Dr. Brandon Marin M.D  on Workstation: [id number]       CT Angiogram Neck [[id number]] Collected: 07/01/25 1929     Updated: 07/01/25 1950    Narrative:      HEAD CT WITHOUT CONTRAST, HEAD & NECK CTA WITH CONTRAST, PRE 7 POST  CONTRAST HEAD PERFUSION CT     INDICATION:  Slurred speech and difficulty swallowing.     TECHNIQUE:  Unenhanced head CT, followed by CT angiogram of the head and neck with  IV contrast. 3-D postprocessing was performed and reviewed.  Evaluation  for a significant carotid arterial stenosis is based on the NASCET  criteria. Axial CT images of the brain without and with IV contrast  using cerebral perfusion protocol. Post-processing parametric maps were  created using RAPID software and reviewed.  Radiation dose reduction  techniques were utilized, including automated exposure control, and  exposure modulation based on body size.     COMPARISON:  Head and neck CT angiogram 5/13/2019     FINDINGS:     Head CT: There is volume loss, but there is no CT evidence of acute  intracranial hemorrhage, mass, or infarct. There is no hydrocephalus or  extra-axial fluid collection.  There are moderately extensive senescent  changes, but there is no evidence of acute intracranial  abnormality.     CTA neck: There is a normal aortic arch branching pattern without  proximal great vessel stenosis. Both vertebral arteries are patent  throughout the neck, and supply the basilar artery.  Both common carotid  arteries are normally patent. There is plaque at both cervical carotid  bifurcations, with 0% stenosis in both internal carotid arteries.     CTA head:  There is symmetric distal intracranial runoff in the  anterior, middle, and posterior cerebral artery territories.  There is  no evidence of intracranial aneurysm, or of high-grade intracranial  flow-limiting stenosis.  There is no evidence of branch vessel  occlusion, or region or zone of hypoperfusion.  The dural venous sinuses  appear normal.     Extravascular structures: There is no intracranial mass or abnormal  enhancement.   The extracranial and cervical soft tissue structures show  no acute abnormality.  The visualized lung apices show no acute  abnormality.  There are cervical spinal degenerative changes, but there  is no acute bony abnormality.     Ischemic tissue volume (Tmax > 6 s): 0 mL  Ischemic core volume (rCBF < 30%): 0 mL  Mismatch (penumbra) volume 0 mL, ratio N/A  Volume of poor collateral perfusion (Tmax > 10 s): 0 mL  Hypoperfusion index (Tmax > 10 s /Tmax > 6 s): N/A  CBV index: N/A       Impression:         Chronic changes as noted above, no acute intracranial abnormality.      Neck CTA shows 0% stenosis in both internal carotid arteries and patent  vertebral arteries bilaterally.     Head CTA shows no evidence of acute intracranial vascular abnormality.     Normal, symmetric cerebral perfusion. No evidence of acute infarct or  ischemia.      This report was finalized on 7/1/2025 7:47 PM by Dr. Brandon Marin M.D  on Workstation: [id number]       CT CEREBRAL PERFUSION WITH & WITHOUT CONTRAST [[id number]] Collected: 07/01/25 1929     Updated: 07/01/25 1950    Narrative:      HEAD CT WITHOUT CONTRAST, HEAD & NECK CTA  WITH CONTRAST, PRE 7 POST  CONTRAST HEAD PERFUSION CT     INDICATION:  Slurred speech and difficulty swallowing.     TECHNIQUE:  Unenhanced head CT, followed by CT angiogram of the head and neck with  IV contrast. 3-D postprocessing was performed and reviewed.  Evaluation  for a significant carotid arterial stenosis is based on the NASCET  criteria. Axial CT images of the brain without and with IV contrast  using cerebral perfusion protocol. Post-processing parametric maps were  created using RAPID software and reviewed.  Radiation dose reduction  techniques were utilized, including automated exposure control, and  exposure modulation based on body size.     COMPARISON:  Head and neck CT angiogram 5/13/2019     FINDINGS:     Head CT: There is volume loss, but there is no CT evidence of acute  intracranial hemorrhage, mass, or infarct. There is no hydrocephalus or  extra-axial fluid collection.  There are moderately extensive senescent  changes, but there is no evidence of acute intracranial abnormality.     CTA neck: There is a normal aortic arch branching pattern without  proximal great vessel stenosis. Both vertebral arteries are patent  throughout the neck, and supply the basilar artery.  Both common carotid  arteries are normally patent. There is plaque at both cervical carotid  bifurcations, with 0% stenosis in both internal carotid arteries.     CTA head:  There is symmetric distal intracranial runoff in the  anterior, middle, and posterior cerebral artery territories.  There is  no evidence of intracranial aneurysm, or of high-grade intracranial  flow-limiting stenosis.  There is no evidence of branch vessel  occlusion, or region or zone of hypoperfusion.  The dural venous sinuses  appear normal.     Extravascular structures: There is no intracranial mass or abnormal  enhancement.   The extracranial and cervical soft tissue structures show  no acute abnormality.  The visualized lung apices show no  acute  abnormality.  There are cervical spinal degenerative changes, but there  is no acute bony abnormality.     Ischemic tissue volume (Tmax > 6 s): 0 mL  Ischemic core volume (rCBF < 30%): 0 mL  Mismatch (penumbra) volume 0 mL, ratio N/A  Volume of poor collateral perfusion (Tmax > 10 s): 0 mL  Hypoperfusion index (Tmax > 10 s /Tmax > 6 s): N/A  CBV index: N/A       Impression:         Chronic changes as noted above, no acute intracranial abnormality.      Neck CTA shows 0% stenosis in both internal carotid arteries and patent  vertebral arteries bilaterally.     Head CTA shows no evidence of acute intracranial vascular abnormality.     Normal, symmetric cerebral perfusion. No evidence of acute infarct or  ischemia.      This report was finalized on 7/1/2025 7:47 PM by Dr. Brandon Marin M.D  on Workstation: [id number]               Results for orders placed during the hospital encounter of 05/13/19    Adult Transthoracic Echo Complete W/ Cont if Necessary Per Protocol (With Agitated Saline) 05/14/2019  9:12 AM    Interpretation Summary  · Calculated EF = 54%.  · Left ventricular systolic function is normal.  · Mild aortic valve regurgitation is present.  · Mild mitral valve regurgitation is present  · Mild tricuspid valve regurgitation is present.  · Estimated right ventricular systolic pressure from tricuspid regurgitation is normal (<35 mmHg).  · Saline test results are negative.      Telemetry Scan   Final Result      ECG 12 Lead ED Triage Standing Order; Acute Stroke (Onset <24 hrs)   Preliminary Result   HEART RATE=84  bpm   RR Ldtgjfod=042  ms   SC Qgvefttx=009  ms   P Horizontal Axis=-62  deg   P Front Axis=48  deg   QRSD Interval=96  ms   QT Pyzmaknh=373  ms   CJvE=158  ms   QRS Axis=17  deg   T Wave Axis=112  deg   - ABNORMAL ECG -   Sinus rhythm   Probable LVH with secondary repol abnrm   Date and Time of Study:2025-07-01 19:10:45           Assessment/Plan     Active Hospital Problems    Diagnosis   POA    Dysarthria [R47.1]  Yes    Facial droop [R29.810]  Yes    Anxiety and depression [F41.9, F32.A]  Yes    Stroke [I63.9]  Yes    History of stroke [Z86.73]  Not Applicable    Cognitive dysfunction [F09]  Yes    Controlled type 2 diabetes mellitus without complication, without long-term current use of insulin [E11.9]  Yes      Resolved Hospital Problems   No resolved problems to display.   Acute CVA  - presented with left facial droop and dysarthria  - CTA head and neck showed no significant large vessel stenosis  - brain MRI showed lacunar infarct of the right putamen/internal capsule  - echocardiogram pending  - continue ASA, lipitor, IV fluids  - PT/OT/SLP consulted, NPO until SLP evaluation  - Neurology consulted-Dr. Cunningham was called from [facility]    Type 2 DM  - BG acceptable  - hold metformin  - cover with ssi/hypoglycemia protocol    Depression/Anxiety  - appears controlled  - continue lexapro    I discussed the patient's findings and my recommendations with patient, nursing staff, and consulting provider. D/w Dr. Hernandez    VTE Prophylaxis - SCDs.  Code Status - Full code.       Rubén Choe MD  [facility]  07/02/25  09:29 EDT

## 2025-07-02 NOTE — PLAN OF CARE
Goal Outcome Evaluation:  Plan of Care Reviewed With: patient           Outcome Evaluation: Clinical swallow evaluation completed recommend regular solids and thin liquids, meds whole as tolerated and small bites and sips.

## 2025-07-02 NOTE — PLAN OF CARE
Goal Outcome Evaluation:     Significant Assessment Findings: NIH 1 d/t slightly,  slurred speech, shingles right back/arm pit area x 2 weeks. Patient forgetful but easily reoriented and follows commands.Npo until speech eval. VSS. No pain stated. MRI completed. Neuro consult placed.

## 2025-07-02 NOTE — THERAPY EVALUATION
Acute Care - Speech Language Pathology   Swallow Initial Evaluation [facility]     Patient Name: Cristy Wilde  : 1934  MRN: [id number]  Today's Date: 2025               Admit Date: 2025    Visit Dx:     ICD-10-CM ICD-9-CM   1. Slurred speech  R47.81 784.59   2. Facial droop  R29.810 781.94   3. Left facial numbness  R20.0 782.0     Patient Active Problem List   Diagnosis    Chronic rhinitis    Dry eyes    Hearing loss    Osteoporosis    Adjustment disorder with mixed anxiety and depressed mood    Onychomycosis    Controlled type 2 diabetes mellitus without complication, without long-term current use of insulin    Pruritus    Stress incontinence in female    Xerosis of skin    Chronic midline low back pain without sciatica    Osteoporosis    Cognitive dysfunction    History of stroke    Stroke    Dysarthria    Facial droop    Anxiety and depression     Past Medical History:   Diagnosis Date    Chronic constipation     Chronic rhinitis     with noted vasomotor rhinitis    Diabetes mellitus     Diverticulosis     Dry eyes     sees dr norton    Globus sensation     Hearing loss     noted on  audiology testing    History of allergic rhinitis     History of onychomycosis     Internal hemorrhoids     grade 2    Laryngitis, chronic     New infarction of cerebellum 2019    Osteoarthritis, chronic     minimal sx controlled with exercise    Osteoporosis     Pancreatitis due to biliary obstruction 2013    relieved after lap lacho    Patient had no falls in past year     Postpartum depression     Prediabetes     Reaction, adjustment, with anxious, depressed mood     Stroke     Vertigo 2018    Recurrent, suspect BPPV     Past Surgical History:   Procedure Laterality Date    CATARACT EXTRACTION      CATARACT EXTRACTION BILATERAL W/ ANTERIOR VITRECTOMY  2009    CHOLECYSTECTOMY      DILATION AND CURETTAGE, DIAGNOSTIC / THERAPEUTIC  1986    EYE PTOSIS REPAIR Bilateral     REFRACTIVE  SURGERY      2015 L and 2016 R    TEAR DUCT SURGERY      2007 R and 2014 L; Dr. Davis    TONSILLECTOMY  1957       SLP Recommendation and Plan  SLP Swallowing Diagnosis: swallow WFL/no suspected pharyngeal impairment (07/02/25 1200)  SLP Diet Recommendation: regular textures, thin liquids (07/02/25 1200)  Recommended Precautions and Strategies: upright posture during/after eating, small bites of food and sips of liquid (07/02/25 1200)  SLP Rec. for Method of Medication Administration: meds whole, as tolerated (07/02/25 1200)     Monitor for Signs of Aspiration: yes, notify SLP if any concerns (07/02/25 1200)     Swallow Criteria for Skilled Therapeutic Interventions Met: demonstrates skilled criteria (07/02/25 1200)  Anticipated Discharge Disposition (SLP): assisted living facility ([facility]) (07/02/25 1200)  Rehab Potential/Prognosis, Swallowing: good, to achieve stated therapy goals (07/02/25 1200)  Therapy Frequency (Swallow): PRN (07/02/25 1200)  Predicted Duration Therapy Intervention (Days): until discharge (07/02/25 1200)  Oral Care Recommendations: Oral Care BID/PRN (07/02/25 1200)                                        Outcome Evaluation: Clinical swallow evaluation completed recommend regular solids and thin liquids, meds whole as tolerated and small bites and sips.      SWALLOW EVALUATION (Last 72 Hours)       SLP Adult Swallow Evaluation       Row Name 07/02/25 1200                   Rehab Evaluation    Document Type evaluation  -KA        Subjective Information no complaints  -KA        Patient Observations alert;cooperative  -KA        Patient Effort good  -KA        Symptoms Noted During/After Treatment none  -KA           General Information    Patient Profile Reviewed yes  -KA        Pertinent History Of Current Problem right CVA  -KA        Current Method of Nutrition NPO  -KA        Precautions/Limitations, Vision WFL  -KA        Precautions/Limitations, Hearing WFL  -KA        Prior Level of  Function-Swallowing no diet consistency restrictions  -KA        Plans/Goals Discussed with patient and family;agreed upon  -KA        Barriers to Rehab none identified  -KA        Patient's Goals for Discharge return to PO diet  -KA        Family Goals for Discharge family did not state  -KA           Pain    Pretreatment Pain Rating 0/10 - no pain  -KA        Posttreatment Pain Rating 0/10 - no pain  -KA           Oral Motor Structure and Function    Dentition Assessment natural, present and adequate  -KA        Secretion Management WNL/WFL  -KA        Mucosal Quality moist, healthy  -KA        Volitional Swallow WFL  -KA           Oral Musculature and Cranial Nerve Assessment    Oral Motor General Assessment oral labial or buccal impairment  -KA        Oral Labial or Buccal Impairment, Detail, Cranial Nerve VII (Facial): left labial droop;other (see comments)  very slight  -KA           General Eating/Swallowing Observations    Eating/Swallowing Skills self-fed  -KA        Positioning During Eating upright in bed  -KA        Utensils Used spoon;cup;straw  -KA        Consistencies Trialed regular textures;soft to chew textures;chopped;mixed consistency;pureed;thin liquids  -KA           Clinical Swallow Eval    Clinical Swallow Evaluation Summary Prior to swallow evaluation pt demonstrated frequent and ongoing throat clearing. Pt reports she normally clears throat and it feels worse due to possibly dryness and allergies. Pt accepeted trials of thins via cup and straw, puree, mechanical soft mixed and regular solids without immediate overt s/s of pen/asp. Intermittent delayed throat clearing occured. Do not feel related to swallow. Patient demonstrated adequate laryngeal elevation and mastication.  -KA           SLP Evaluation Clinical Impression    SLP Swallowing Diagnosis swallow WFL/no suspected pharyngeal impairment  -KA        Functional Impact risk of aspiration/pneumonia  -KA        Rehab  Potential/Prognosis, Swallowing good, to achieve stated therapy goals  -KA        Swallow Criteria for Skilled Therapeutic Interventions Met demonstrates skilled criteria  -KA           Recommendations    Therapy Frequency (Swallow) PRN  -KA        Predicted Duration Therapy Intervention (Days) until discharge  -        SLP Diet Recommendation regular textures;thin liquids  -KA        Recommended Precautions and Strategies upright posture during/after eating;small bites of food and sips of liquid  -KA        Oral Care Recommendations Oral Care BID/PRN  -KA        SLP Rec. for Method of Medication Administration meds whole;as tolerated  -KA        Monitor for Signs of Aspiration yes;notify SLP if any concerns  -KA        Anticipated Discharge Disposition (SLP) assisted living facility (half-way)  -[facility]           Swallow Goals (SLP)    Swallow STGs diet tolerance goal selection (SLP)  -KA        Diet Tolerance Goal Selection (SLP) Patient will tolerate trials of  -KA           (STG) Patient will tolerate trials of    Consistencies Trialed (Tolerate trials) regular textures;thin liquids  -KA        Desired Outcome (Tolerate trials) without signs/symptoms of aspiration  -KA                  User Key  (r) = Recorded By, (t) = Taken By, (c) = Cosigned By      Initials Name Effective Dates    Eric Stewart SLP 01/05/24 -                     EDUCATION  The patient has been educated in the following areas:   Dysphagia (Swallowing Impairment).        SLP GOALS       Row Name 07/02/25 1200             (STG) Patient will tolerate trials of    Consistencies Trialed (Tolerate trials) regular textures;thin liquids  -KA      Desired Outcome (Tolerate trials) without signs/symptoms of aspiration  -KA                User Key  (r) = Recorded By, (t) = Taken By, (c) = Cosigned By      Initials Name Provider Type    Eric Stewart SLP Speech and Language Pathologist                         Time Calculation:    Time Calculation- SLP        Row Name 07/02/25 1249             Time Calculation- SLP    SLP Start Time 1030  -KA      SLP Received On 07/02/25  -KA         Untimed Charges    22772-GM Eval Oral Pharyng Swallow Minutes 55  -KA         Total Minutes    Untimed Charges Total Minutes 55  -KA       Total Minutes 55  -KA                User Key  (r) = Recorded By, (t) = Taken By, (c) = Cosigned By      Initials Name Provider Type    Eric Stewart SLP Speech and Language Pathologist                    Therapy Charges for Today       Code Description Service Date Service Provider Modifiers Qty    [id number]  ST EVAL ORAL PHARYNG SWALLOW 4 7/2/2025 Eric Lara SLP GN 1                 CHAVO [place]fall  7/2/2025

## 2025-07-02 NOTE — ED NOTES
Pt up to restroom, incontinence issue, pt cleaned and changed in restroom.  Pt feel much better upon return to room.

## 2025-07-03 ENCOUNTER — READMISSION MANAGEMENT (OUTPATIENT)
Dept: CALL CENTER | Facility: HOSPITAL | Age: OVER 89
End: 2025-07-03
Payer: MEDICARE

## 2025-07-03 VITALS
BODY MASS INDEX: 21.51 KG/M2 | TEMPERATURE: 98.1 F | DIASTOLIC BLOOD PRESSURE: 75 MMHG | SYSTOLIC BLOOD PRESSURE: 155 MMHG | HEART RATE: 87 BPM | HEIGHT: 64 IN | RESPIRATION RATE: 18 BRPM | OXYGEN SATURATION: 98 % | WEIGHT: 126 LBS

## 2025-07-03 LAB
ANION GAP SERPL CALCULATED.3IONS-SCNC: 8.6 MMOL/L (ref 5–15)
BASOPHILS # BLD AUTO: 0.07 10*3/MM3 (ref 0–0.2)
BASOPHILS NFR BLD AUTO: 1.1 % (ref 0–1.5)
BUN SERPL-MCNC: 14 MG/DL (ref 8–23)
BUN/CREAT SERPL: 21.2 (ref 7–25)
CALCIUM SPEC-SCNC: 9 MG/DL (ref 8.2–9.6)
CHLORIDE SERPL-SCNC: 105 MMOL/L (ref 98–107)
CO2 SERPL-SCNC: 25.4 MMOL/L (ref 22–29)
CREAT SERPL-MCNC: 0.66 MG/DL (ref 0.57–1)
DEPRECATED RDW RBC AUTO: 43.7 FL (ref 37–54)
EGFRCR SERPLBLD CKD-EPI 2021: 83.5 ML/MIN/1.73
EOSINOPHIL # BLD AUTO: 0.11 10*3/MM3 (ref 0–0.4)
EOSINOPHIL NFR BLD AUTO: 1.7 % (ref 0.3–6.2)
ERYTHROCYTE [DISTWIDTH] IN BLOOD BY AUTOMATED COUNT: 13.6 % (ref 12.3–15.4)
GLUCOSE BLDC GLUCOMTR-MCNC: 199 MG/DL (ref 70–130)
GLUCOSE BLDC GLUCOMTR-MCNC: 238 MG/DL (ref 70–130)
GLUCOSE SERPL-MCNC: 165 MG/DL (ref 65–99)
HCT VFR BLD AUTO: 38.7 % (ref 34–46.6)
HGB BLD-MCNC: 13.4 G/DL (ref 12–15.9)
IMM GRANULOCYTES # BLD AUTO: 0.05 10*3/MM3 (ref 0–0.05)
IMM GRANULOCYTES NFR BLD AUTO: 0.8 % (ref 0–0.5)
LYMPHOCYTES # BLD AUTO: 1.06 10*3/MM3 (ref 0.7–3.1)
LYMPHOCYTES NFR BLD AUTO: 15.9 % (ref 19.6–45.3)
MCH RBC QN AUTO: 30.6 PG (ref 26.6–33)
MCHC RBC AUTO-ENTMCNC: 34.6 G/DL (ref 31.5–35.7)
MCV RBC AUTO: 88.4 FL (ref 79–97)
MONOCYTES # BLD AUTO: 0.77 10*3/MM3 (ref 0.1–0.9)
MONOCYTES NFR BLD AUTO: 11.6 % (ref 5–12)
NEUTROPHILS NFR BLD AUTO: 4.59 10*3/MM3 (ref 1.7–7)
NEUTROPHILS NFR BLD AUTO: 68.9 % (ref 42.7–76)
NRBC BLD AUTO-RTO: 0 /100 WBC (ref 0–0.2)
PLATELET # BLD AUTO: 189 10*3/MM3 (ref 140–450)
PMV BLD AUTO: 8.5 FL (ref 6–12)
POTASSIUM SERPL-SCNC: 4.1 MMOL/L (ref 3.5–5.2)
RBC # BLD AUTO: 4.38 10*6/MM3 (ref 3.77–5.28)
SODIUM SERPL-SCNC: 139 MMOL/L (ref 136–145)
WBC NRBC COR # BLD AUTO: 6.65 10*3/MM3 (ref 3.4–10.8)

## 2025-07-03 PROCEDURE — 82948 REAGENT STRIP/BLOOD GLUCOSE: CPT

## 2025-07-03 PROCEDURE — 97530 THERAPEUTIC ACTIVITIES: CPT

## 2025-07-03 PROCEDURE — 80048 BASIC METABOLIC PNL TOTAL CA: CPT | Performed by: INTERNAL MEDICINE

## 2025-07-03 PROCEDURE — 85025 COMPLETE CBC W/AUTO DIFF WBC: CPT | Performed by: INTERNAL MEDICINE

## 2025-07-03 PROCEDURE — 97165 OT EVAL LOW COMPLEX 30 MIN: CPT

## 2025-07-03 PROCEDURE — 96361 HYDRATE IV INFUSION ADD-ON: CPT

## 2025-07-03 PROCEDURE — 63710000001 INSULIN LISPRO (HUMAN) PER 5 UNITS: Performed by: INTERNAL MEDICINE

## 2025-07-03 PROCEDURE — G0378 HOSPITAL OBSERVATION PER HR: HCPCS

## 2025-07-03 PROCEDURE — 97535 SELF CARE MNGMENT TRAINING: CPT

## 2025-07-03 PROCEDURE — 97161 PT EVAL LOW COMPLEX 20 MIN: CPT

## 2025-07-03 RX ORDER — ATORVASTATIN CALCIUM 20 MG/1
40 TABLET, FILM COATED ORAL NIGHTLY
Status: DISCONTINUED | OUTPATIENT
Start: 2025-07-03 | End: 2025-07-03 | Stop reason: HOSPADM

## 2025-07-03 RX ORDER — ATORVASTATIN CALCIUM 40 MG/1
40 TABLET, FILM COATED ORAL NIGHTLY
Qty: 90 TABLET | Refills: 0 | Status: SHIPPED | OUTPATIENT
Start: 2025-07-03

## 2025-07-03 RX ORDER — CLOPIDOGREL BISULFATE 75 MG/1
75 TABLET ORAL DAILY
Status: DISCONTINUED | OUTPATIENT
Start: 2025-07-03 | End: 2025-07-03 | Stop reason: HOSPADM

## 2025-07-03 RX ORDER — CLOPIDOGREL BISULFATE 75 MG/1
75 TABLET ORAL DAILY
Qty: 21 TABLET | Refills: 0 | Status: SHIPPED | OUTPATIENT
Start: 2025-07-03 | End: 2025-07-24

## 2025-07-03 RX ORDER — ASPIRIN 81 MG/1
81 TABLET, CHEWABLE ORAL DAILY
Status: DISCONTINUED | OUTPATIENT
Start: 2025-07-03 | End: 2025-07-03 | Stop reason: HOSPADM

## 2025-07-03 RX ADMIN — CLOPIDOGREL BISULFATE 75 MG: 75 TABLET, FILM COATED ORAL at 09:00

## 2025-07-03 RX ADMIN — INSULIN LISPRO 2 UNITS: 100 INJECTION, SOLUTION INTRAVENOUS; SUBCUTANEOUS at 09:00

## 2025-07-03 RX ADMIN — ESCITALOPRAM 10 MG: 10 TABLET, FILM COATED ORAL at 09:00

## 2025-07-03 RX ADMIN — ASPIRIN 81 MG CHEWABLE TABLET 81 MG: 81 TABLET CHEWABLE at 09:00

## 2025-07-03 NOTE — THERAPY EVALUATION
Patient Name: Cristy Wilde  : 1934    MRN: [id number]                              Today's Date: 7/3/2025       Admit Date: 2025    Visit Dx:     ICD-10-CM ICD-9-CM   1. Slurred speech  R47.81 784.59   2. Facial droop  R29.810 781.94   3. Left facial numbness  R20.0 782.0     Patient Active Problem List   Diagnosis    Chronic rhinitis    Dry eyes    Hearing loss    Osteoporosis    Adjustment disorder with mixed anxiety and depressed mood    Onychomycosis    Controlled type 2 diabetes mellitus without complication, without long-term current use of insulin    Pruritus    Stress incontinence in female    Xerosis of skin    Chronic midline low back pain without sciatica    Osteoporosis    Cognitive dysfunction    History of stroke    Stroke    Dysarthria    Facial droop    Anxiety and depression     Past Medical History:   Diagnosis Date    Chronic constipation     Chronic rhinitis     with noted vasomotor rhinitis    Diabetes mellitus     Diverticulosis     Dry eyes     sees dr norton    Globus sensation     Hearing loss     noted on  audiology testing    History of allergic rhinitis     History of onychomycosis     Internal hemorrhoids     grade 2    Laryngitis, chronic     New infarction of cerebellum 2019    Osteoarthritis, chronic     minimal sx controlled with exercise    Osteoporosis     Pancreatitis due to biliary obstruction 2013    relieved after lap lacho    Patient had no falls in past year     Postpartum depression     Prediabetes     Reaction, adjustment, with anxious, depressed mood     Stroke     Vertigo 2018    Recurrent, suspect BPPV     Past Surgical History:   Procedure Laterality Date    CATARACT EXTRACTION      CATARACT EXTRACTION BILATERAL W/ ANTERIOR VITRECTOMY  2009    CHOLECYSTECTOMY      DILATION AND CURETTAGE, DIAGNOSTIC / THERAPEUTIC  1986    EYE PTOSIS REPAIR Bilateral 2015    REFRACTIVE SURGERY       L and 2016 R    TEAR DUCT SURGERY       R  and 2014 L; Dr. Davis    TONSILLECTOMY  1957      General Information       Row Name 07/03/25 1020          Physical Therapy Time and Intention    Document Type evaluation  -     Mode of Treatment individual therapy;physical therapy  -       Row Name 07/03/25 1020          General Information    Prior Level of Function independent:;gait;transfer;bed mobility  walks with walker or cane  -     Existing Precautions/Restrictions fall  -     Barriers to Rehab medically complex  -       Row Name 07/03/25 1020          Living Environment    Current Living Arrangements independent living facility  -     People in Home alone  -       Row Name 07/03/25 1020          Home Main Entrance    Number of Stairs, Main Entrance none  -     Stair Railings, Main Entrance none  -       Row Name 07/03/25 1020          Cognition    Orientation Status (Cognition) oriented to;person  pt frequently forgetting she is in the hospital  -[facility] Name 07/03/25 1020          Safety Issues/Impairments Affecting Functional Mobility    Impairments Affecting Function (Mobility) strength  -               User Key  (r) = Recorded By, (t) = Taken By, (c) = Cosigned By      Initials Name Provider Type     Marii Jara, PT Physical Therapist                   Mobility       Row Name 07/03/25 1021          Bed Mobility    Bed Mobility supine-sit;sit-supine  -     Supine-Sit [place] (Bed Mobility) not tested  -     Sit-Supine [place] (Bed Mobility) not tested  -     Comment, (Bed Mobility) sitting in chair  -[facility] Name 07/03/25 1021          Sit-Stand Transfer    Sit-Stand [place] (Transfers) verbal cues;nonverbal cues (demo/gesture);contact guard  -     Assistive Device (Sit-Stand Transfers) walker, front-wheeled  -       Row Name 07/03/25 1021          Gait/Stairs (Locomotion)    [place] Level (Gait) verbal cues;nonverbal cues (demo/gesture);standby assist  -     Assistive Device  (Gait) walker, front-wheeled  -     Distance in Feet (Gait) 100  -     Deviations/Abnormal Patterns (Gait) robert decreased;gait speed decreased;stride length decreased  -     Comment, (Gait/Stairs) no LOB noted with walker  -               User Key  (r) = Recorded By, (t) = Taken By, (c) = Cosigned By      Initials Name Provider Type     Marii Jara, PT Physical Therapist                   Obj/Interventions       [facility] Name 07/03/25 1022          Range of Motion Comprehensive    General Range of Motion no range of motion deficits identified  -CH       Row Name 07/03/25 1022          Strength Comprehensive (MMT)    Comment, General Manual Muscle Testing (MMT) Assessment L UE 4/5, R UE 4+/5, B LE grossly 4+/5  -[facility] Name 07/03/25 1022          Balance    Balance Assessment standing static balance;standing dynamic balance  -     Static Standing Balance verbal cues;non-verbal cues (demo/gesture);contact guard  -     Dynamic Standing Balance verbal cues;non-verbal cues (demo/gesture);contact guard  -     Position/Device Used, Standing Balance walker, rolling  -               User Key  (r) = Recorded By, (t) = Taken By, (c) = Cosigned By      Initials Name Provider Type     Marii Jara, PT Physical Therapist                   Goals/Plan       Row Name 07/03/25 1026          Bed Mobility Goal 1 (PT)    Activity/Assistive Device (Bed Mobility Goal 1, PT) bed mobility activities, all  -     [place] Level/Cues Needed (Bed Mobility Goal 1, PT) supervision required  -     Time Frame (Bed Mobility Goal 1, PT) 1 week  -CH       Row Name 07/03/25 1026          Transfer Goal 1 (PT)    Activity/Assistive Device (Transfer Goal 1, PT) transfers, all;walker, rolling  -     [place] Level/Cues Needed (Transfer Goal 1, PT) supervision required  -     Time Frame (Transfer Goal 1, PT) 1 week  -CH       Row Name 07/03/25 1026          Gait Training Goal 1 (PT)    Activity/Assistive  Device (Gait Training Goal 1, PT) gait (walking locomotion);walker, rolling  -     [place] Level (Gait Training Goal 1, PT) supervision required  -     Distance (Gait Training Goal 1, PT) 150  -CH     Time Frame (Gait Training Goal 1, PT) 1 week  -       Row Name 07/03/25 1026          Therapy Assessment/Plan (PT)    Planned Therapy Interventions (PT) balance training;bed mobility training;gait training;home exercise program;patient/family education;strengthening;transfer training  -               User Key  (r) = Recorded By, (t) = Taken By, (c) = Cosigned By      Initials Name Provider Type     Marii Jara, PT Physical Therapist                   Clinical Impression       Row Name 07/03/25 1023          Pain    Pretreatment Pain Rating 0/10 - no pain  -     Posttreatment Pain Rating 0/10 - no pain  -       Row Name 07/03/25 1023          Plan of Care Review    Plan of Care Reviewed With patient;child  -     Outcome Evaluation Pt is a 89 yo F who was admitted from [facility] with dysarthria and L facial droop. Pt presents to PT with some forgetfulness, mild L UE weakness, and complaints of generalized weakness. Pt reports she has been having difficulty with sit to stand and getting out of bed at times. Pt may benefit from skilled PT to address strength, mobility, and gait. PT is recommending HHPT to address pt's complaints of weakness and functional decline.  -       Row Name 07/03/25 1023          Therapy Assessment/Plan (PT)    Patient/Family Therapy Goals Statement (PT) to return to [facility]  -     Rehab Potential (PT) good  -     Criteria for Skilled Interventions Met (PT) skilled treatment is necessary  -     Therapy Frequency (PT) 5 times/wk  -       Row Name 07/03/25 1023          Positioning and Restraints    Pre-Treatment Position sitting in chair/recliner  -     Post Treatment Position chair  -     In Chair reclined;call light within reach;encouraged to call for assist;with  family/caregiver;exit alarm on  -               User Key  (r) = Recorded By, (t) = Taken By, (c) = Cosigned By      Initials Name Provider Type    Marii Archibald PT Physical Therapist                   Outcome Measures       Row Name 07/03/25 1027          How much help from another person do you currently need...    Turning from your back to your side while in flat bed without using bedrails? 3  -CH     Moving from lying on back to sitting on the side of a flat bed without bedrails? 3  -CH     Moving to and from a bed to a chair (including a wheelchair)? 3  -CH     Standing up from a chair using your arms (e.g., wheelchair, bedside chair)? 3  -CH     Climbing 3-5 steps with a railing? 2  -CH     To walk in hospital room? 3  -CH     AM-PAC 6 Clicks Score (PT) 17  -CH     Highest Level of Mobility Goal Stand (1 or More Minutes)-5  -       Row Name 07/03/25 1027          Functional Assessment    Outcome Measure Options AM-PAC 6 Clicks Basic Mobility (PT)  -               User Key  (r) = Recorded By, (t) = Taken By, (c) = Cosigned By      Initials Name Provider Type    Marii Archibald PT Physical Therapist                                 Physical Therapy Education       Title: PT OT SLP Therapies (In Progress)       Topic: Physical Therapy (In Progress)       Point: Mobility training (Done)       Learning Progress Summary            Patient Acceptance, E,TB,D, VU,NR by  at 7/3/2025 1027   Family Acceptance, E,TB,D, VU,NR by  at 7/3/2025 1027                      Point: Home exercise program (Not Started)       Learner Progress:  Not documented in this visit.              Point: Body mechanics (Done)       Learning Progress Summary            Patient Acceptance, E,TB,D, VU,NR by  at 7/3/2025 1027   Family Acceptance, E,TB,D, VU,NR by  at 7/3/2025 1027                      Point: Precautions (Done)       Learning Progress Summary            Patient Acceptance, E,TB,D, VU,NR by  at 7/3/2025  1027   Family Acceptance, E,TB,D, VU,NR by  at 7/3/2025 1027                                      User Key       Initials Effective Dates Name Provider Type Discipline     06/16/21 -  Marii Jara PT Physical Therapist PT                  PT Recommendation and Plan  Planned Therapy Interventions (PT): balance training, bed mobility training, gait training, home exercise program, patient/family education, strengthening, transfer training  Outcome Evaluation: Pt is a 89 yo F who [facility] [place] with dysarthria and L facial droop. Pt presents to PT with some forgetfulness, mild L UE weakness, and complaints of generalized weakness. Pt reports she has been having difficulty with sit to stand and getting out of bed at times. Pt may benefit from skilled PT to address strength, mobility, and gait. PT is recommending HHPT to address pt's complaints of weakness and functional decline.     Time Calculation:         PT Charges       Row Name 07/03/25 1028             Time Calculation    Start Time 0855  -      Stop Time 0912  -      Time Calculation (min) 17 min  -      PT Received On 07/03/25  -      PT - Next Appointment 07/07/25  -      PT Goal Re-Cert Due Date 07/10/25  -         Time Calculation- PT    Total Timed Code Minutes- PT 12 minute(s)  -         Timed Charges    82599 - PT Therapeutic Activity Minutes 12  -         Total Minutes    Timed Charges Total Minutes 12  -       Total Minutes 12  -CH                User Key  (r) = Recorded By, (t) = Taken By, (c) = Cosigned By      Initials Name Provider Type     Marii Jara PT Physical Therapist                  Therapy Charges for Today       Code Description Service Date Service Provider Modifiers Qty    10915659957  PT THERAPEUTIC ACT EA 15 MIN 7/3/2025 Marii Jara, PT GP 1    [id number]  PT EVAL LOW COMPLEXITY 3 7/3/2025 Marii Jara, PT GP 1            PT G-Codes  Outcome Measure Options: AM-PAC 6  Clicks Basic Mobility (PT)  AM-PAC 6 Clicks Score (PT): 17  PT Discharge Summary  Anticipated Discharge Disposition (PT): home with home health, home    Marii Jara, PT  7/3/2025

## 2025-07-03 NOTE — DISCHARGE PLACEMENT REQUEST
"Cristy Wilde (89 y.o. Female)       Date of Birth   1934    Social Security Number       Address   [address] [place] [address]    Home Phone   [phone]    MRN   [id number]       Sabianist   Episcopalian    Marital Status                               Admission Date   7/1/2025    Admission Type   Urgent    Admitting Provider   Rubén Choe MD    Attending Provider   Rubén Choe MD    Department, Room/Bed   [address], P585/1       Discharge Date       Discharge Disposition   Home or Self Care    Discharge Destination                                 Attending Provider: Rubén Choe MD    Allergies: Cefaclor, Cephalexin, Levofloxacin, Penicillins    Isolation: None   Infection: Herpes Zoster (07/02/25)   Code Status: CPR    Ht: 162.6 cm (64\")   Wt: 57.2 kg (126 lb)    Admission Cmt: None   Principal Problem: None                  Active Insurance as of 7/1/2025       Primary Coverage       Payor Plan Insurance Group Employer/Plan Group    HU[employer] MEDICARE REPLACEMENT HU[employer] MEDICARE ADVANTAGE PPO [id number]       Payor Plan Address Payor Plan Phone Number Payor Plan Fax Number Effective Dates    [address] [address] [phone]  1/1/2025 - None Entered    [facility] [id number]         Subscriber Name Subscriber Birth Date Member ID       CRISTY WILDE 1934 [id number]                     Emergency Contacts        (Rel.) Home Phone Work Phone Mobile Phone    Leonides Wilde (Son) [phone] -- --                "

## 2025-07-03 NOTE — DISCHARGE SUMMARY
"Date of Admission: 7/1/2025  Date of Discharge:  7/3/2025  Primary Care Physician: Shantal John MD     Discharge Diagnosis:  Active Hospital Problems    Diagnosis  POA    Dysarthria [R47.1]  Yes    Facial droop [R29.810]  Yes    Anxiety and depression [F41.9, F32.A]  Yes    Stroke [I63.9]  Yes    History of stroke [Z86.73]  Not Applicable    Cognitive dysfunction [F09]  Yes    Controlled type 2 diabetes mellitus without complication, without long-term current use of insulin [E11.9]  Yes      Resolved Hospital Problems   No resolved problems to display.       Presenting Problem/History of Present Illness:  Slurred speech [R47.81]  Facial droop [R29.810]  Stroke [I63.9]  Left facial numbness [R20.0]     Hospital Course:  The patient is a 89 y.o. female with a history of type 2 DM and cognitive dysfunction who presented with slurred speech and left facial droop and was found to have an acute lacunar infarct of the right putamen/internal capsule. CTA head and neck and echocardiogram showed no significant large artery stenosis and no cardio-embolic sources. She is medically stable and will be discharged home on 21 days of aspirin and plavix followed by indefinite aspirin monotherapy and an increased lipitor dose.    Exam Today:  Blood pressure 155/75, pulse 87, temperature 98.1 °F (36.7 °C), temperature source Oral, resp. rate 18, height 162.6 cm (64\"), weight 57.2 kg (126 lb), SpO2 98%, not currently breastfeeding.  Vitals and nursing note reviewed.   Constitutional:       General: She is not in acute distress.     Appearance: She is not toxic-appearing or diaphoretic.   HENT:      Head: Normocephalic and atraumatic.      Mouth/Throat:      Mouth: Mucous membranes are moist.      Pharynx: Oropharynx is clear.   Eyes:      Conjunctiva/sclera: Conjunctivae normal.      Pupils: Pupils are equal, round, and reactive to light.   Cardiovascular:      Rate and Rhythm: Normal rate and regular rhythm.      Pulses: Normal " pulses.   Pulmonary:      Effort: Pulmonary effort is normal.      Breath sounds: Normal breath sounds.   Abdominal:      General: Bowel sounds are normal. There is no distension.      Palpations: Abdomen is soft.      Tenderness: There is no abdominal tenderness.   Musculoskeletal:         General: No swelling or tenderness.      Cervical back: Neck supple.   Skin:     General: Skin is warm and dry.      Capillary Refill: Capillary refill takes less than 2 seconds.   Neurological:      Mental Status: She is alert.      Cranial Nerves: mild dysarthria present.      Motor: Motor function is intact.      Comments: Subtle left facial droop   Psychiatric:         Mood and Affect: Mood and affect normal.     Procedures Performed:  MRI OF THE BRAIN WITHOUT CONTRAST ON 07/02/2025     CLINICAL HISTORY: This is a 89-year-old female patient with acute  strokelike symptoms with numbness and tingling on left side of her lip.     TECHNIQUE: Axial T1, FLAIR, fat-suppressed T2, axial diffusion and  gradient echo T2 and sagittal T1-weighted images were obtained of the  entire head.     This is correlated to a contrast-enhanced CT angiogram of the head and  neck and CT perfusion study of the head yesterday evening on 07/01/2025  at 6:54 p.m. and a prior MRI of the brain on 05/13/2019.     FINDINGS: There are patchy nodular and confluent areas of T2 high signal  throughout the periventricular and subcortical white matter of the  cerebral hemispheres consistent with moderate-to-severe small vessel  disease. There is a 15 x 10 mm chronic infarct in the superior medial  left cerebellum in the left superior cerebellar artery territory that  occurred back in 05/2019. There is a 10 x 5 mm old lateral right  cerebellar infarct in the right PICA territory, 2 separate tiny 4-5 mm  old posterior lateral left cerebellar infarcts in the left PICA  territory, unchanged since MRI 05/13/2019. There is a triangular area of  intense increased signal  intensity on the diffusion-weighted images that  measures 13 x 11 mm in anterior posterior and medial lateral dimension.  It extends from the mid right corona radiata region into the posterior  right putamen and involves the lateral fibers of the posterior limb of  the right internal capsule and the medial fibers of the right posterior  external capsule and is consistent with an acute lacunar-type infarct.  The remainder of the brain parenchyma is normal in signal intensity.  There is diffuse cerebral atrophy. The lateral and third ventricles are  prominent in size, felt to be due to central volume loss or atrophy. I  see no mass effect and no midline shift and no extra-axial fluid  collections are identified. The calvarium and skull base are normal in  appearance. There are lens implants in the globes from previous cataract  surgery. Otherwise, the orbits are normal in appearance. The paranasal  sinuses and the mastoid air cells and the middle ear cavities are clear.     IMPRESSION:  1. There is an acute lacunar-type infarct measuring 13 x 11 mm in  anterior posterior and medial lateral dimensions that extends from the  mid right corona radiata region into the posterior right putamen and it  involves the lateral fibers of the posterior limb of the right internal  capsule and the medial fibers of the posterior right external capsule  and likely accounts for the patient's acute left-sided symptoms.     2. The remainder of the MRI of the brain is unchanged when compared to  MRI of the brain 05/13/2019. There is moderate-to-severe small vessel  disease in the cerebral white matter. There is a 15 x 10 mm chronic  infarct in the superior medial left cerebellum in the left superior  cerebellar artery territory that occurred back in 05/2019. There is a 10  x 5 mm old lateral right cerebellar infarct in the right PICA territory  and 2 separate tiny 4-5 mm old posterior lateral left cerebellar  infarcts in the left PICA  territory. There is diffuse cerebral atrophy.     HEAD CT WITHOUT CONTRAST, HEAD & NECK CTA WITH CONTRAST, PRE 7 POST  CONTRAST HEAD PERFUSION CT     INDICATION:  Slurred speech and difficulty swallowing.     TECHNIQUE:  Unenhanced head CT, followed by CT angiogram of the head and neck with  IV contrast. 3-D postprocessing was performed and reviewed.  Evaluation  for a significant carotid arterial stenosis is based on the NASCET  criteria. Axial CT images of the brain without and with IV contrast  using cerebral perfusion protocol. Post-processing parametric maps were  created using RAPID software and reviewed.  Radiation dose reduction  techniques were utilized, including automated exposure control, and  exposure modulation based on body size.     COMPARISON:  Head and neck CT angiogram 5/13/2019     FINDINGS:     Head CT: There is volume loss, but there is no CT evidence of acute  intracranial hemorrhage, mass, or infarct. There is no hydrocephalus or  extra-axial fluid collection.  There are moderately extensive senescent  changes, but there is no evidence of acute intracranial abnormality.     CTA neck: There is a normal aortic arch branching pattern without  proximal great vessel stenosis. Both vertebral arteries are patent  throughout the neck, and supply the basilar artery.  Both common carotid  arteries are normally patent. There is plaque at both cervical carotid  bifurcations, with 0% stenosis in both internal carotid arteries.     CTA head:  There is symmetric distal intracranial runoff in the  anterior, middle, and posterior cerebral artery territories.  There is  no evidence of intracranial aneurysm, or of high-grade intracranial  flow-limiting stenosis.  There is no evidence of branch vessel  occlusion, or region or zone of hypoperfusion.  The dural venous sinuses  appear normal.     Extravascular structures: There is no intracranial mass or abnormal  enhancement.   The extracranial and cervical soft  tissue structures show  no acute abnormality.  The visualized lung apices show no acute  abnormality.  There are cervical spinal degenerative changes, but there  is no acute bony abnormality.     Ischemic tissue volume (Tmax > 6 s): 0 mL  Ischemic core volume (rCBF < 30%): 0 mL  Mismatch (penumbra) volume 0 mL, ratio N/A  Volume of poor collateral perfusion (Tmax > 10 s): 0 mL  Hypoperfusion index (Tmax > 10 s /Tmax > 6 s): N/A  CBV index: N/A     IMPRESSION:     Chronic changes as noted above, no acute intracranial abnormality.      Neck CTA shows 0% stenosis in both internal carotid arteries and patent  vertebral arteries bilaterally.     Head CTA shows no evidence of acute intracranial vascular abnormality.     Normal, symmetric cerebral perfusion. No evidence of acute infarct or  ischemia.     Consults:   Consults       No orders found from 6/2/2025 to 7/2/2025.             Discharge Disposition:  Home or Self Care    Discharge Medications:     Discharge Medications        New Medications        Instructions Start Date   clopidogrel 75 MG tablet  Commonly known as: PLAVIX   75 mg, Oral, Daily             Changes to Medications        Instructions Start Date   atorvastatin 40 MG tablet  Commonly known as: LIPITOR  What changed:   medication strength  how much to take  when to take this   40 mg, Oral, Nightly             Continue These Medications        Instructions Start Date   aspirin 81 MG EC tablet   81 mg, Oral, Daily      Calcium Citrate-Vitamin D3 315-6.25 MG-MCG tablet tablet  Commonly known as: CITRACAL   2 tablets, 2 Times Daily      cycloSPORINE 0.05 % ophthalmic emulsion  Commonly known as: RESTASIS   1 drop, Both Eyes, 2 Times Daily      denosumab 60 MG/ML solution syringe  Commonly known as: PROLIA   60 mg, Subcutaneous, Every 6 Months      docusate sodium 100 MG capsule  Commonly known as: COLACE   300 mg, Nightly      escitalopram 10 MG tablet  Commonly known as: LEXAPRO   TAKE ONE TABLET BY MOUTH  DAILY      Magnesium 250 MG tablet   1 tablet, Nightly      metFORMIN  MG 24 hr tablet  Commonly known as: GLUCOPHAGE-XR   500 mg, Oral, Daily With Breakfast      ONE TOUCH DELICA LANCING DEV misc   1 each, Not Applicable, Daily, DX: E11.9 TYPE 2 DM      OneTouch Delica Plus Lancing misc   1 each, Not Applicable, Daily, USE ONETOUCH DELICA PLUS LANCING DEVICE ONCE DAILY FOR GLUCOSE MONITOR DX: E11.9      OneTouch Ultra test strip  Generic drug: glucose blood   USE ONE STRIP TO TEST DAILY      vitamin B-12 1000 MCG tablet  Commonly known as: CYANOCOBALAMIN   1,000 mcg, Oral, Daily      Vitamin D3 75 MCG (3000 UT) tablet   3,000 Units, Daily               Discharge Diet:   Diet Instructions       Diet: Regular/House Diet; Regular (IDDSI 7); Thin (IDDSI 0)      Discharge Diet: Regular/House Diet    Texture: Regular (IDDSI 7)    Fluid Consistency: Thin (IDDSI 0)            Activity at Discharge:   Activity Instructions       Activity as Tolerated              Follow-up Appointments:  No future appointments.  Additional Instructions for the Follow-ups that You Need to Schedule       Discharge Follow-up with PCP   As directed       Currently Documented PCP:    Shantal John MD    PCP Phone Number:    [phone]     Follow Up Details: 1 week                 Rubén Choe MD  07/03/25  08:54 EDT    Time Spent on Discharge Activities: Greater than 30 minutes.

## 2025-07-03 NOTE — CASE MANAGEMENT/SOCIAL WORK
Discharge Planning Assessment  [facility]     Patient Name: Cristy Wilde  MRN: [id number]  Today's Date: 7/3/2025    Admit Date: 7/1/2025    Plan: Plans discharge home with [facility]   Discharge Needs Assessment       Row Name 07/03/25 1217       Living Environment    People in Home alone    Current Living Arrangements independent living facility    Potentially Unsafe Housing Conditions none    Primary Care Provided by self    Family Caregiver if Needed child(paulina), adult    Family Caregiver Names Leonides Wilde/son [phone]    Quality of Family Relationships helpful;involved;supportive    Able to Return to Prior Arrangements yes       Resource/Environmental Concerns    Resource/Environmental Concerns none    Transportation Concerns none       Transportation Needs    In the past 12 months, has lack of transportation kept you from medical appointments or from getting medications? no    In the past 12 months, has lack of transportation kept you from meetings, work, or from getting things needed for daily living? No       Transition Planning    Patient/Family Anticipates Transition to home with help/services    Patient/Family Anticipated Services at Transition home health care    Transportation Anticipated family or friend will provide       Discharge Needs Assessment    Readmission Within the Last 30 Days no previous admission in last 30 days    Equipment Currently Used at Home cane, straight;walker, rolling;glucometer    Concerns to be Addressed discharge planning    Anticipated Changes Related to Illness inability to care for self    Equipment Needed After Discharge none    Outpatient/Agency/Support Group Needs homecare agency    Discharge Facility/Level of Care Needs home with home health    Provided Post Acute Provider List? Yes    Post Acute Provider List Home Health    Provided Post Acute Provider Quality & Resource List? Yes    Post Acute Provider Quality and Resource List Home Health     Delivered To Patient;Support Person    Support Person Leonides Wilde/son [phone]    Method of Delivery In person                   Discharge Plan       Row Name 07/03/25 122       Plan    Plan Plans discharge home with [facility].    Patient/Family in Agreement with Plan yes    Plan Comments Spoke with patient and her son Leonides at bedside to complete initial assessment. Confirmed information on facesheet. PCP: Shatnal John MD and Pharmacy: [facility] ([place]). Patient lives alone in an independent living apartment with elevator. DEnies difficulty affording medications or transportation concerns. Patient was IADLs and used cane or walker for mobility. Denies using HH/SNF in the past. Discussed discharge plans/needs. Plans discharge home with [facility]. Provided list of private pay caregivers to hire if needed. Family to transport per private auto.....[facility]                  Continued Care and Services - Admitted Since 7/1/2025       Home Medical Care Coordination complete.      Service Provider Request Status Services Address Phone Fax Patient Preferred    [facility] [address] [place] [id number] [phone] --                  Expected Discharge Date and Time       Expected Discharge Date Expected Discharge Time    Jul 3, 2025            Demographic Summary    No documentation.                  Functional Status    No documentation.                  Psychosocial    No documentation.                  Abuse/Neglect    No documentation.                  Legal    No documentation.                  Substance Abuse    No documentation.                  Patient Forms    No documentation.                     Gin Yan, RN

## 2025-07-03 NOTE — PLAN OF CARE
The pt was admitted to [facility] 2/2 to dysarthria and left-sided facial droop.Dx includes acute right corona radiata stroke consistent with small vessel etiology. Pmhx significant for Alzheimer's, type 2 diabetes, hyperlipidemia, osteoporosis, and left cerebellar stroke in May 2019 without deficits. The pt resides in a ILF where she uses both a cane and walker for mobility. She is independent with ADL's and mobility however staff is available at her facility as needed. The pt was oriented to herself and general location only. She was sitting in her bedside chair. MMT strength testing equal, small facial droop noticed. She stood with CGA via HHA and mobilized into the bathroom with the same level of assistance. Max A was provided for brief management due to it being saturated. CGA/Min A was provided for sinkside grooming and standing balance. The pt lost her balance backwards on several occasions causing safety concerns. OT met with the pt and her son at the end of the session - they are interested in the pt returning to her facility with HH. OT educated on her balance deficits and the need for more supervision/assistance for standing tasks and ADL's.

## 2025-07-03 NOTE — CONSULTS
"Neurology Follow-Up   [facility]     Neurology Follow-Up Day 2  Primary Diagnosis: Lacunar Stroke     Subjective          Patient states she is doing well today, she still has weakness on her left side. Speech is better today. Denies any new symptoms.     Objective        Exam     /75 (BP Location: Left arm, Patient Position: Sitting)   Pulse 87   Temp 98.1 °F (36.7 °C) (Oral)   Resp 18   Ht 162.6 cm (64\")   Wt 57.2 kg (126 lb)   LMP  (LMP Unknown)   SpO2 98%   BMI 21.63 kg/m²   Gen: NAD, vitals reviewed  MS: oriented x3, recent/remote memory intact, dementia at baseline, normal attention/concentration, language intact, no neglect.  CN: visual acuity grossly normal, PERRL, EOMI, mild facial droop, mild dysarthria  Motor: 5/5 throughout upper and lower extremities, normal tone  Sensory: intact to light touch all 4 ext.    NIH Stroke Scale  Time: 10:30 AM  Person Administering Scale: DIANNE Otto    1a  Level of consciousness: 0=alert; keenly responsive   1b. LOC questions:  0=Performs both tasks correctly   1c. LOC commands: 0=Performs both tasks correctly   2.  Best Gaze: 0=normal   3.  Visual: 0=No visual loss   4. Facial Palsy: 2=Partial paralysis (total or near total paralysis of the lower face)   5a.  Motor left arm: 0=No drift, limb holds 90 (or 45) degrees for full 10 seconds   5b.  Motor right arm: 0=No drift, limb holds 90 (or 45) degrees for full 10 seconds   6a. motor left le=No drift, limb holds 90 (or 45) degrees for full 10 seconds   6b  Motor right le=No drift, limb holds 90 (or 45) degrees for full 10 seconds   7. Limb Ataxia: 0=Absent   8.  Sensory: 0=Normal; no sensory loss   9. Best Language:  0=No aphasia, normal   10. Dysarthria: 1=Mild to moderate, patient slurs at least some words and at worst, can be understood with some difficulty   11. 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    Osteoarthritis, chronic     minimal sx controlled with exercise    Osteoporosis     Pancreatitis due to biliary obstruction 2013    relieved after lap lacho    Patient had no falls in past year     Postpartum depression     Prediabetes     Reaction, adjustment, with anxious, depressed mood     Stroke     Vertigo 9/11/2018    Recurrent, suspect BPPV     Past Surgical History:   Procedure Laterality Date    CATARACT EXTRACTION      CATARACT EXTRACTION BILATERAL W/ ANTERIOR VITRECTOMY  2009    CHOLECYSTECTOMY  2013    DILATION AND CURETTAGE, DIAGNOSTIC / THERAPEUTIC  1986    EYE PTOSIS REPAIR Bilateral 2015    REFRACTIVE SURGERY      2015 L and 2016 R    TEAR DUCT SURGERY      2007 R and 2014 L; Dr. Davis    TONSILLECTOMY  1957       Allergies     Allergies:  Allergies   Allergen Reactions    Cefaclor GI Intolerance    Cephalexin GI Intolerance    Levofloxacin Unknown - Low Severity    Penicillins Unknown - Low Severity     Social Hx:  Social History     Socioeconomic History    Marital status:     Number of children: 2   Tobacco Use    Smoking status: Never     Passive exposure: Never    Smokeless tobacco: Never   Vaping Use    Vaping status: Never Used   Substance and Sexual Activity    Alcohol use: Not Currently     Comment: quit in 2018    Drug use: No    Sexual activity: Never     Partners: Male     Comment:  only       Family Hx:  Family History   Problem Relation Age of Onset    Diabetes Mother     Heart disease Mother     Depression Father     Parkinsonism Father     Diabetes  Sister     Alzheimer's disease Brother     Diabetes Brother     Diabetes Maternal Grandmother     Diabetes Brother     Prostate cancer Brother     Diabetes Sister     No Known Problems Daughter     No Known Problems Son        Medications        Medications On Admission  Medications Prior to Admission   Medication Sig Dispense Refill Last Dose/Taking    aspirin 81 MG EC tablet Take 1 tablet by mouth Daily. 30 tablet 0 7/1/2025 Evening    atorvastatin (LIPITOR) 20 MG tablet TAKE ONE TABLET BY MOUTH DAILY 90 tablet 0 7/1/2025    Calcium Citrate-Vitamin D3 (CITRACAL) 315-6.25 MG-MCG tablet tablet Take 2 tablets by mouth 2 (Two) Times a Day.   7/1/2025 Evening    Cholecalciferol (VITAMIN D3) 75 MCG (3000 UT) tablet Take 3,000 Units by mouth Daily.   7/1/2025 Evening    docusate sodium (COLACE) 100 MG capsule Take 3 capsules by mouth Every Night.   7/1/2025    escitalopram (LEXAPRO) 10 MG tablet TAKE ONE TABLET BY MOUTH DAILY 90 tablet 1 7/1/2025    Magnesium 250 MG tablet Take 1 tablet by mouth Every Night.   7/1/2025 Evening    metFORMIN ER (GLUCOPHAGE-XR) 500 MG 24 hr tablet Take 1 tablet by mouth Daily With Breakfast. 90 tablet 0 7/1/2025    vitamin B-12 (CYANOCOBALAMIN) 1000 MCG tablet Take 1 tablet by mouth Daily. 30 tablet 5 7/1/2025 Evening    cycloSPORINE (RESTASIS) 0.05 % ophthalmic emulsion Administer 1 drop to both eyes 2 (Two) Times a Day.   Unknown    denosumab (PROLIA) 60 MG/ML solution syringe Inject 1 mL under the skin into the appropriate area as directed Every 6 (Six) Months.   More than a month    Lancet Devices (ONE TOUCH DELICA LANCING DEV) misc 1 each Daily. DX: E11.9 TYPE 2 DM 1 each 1     Lancet Devices (OneTouch Delica Plus Lancing) misc 1 each Daily. USE ONETOUCH DELICA PLUS LANCING DEVICE ONCE DAILY FOR GLUCOSE MONITOR  DX: E11.9 1 each 11     OneTouch Ultra test strip USE ONE STRIP TO TEST DAILY 100 each 3              Lab Review      Lab Results   Component Value Date    GLUCOSE 165 (H)  07/03/2025    CALCIUM 9.0 07/03/2025     07/03/2025    K 4.1 07/03/2025    CO2 25.4 07/03/2025     07/03/2025    BUN 14.0 07/03/2025    CREATININE 0.66 07/03/2025    EGFRIFAFRI 92 01/15/2021    EGFRIFNONA 67 11/30/2021    BCR 21.2 07/03/2025    ANIONGAP 8.6 07/03/2025     Lab Results   Component Value Date    WBC 6.65 07/03/2025    HGB 13.4 07/03/2025    HCT 38.7 07/03/2025    MCV 88.4 07/03/2025     07/03/2025     Lab Results   Component Value Date     (H) 07/02/2025    LDL 58 07/11/2023    LDL 73 01/05/2023     Lab Results   Component Value Date    HGBA1C 7.10 (H) 07/02/2025     Lab Results   Component Value Date    INR 1.04 07/02/2025    INR 1.00 07/01/2025    INR 1.00 05/13/2019    PROTIME 13.6 07/02/2025    PROTIME 12.1 07/01/2025    PROTIME 12.9 05/13/2019           Data/Imaging   MRI Brain Without Contrast  Result Date: 7/3/2025  MRI OF THE BRAIN WITHOUT CONTRAST ON 07/02/2025  CLINICAL HISTORY: This is a 89-year-old female patient with acute strokelike symptoms with numbness and tingling on left side of her lip.  TECHNIQUE: Axial T1, FLAIR, fat-suppressed T2, axial diffusion and gradient echo T2 and sagittal T1-weighted images were obtained of the entire head.  This is correlated to a contrast-enhanced CT angiogram of the head and neck and CT perfusion study of the head yesterday evening on 07/01/2025 at 6:54 p.m. and a prior MRI of the brain on 05/13/2019.  FINDINGS: There are patchy nodular and confluent areas of T2 high signal throughout the periventricular and subcortical white matter of the cerebral hemispheres consistent with moderate-to-severe small vessel disease. There is a 15 x 10 mm chronic infarct in the superior medial left cerebellum in the left superior cerebellar artery territory that occurred back in 05/2019. There is a 10 x 5 mm old lateral right cerebellar infarct in the right PICA territory, 2 separate tiny 4-5 mm old posterior lateral left cerebellar infarcts in  the left PICA territory, unchanged since MRI 05/13/2019. There is a triangular area of intense increased signal intensity on the diffusion-weighted images that measures 13 x 11 mm in anterior posterior and medial lateral dimension. It extends from the mid right corona radiata region into the posterior right putamen and involves the lateral fibers of the posterior limb of the right internal capsule and the medial fibers of the right posterior external capsule and is consistent with an acute lacunar-type infarct. The remainder of the brain parenchyma is normal in signal intensity. There is diffuse cerebral atrophy. The lateral and third ventricles are prominent in size, felt to be due to central volume loss or atrophy. I see no mass effect and no midline shift and no extra-axial fluid collections are identified. The calvarium and skull base are normal in appearance. There are lens implants in the globes from previous cataract surgery. Otherwise, the orbits are normal in appearance. The paranasal sinuses and the mastoid air cells and the middle ear cavities are clear.      1. There is an acute lacunar-type infarct measuring 13 x 11 mm in anterior posterior and medial lateral dimensions that extends from the mid right corona radiata region into the posterior right putamen and it involves the lateral fibers of the posterior limb of the right internal capsule and the medial fibers of the posterior right external capsule and likely accounts for the patient's acute left-sided symptoms.  2. The remainder of the MRI of the brain is unchanged when compared to MRI of the brain 05/13/2019. There is moderate-to-severe small vessel disease in the cerebral white matter. There is a 15 x 10 mm chronic infarct in the superior medial left cerebellum in the left superior cerebellar artery territory that occurred back in 05/2019. There is a 10 x 5 mm old lateral right cerebellar infarct in the right PICA territory and 2 separate tiny 4-5  mm old posterior lateral left cerebellar infarcts in the left PICA territory. There is diffuse cerebral atrophy.  The results of this study were communicated Dr. Hernandez from Stroke Neurology by telephone on 07/02/2025 at 7:40 a.m.  This report was finalized on 7/3/2025 7:02 AM by Dr. Norman Garcia M.D on Workstation: [id number]      Adult transthoracic echo complete  Result Date: 7/2/2025    Left ventricular systolic function is normal. Calculated left ventricular EF = 60.5%   Left ventricular diastolic function is consistent with (grade I) impaired relaxation.   There is bileaflet mitral valve thickening present.   Moderate mitral valve regurgitation is present.   Compared to echocardiogram 5/14/2019 the mitral regurgitation is worse.     XR Chest 1 View  Result Date: 7/1/2025  XR CHEST 1 VW-  Clinical: Acute stroke protocol  COMPARISON examination 7/31/2020  FINDINGS: There is cardiomegaly. Diffuse increase in the overall interstitial pattern most consistent with vascular congestion, diffuse atypical pneumonia possible. Mediastinum is satisfactory in appearance. There is a vague area of opacity demonstrated at the left lung base, superimposed consolidation or pleural effusion. Favor the latter. The remainder is unremarkable.  This report was finalized on 7/1/2025 8:27 PM by Dr. Alexis Crawley M.D on Workstation: [id number]      CT Angiogram Head w AI Analysis of LVO  Result Date: 7/1/2025  HEAD CT WITHOUT CONTRAST, HEAD & NECK CTA WITH CONTRAST, PRE 7 POST CONTRAST HEAD PERFUSION CT  INDICATION: Slurred speech and difficulty swallowing.  TECHNIQUE: Unenhanced head CT, followed by CT angiogram of the head and neck with IV contrast. 3-D postprocessing was performed and reviewed.  Evaluation for a significant carotid arterial stenosis is based on the NASCET criteria. Axial CT images of the brain without and with IV contrast using cerebral perfusion protocol. Post-processing parametric maps were created using  RAPID software and reviewed.  Radiation dose reduction techniques were utilized, including automated exposure control, and exposure modulation based on body size.  COMPARISON: Head and neck CT angiogram 5/13/2019  FINDINGS:  Head CT: There is volume loss, but there is no CT evidence of acute intracranial hemorrhage, mass, or infarct. There is no hydrocephalus or extra-axial fluid collection.  There are moderately extensive senescent changes, but there is no evidence of acute intracranial abnormality.  CTA neck: There is a normal aortic arch branching pattern without proximal great vessel stenosis. Both vertebral arteries are patent throughout the neck, and supply the basilar artery.  Both common carotid arteries are normally patent. There is plaque at both cervical carotid bifurcations, with 0% stenosis in both internal carotid arteries.  CTA head:  There is symmetric distal intracranial runoff in the anterior, middle, and posterior cerebral artery territories.  There is no evidence of intracranial aneurysm, or of high-grade intracranial flow-limiting stenosis.  There is no evidence of branch vessel occlusion, or region or zone of hypoperfusion.  The dural venous sinuses appear normal.  Extravascular structures: There is no intracranial mass or abnormal enhancement.   The extracranial and cervical soft tissue structures show no acute abnormality.  The visualized lung apices show no acute abnormality.  There are cervical spinal degenerative changes, but there is no acute bony abnormality.  Ischemic tissue volume (Tmax > 6 s): 0 mL Ischemic core volume (rCBF < 30%): 0 mL Mismatch (penumbra) volume 0 mL, ratio N/A Volume of poor collateral perfusion (Tmax > 10 s): 0 mL Hypoperfusion index (Tmax > 10 s /Tmax > 6 s): N/A CBV index: N/A       Chronic changes as noted above, no acute intracranial abnormality.  Neck CTA shows 0% stenosis in both internal carotid arteries and patent vertebral arteries bilaterally.  Head CTA  shows no evidence of acute intracranial vascular abnormality.  Normal, symmetric cerebral perfusion. No evidence of acute infarct or ischemia.  This report was finalized on 7/1/2025 7:47 PM by Dr. Brandon Marin M.D on Workstation: [id number]      CT Angiogram Neck  Result Date: 7/1/2025  HEAD CT WITHOUT CONTRAST, HEAD & NECK CTA WITH CONTRAST, PRE 7 POST CONTRAST HEAD PERFUSION CT  INDICATION: Slurred speech and difficulty swallowing.  TECHNIQUE: Unenhanced head CT, followed by CT angiogram of the head and neck with IV contrast. 3-D postprocessing was performed and reviewed.  Evaluation for a significant carotid arterial stenosis is based on the NASCET criteria. Axial CT images of the brain without and with IV contrast using cerebral perfusion protocol. Post-processing parametric maps were created using RAPID software and reviewed.  Radiation dose reduction techniques were utilized, including automated exposure control, and exposure modulation based on body size.  COMPARISON: Head and neck CT angiogram 5/13/2019  FINDINGS:  Head CT: There is volume loss, but there is no CT evidence of acute intracranial hemorrhage, mass, or infarct. There is no hydrocephalus or extra-axial fluid collection.  There are moderately extensive senescent changes, but there is no evidence of acute intracranial abnormality.  CTA neck: There is a normal aortic arch branching pattern without proximal great vessel stenosis. Both vertebral arteries are patent throughout the neck, and supply the basilar artery.  Both common carotid arteries are normally patent. There is plaque at both cervical carotid bifurcations, with 0% stenosis in both internal carotid arteries.  CTA head:  There is symmetric distal intracranial runoff in the anterior, middle, and posterior cerebral artery territories.  There is no evidence of intracranial aneurysm, or of high-grade intracranial flow-limiting stenosis.  There is no evidence of branch vessel occlusion,  or region or zone of hypoperfusion.  The dural venous sinuses appear normal.  Extravascular structures: There is no intracranial mass or abnormal enhancement.   The extracranial and cervical soft tissue structures show no acute abnormality.  The visualized lung apices show no acute abnormality.  There are cervical spinal degenerative changes, but there is no acute bony abnormality.  Ischemic tissue volume (Tmax > 6 s): 0 mL Ischemic core volume (rCBF < 30%): 0 mL Mismatch (penumbra) volume 0 mL, ratio N/A Volume of poor collateral perfusion (Tmax > 10 s): 0 mL Hypoperfusion index (Tmax > 10 s /Tmax > 6 s): N/A CBV index: N/A       Chronic changes as noted above, no acute intracranial abnormality.  Neck CTA shows 0% stenosis in both internal carotid arteries and patent vertebral arteries bilaterally.  Head CTA shows no evidence of acute intracranial vascular abnormality.  Normal, symmetric cerebral perfusion. No evidence of acute infarct or ischemia.  This report was finalized on 7/1/2025 7:47 PM by Dr. Brandon Marin M.D on Workstation: [id number]      CT CEREBRAL PERFUSION WITH & WITHOUT CONTRAST  Result Date: 7/1/2025  HEAD CT WITHOUT CONTRAST, HEAD & NECK CTA WITH CONTRAST, PRE 7 POST CONTRAST HEAD PERFUSION CT  INDICATION: Slurred speech and difficulty swallowing.  TECHNIQUE: Unenhanced head CT, followed by CT angiogram of the head and neck with IV contrast. 3-D postprocessing was performed and reviewed.  Evaluation for a significant carotid arterial stenosis is based on the NASCET criteria. Axial CT images of the brain without and with IV contrast using cerebral perfusion protocol. Post-processing parametric maps were created using RAPID software and reviewed.  Radiation dose reduction techniques were utilized, including automated exposure control, and exposure modulation based on body size.  COMPARISON: Head and neck CT angiogram 5/13/2019  FINDINGS:  Head CT: There is volume loss, but there is no CT  evidence of acute intracranial hemorrhage, mass, or infarct. There is no hydrocephalus or extra-axial fluid collection.  There are moderately extensive senescent changes, but there is no evidence of acute intracranial abnormality.  CTA neck: There is a normal aortic arch branching pattern without proximal great vessel stenosis. Both vertebral arteries are patent throughout the neck, and supply the basilar artery.  Both common carotid arteries are normally patent. There is plaque at both cervical carotid bifurcations, with 0% stenosis in both internal carotid arteries.  CTA head:  There is symmetric distal intracranial runoff in the anterior, middle, and posterior cerebral artery territories.  There is no evidence of intracranial aneurysm, or of high-grade intracranial flow-limiting stenosis.  There is no evidence of branch vessel occlusion, or region or zone of hypoperfusion.  The dural venous sinuses appear normal.  Extravascular structures: There is no intracranial mass or abnormal enhancement.   The extracranial and cervical soft tissue structures show no acute abnormality.  The visualized lung apices show no acute abnormality.  There are cervical spinal degenerative changes, but there is no acute bony abnormality.  Ischemic tissue volume (Tmax > 6 s): 0 mL Ischemic core volume (rCBF < 30%): 0 mL Mismatch (penumbra) volume 0 mL, ratio N/A Volume of poor collateral perfusion (Tmax > 10 s): 0 mL Hypoperfusion index (Tmax > 10 s /Tmax > 6 s): N/A CBV index: N/A       Chronic changes as noted above, no acute intracranial abnormality.  Neck CTA shows 0% stenosis in both internal carotid arteries and patent vertebral arteries bilaterally.  Head CTA shows no evidence of acute intracranial vascular abnormality.  Normal, symmetric cerebral perfusion. No evidence of acute infarct or ischemia.  This report was finalized on 7/1/2025 7:47 PM by Dr. Brandon Marin M.D on Workstation: [id number]         Diagnosis        Lacunar Stroke   Small Vessel Disease  Hyperlipidemia     Assessment/Plan      Cristy Wilde is a 89 y.o. female, who presented to [facility] ED on 7/1/25 with dysarthria and left-sided facial droop, that started that morning.  CTA of head and neck, CT Cerebral perfusion with team D imaging and no significant vascular stenosis or perfusion deficit.  Since MRI brain has been completed and shows an acute right corona radiata stroke consistent with small vessel etiology. Continue with therapies, dual antiplatelet therapy for 21 days, and high intensity statin.      Plan     Asa  Plavix x 21 days   high intensity statin 40 mg of Lipitor  Serial neuroexams  Stroke education  A1c goal <6.5%  LDL <70  TEDs/SCDs  Therapies as written    Will sign off      DIANNE Otto

## 2025-07-03 NOTE — OUTREACH NOTE
Prep Survey      Flowsheet Row Responses   [facility] patient discharged from? [facility]   Is LACE score < 7 ? Yes   Eligibility Readm Mgmt   Discharge diagnosis Acute CVA   Does the patient have one of the following disease processes/diagnoses(primary or secondary)? Stroke   Does the patient have Home health ordered? Yes   What is the Home health agency?  [facility]   Is there a DME ordered? No   Prep survey completed? Yes            ELENA QUAN - Registered Nurse

## 2025-07-03 NOTE — PLAN OF CARE
Goal Outcome Evaluation:  Plan of Care Reviewed With: patient, child           Outcome Evaluation: Pt is a 89 yo F who was admitted from [facility] with dysarthria and L facial droop. Pt presents to PT with some forgetfulness, mild L UE weakness, and complaints of generalized weakness. Pt reports she has been having difficulty with sit to stand and getting out of bed at times. Pt may benefit from skilled PT to address strength, mobility, and gait. PT is recommending HHPT to address pt's complaints of weakness and functional decline.    Anticipated Discharge Disposition (PT): home with home health, home

## 2025-07-03 NOTE — PLAN OF CARE
Problem: Adult Inpatient Plan of Care  Goal: Absence of Hospital-Acquired Illness or Injury  Intervention: Identify and Manage Fall Risk  Recent Flowsheet Documentation  Taken 7/3/2025 0602 by Amarilis Sheikh RN  Safety Promotion/Fall Prevention:   nonskid shoes/slippers when out of bed   safety round/check completed   room organization consistent  Taken 7/3/2025 0413 by Amarilis Sheikh RN  Safety Promotion/Fall Prevention:   activity supervised   clutter free environment maintained   fall prevention program maintained   lighting adjusted   nonskid shoes/slippers when out of bed   room organization consistent   safety round/check completed  Taken 7/3/2025 0215 by Amarilis Sheikh RN  Safety Promotion/Fall Prevention:   activity supervised   clutter free environment maintained   fall prevention program maintained   lighting adjusted   nonskid shoes/slippers when out of bed   room organization consistent   safety round/check completed  Taken 7/3/2025 0010 by Amarilis Sheikh RN  Safety Promotion/Fall Prevention:   activity supervised   assistive device/personal items within reach   fall prevention program maintained   clutter free environment maintained   nonskid shoes/slippers when out of bed   room organization consistent   safety round/check completed  Taken 7/2/2025 2205 by Amarilis Sheikh RN  Safety Promotion/Fall Prevention:   activity supervised   clutter free environment maintained   fall prevention program maintained   lighting adjusted   nonskid shoes/slippers when out of bed   safety round/check completed   room organization consistent  Taken 7/2/2025 1935 by Amarilis Sheikh RN  Safety Promotion/Fall Prevention:   activity supervised   clutter free environment maintained   fall prevention program maintained   lighting adjusted   nonskid shoes/slippers when out of bed   room organization consistent   safety round/check completed  Intervention: Prevent Skin Injury  Recent Flowsheet Documentation  Taken 7/3/2025 0413 by  Amarilis Sheikh RN  Body Position: position changed independently  Taken 7/3/2025 0215 by Amarilis Sheikh RN  Body Position: position changed independently  Taken 7/3/2025 0010 by Amarilis Sheikh RN  Body Position: position changed independently  Skin Protection: incontinence pads utilized  Taken 7/2/2025 2205 by Amarilis Sheikh RN  Body Position:   position changed independently   turned  Taken 7/2/2025 1935 by Amarilis Sheikh RN  Body Position: position changed independently  Skin Protection: incontinence pads utilized  Intervention: Prevent and Manage VTE (Venous Thromboembolism) Risk  Recent Flowsheet Documentation  Taken 7/3/2025 0010 by Amarilis Sheikh RN  VTE Prevention/Management:   bilateral   SCDs (sequential compression devices) on  Taken 7/2/2025 1935 by Amarilis Sheikh RN  VTE Prevention/Management:   bilateral   SCDs (sequential compression devices) on  Intervention: Prevent Infection  Recent Flowsheet Documentation  Taken 7/3/2025 0602 by Amarilis Sheikh RN  Infection Prevention:   rest/sleep promoted   single patient room provided  Taken 7/3/2025 0413 by Amarilis Sheikh RN  Infection Prevention: rest/sleep promoted  Taken 7/3/2025 0215 by Amarilis Sheikh RN  Infection Prevention:   rest/sleep promoted   single patient room provided  Taken 7/3/2025 0010 by Amarilis Sheikh RN  Infection Prevention:   single patient room provided   rest/sleep promoted  Taken 7/2/2025 2205 by Amarilis Sheikh RN  Infection Prevention:   rest/sleep promoted   single patient room provided  Taken 7/2/2025 1935 by Amarilis Sheikh RN  Infection Prevention:   single patient room provided   rest/sleep promoted     Problem: Adult Inpatient Plan of Care  Goal: Absence of Hospital-Acquired Illness or Injury  Intervention: Prevent Infection  Recent Flowsheet Documentation  Taken 7/3/2025 0602 by Amarilis Sheikh RN  Infection Prevention:   rest/sleep promoted   single patient room provided  Taken 7/3/2025 0413 by Amarilis Sheikh RN  Infection Prevention:  rest/sleep promoted  Taken 7/3/2025 0215 by Amarilis Sheikh RN  Infection Prevention:   rest/sleep promoted   single patient room provided  Taken 7/3/2025 0010 by Amarilis Sheikh RN  Infection Prevention:   single patient room provided   rest/sleep promoted  Taken 7/2/2025 2205 by Amarilis Sheikh RN  Infection Prevention:   rest/sleep promoted   single patient room provided  Taken 7/2/2025 1935 by Amarilis Sheikh RN  Infection Prevention:   single patient room provided   rest/sleep promoted     Problem: Stroke, Ischemic (Includes Transient Ischemic Attack)  Goal: Safe and Effective Swallow  Intervention: Optimize Eating and Swallowing  Recent Flowsheet Documentation  Taken 7/3/2025 0602 by Amarilis Sheikh RN  Aspiration Precautions: awake/alert before oral intake  Taken 7/3/2025 0413 by Amarilis Sheikh RN  Aspiration Precautions: awake/alert before oral intake  Taken 7/3/2025 0215 by Amarilis Sheikh RN  Aspiration Precautions: awake/alert before oral intake  Taken 7/3/2025 0010 by Amarilis Sheikh RN  Aspiration Precautions: awake/alert before oral intake     Problem: Fall Injury Risk  Goal: Absence of Fall and Fall-Related Injury  Intervention: Identify and Manage Contributors  Recent Flowsheet Documentation  Taken 7/3/2025 0602 by Amarilis Sheikh RN  Medication Review/Management: medications reviewed  Self-Care Promotion: independence encouraged  Taken 7/3/2025 0413 by Amarilis Sheikh RN  Medication Review/Management: medications reviewed  Self-Care Promotion: independence encouraged  Taken 7/3/2025 0215 by Amarilis Sheikh RN  Medication Review/Management: medications reviewed  Self-Care Promotion: independence encouraged  Taken 7/3/2025 0010 by Amarilis Sheikh RN  Medication Review/Management: medications reviewed  Taken 7/2/2025 2205 by Amarilis Sheikh RN  Medication Review/Management: medications reviewed     Problem: Fall Injury Risk  Goal: Absence of Fall and Fall-Related Injury  Intervention: Promote Injury-Free Environment  Recent  Flowsheet Documentation  Taken 7/3/2025 0602 by Amarilis Sheikh, LAURENCE  Safety Promotion/Fall Prevention:   nonskid shoes/slippers when out of bed   safety round/check completed   room organization consistent  Taken 7/3/2025 0413 by Amarilis Sheikh RN  Safety Promotion/Fall Prevention:   activity supervised   clutter free environment maintained   fall prevention program maintained   lighting adjusted   nonskid shoes/slippers when out of bed   room organization consistent   safety round/check completed  Taken 7/3/2025 0215 by Amarilis Sheikh RN  Safety Promotion/Fall Prevention:   activity supervised   clutter free environment maintained   fall prevention program maintained   lighting adjusted   nonskid shoes/slippers when out of bed   room organization consistent   safety round/check completed  Taken 7/3/2025 0010 by Amarilis Sheikh RN  Safety Promotion/Fall Prevention:   activity supervised   assistive device/personal items within reach   fall prevention program maintained   clutter free environment maintained   nonskid shoes/slippers when out of bed   room organization consistent   safety round/check completed  Taken 7/2/2025 2205 by Amarilis Sheikh RN  Safety Promotion/Fall Prevention:   activity supervised   clutter free environment maintained   fall prevention program maintained   lighting adjusted   nonskid shoes/slippers when out of bed   safety round/check completed   room organization consistent  Taken 7/2/2025 1935 by Amarilis Sheikh RN  Safety Promotion/Fall Prevention:   activity supervised   clutter free environment maintained   fall prevention program maintained   lighting adjusted   nonskid shoes/slippers when out of bed   room organization consistent   safety round/check completed   Goal Outcome Evaluation:

## 2025-07-03 NOTE — PLAN OF CARE
Goal Outcome Evaluation:                    Dc plan reviewed with pt and son.

## 2025-07-03 NOTE — THERAPY EVALUATION
Patient Name: Cristy Wilde  : 1934    MRN: [id number]                              Today's Date: 7/3/2025       Admit Date: 2025    Visit Dx:     ICD-10-CM ICD-9-CM   1. Slurred speech  R47.81 784.59   2. Facial droop  R29.810 781.94   3. Left facial numbness  R20.0 782.0   4. Cerebrovascular accident (CVA), unspecified mechanism  I63.9 434.91   5. Generalized weakness  R53.1 780.79     Patient Active Problem List   Diagnosis    Chronic rhinitis    Dry eyes    Hearing loss    Osteoporosis    Adjustment disorder with mixed anxiety and depressed mood    Onychomycosis    Controlled type 2 diabetes mellitus without complication, without long-term current use of insulin    Pruritus    Stress incontinence in female    Xerosis of skin    Chronic midline low back pain without sciatica    Osteoporosis    Cognitive dysfunction    History of stroke    Stroke    Dysarthria    Facial droop    Anxiety and depression     Past Medical History:   Diagnosis Date    Chronic constipation     Chronic rhinitis     with noted vasomotor rhinitis    Diabetes mellitus     Diverticulosis     Dry eyes     sees dr norton    Globus sensation     Hearing loss     noted on  audiology testing    History of allergic rhinitis     History of onychomycosis     Internal hemorrhoids     grade 2    Laryngitis, chronic     New infarction of cerebellum 2019    Osteoarthritis, chronic     minimal sx controlled with exercise    Osteoporosis     Pancreatitis due to biliary obstruction     relieved after lap lacho    Patient had no falls in past year     Postpartum depression     Prediabetes     Reaction, adjustment, with anxious, depressed mood     Stroke     Vertigo 2018    Recurrent, suspect BPPV     Past Surgical History:   Procedure Laterality Date    CATARACT EXTRACTION      CATARACT EXTRACTION BILATERAL W/ ANTERIOR VITRECTOMY  2009    CHOLECYSTECTOMY      DILATION AND CURETTAGE, DIAGNOSTIC / THERAPEUTIC  [facility]     EYE PTOSIS REPAIR Bilateral 2015    REFRACTIVE SURGERY      2015 L and 2016 R    TEAR DUCT SURGERY      2007 R and 2014 L; Dr. Davis    TONSILLECTOMY  1957      General Information       Row Name 07/03/25 1501          OT Time and Intention    Subjective Information no complaints  -RB     Document Type evaluation  -RB     Mode of Treatment individual therapy;occupational therapy  -RB     Patient Effort good  -RB       Row Name 07/03/25 1501          General Information    Patient Profile Reviewed yes  -RB     Prior Level of Function independent:;ADL's;transfer  -RB     Existing Precautions/Restrictions fall  -RB     Barriers to Rehab medically complex;cognitive status  -RB       Row Name 07/03/25 1501          Living Environment    Current Living Arrangements independent living facility  -RB     People in Home alone  -RB       Row Name 07/03/25 1501          Home Main Entrance    Number of Stairs, Main Entrance none  -RB     Stair Railings, Main Entrance none  -RB       Row Name 07/03/25 1501          Cognition    Orientation Status (Cognition) oriented to;person;place;verbal cues/prompts needed for orientation  -RB       Row Name 07/03/25 1501          Safety Issues/Impairments Affecting Functional Mobility    Safety Issues Affecting Function (Mobility) safety precaution awareness;safety precautions follow-through/compliance;insight into deficits/self-awareness;awareness of need for assistance;judgment  -RB     Impairments Affecting Function (Mobility) balance;strength;cognition  -RB               User Key  (r) = Recorded By, (t) = Taken By, (c) = Cosigned By      Initials Name Provider Type    RB Parul Bledsoe, OTR/L, CSRS Occupational Therapist                     Mobility/ADL's       Row Name 07/03/25 1502          Bed Mobility    Comment, (Bed Mobility) Sitting in her bedside chair pre and post session  -RB       Row Name 07/03/25 1502          Transfers    Transfers sit-stand transfer;stand-sit  transfer;toilet transfer  -RB       Row Name 07/03/25 1502          Sit-Stand Transfer    Sit-Stand [place] (Transfers) contact guard;verbal cues  -RB     Comment, (Sit-Stand Transfer) a  -RB       Row Name 07/03/25 1502          Stand-Sit Transfer    Stand-Sit [place] (Transfers) contact guard;verbal cues  -RB     Comment, (Stand-Sit Transfer) [facility]  -RB       Row Name 07/03/25 1502          Toilet Transfer    Type (Toilet Transfer) sit-stand;stand-sit  -RB     [place] Level (Toilet Transfer) minimum assist (75% patient effort);contact guard;verbal cues  -RB     Assistive Device (Toilet Transfer) grab bars/safety frame  -RB     Comment, (Toilet Transfer) [facility]  -RB       Row Name 07/03/25 1502          Functional Mobility    Functional Mobility- Ind. Level contact guard assist;minimum assist (75% patient effort);1 person;verbal cues required  -RB     Functional Mobility- Safety Issues loses balance backward  -RB     Functional Mobility- Comment hha  -RB       Row Name 07/03/25 1502          Activities of Daily Living    BADL Assessment/Intervention grooming;toileting  -RB       Row Name 07/03/25 1502          Grooming Assessment/Training    [place] Level (Grooming) grooming skills;contact guard assist;minimum assist (75% patient effort)  -RB     Position (Grooming) sink side;supported standing  -RB     Comment, (Grooming) loss of balance backwards when using BUE in a functional task and turning her head to visual scan  -RB       Row Name 07/03/25 1502          Toileting Assessment/Training    [place] Level (Toileting) toileting skills;maximum assist (25% patient effort);change pad/brief  -RB     Position (Toileting) supported sitting;supported standing  -RB               User Key  (r) = Recorded By, (t) = Taken By, (c) = Cosigned By      Initials Name Provider Type    RB Parul Bledsoe, LIO/L, CSRS Occupational Therapist                   Obj/Interventions       Row Name 07/03/25 1507           Sensory Assessment (Somatosensory)    Sensory Assessment (Somatosensory) sensation intact  -       Row Name 07/03/25 1504          Vision Assessment/Intervention    Visual Impairment/Limitations WFL  -[facility] Name 07/03/25 1504          Range of Motion Comprehensive    General Range of Motion no range of motion deficits identified  -[facility] Name 07/03/25 1504          Strength Comprehensive (MMT)    Comment, General Manual Muscle Testing (MMT) Assessment No UE L vs R weakness noticed on exam  -[facility] Name 07/03/25 1504          Balance    Comment, Balance CGA/Min A due to her general unsteadiness and LOB backwards at sinkside  -               User Key  (r) = Recorded By, (t) = Taken By, (c) = Cosigned By      Initials Name Provider Type    RB Parul Bledsoe, OTR/L, CSRS Occupational Therapist                   Goals/Plan       [facility] Name 07/03/25 1506          Transfer Goal 1 (OT)    Activity/Assistive Device (Transfer Goal 1, OT) transfers, all  -RB     [place] Level/Cues Needed (Transfer Goal 1, OT) standby assist  -RB     Time Frame (Transfer Goal 1, OT) short term goal (STG);2 weeks  -RB     Progress/Outcome (Transfer Goal 1, OT) new goal  -RB       [facility] Name 07/03/25 1506          Dressing Goal 1 (OT)    Activity/Device (Dressing Goal 1, OT) dressing skills, all  -RB     [place]/Cues Needed (Dressing Goal 1, OT) standby assist  -RB     Time Frame (Dressing Goal 1, OT) short term goal (STG);2 weeks  -RB     Progress/Outcome (Dressing Goal 1, OT) new goal  -RB       [facility] Name 07/03/25 1506          Toileting Goal 1 (OT)    Activity/Device (Toileting Goal 1, OT) toileting skills, all  -RB     [place] Level/Cues Needed (Toileting Goal 1, OT) standby assist  -RB     Time Frame (Toileting Goal 1, OT) short term goal (STG);2 weeks  -RB     Progress/Outcome (Toileting Goal 1, OT) new goal  -[facility] Name 07/03/25 1506          Grooming Goal 1 (OT)    Activity/Device (Grooming  Goal 1, OT) grooming skills, all  -RB     [place] (Grooming Goal 1, OT) standby assist  -RB     Time Frame (Grooming Goal 1, OT) 2 weeks;short term goal (STG)  -RB     Progress/Outcome (Grooming Goal 1, OT) new goal  -RB       Row Name 07/03/25 1506          Self-Feeding Goal 1 (OT)    Activity/Device (Self-Feeding Goal 1, OT) self-feeding skills, all  -RB     [place] Level/Cues Needed (Self-Feeding Goal 1, OT) standby assist  -RB     Time Frame (Self-Feeding Goal 1, OT) short term goal (STG);2 weeks  -RB     Progress/Outcomes (Self-Feeding Goal 1, OT) new goal  -RB       Row Name 07/03/25 1506          Therapy Assessment/Plan (OT)    Planned Therapy Interventions (OT) BADL retraining;functional balance retraining;occupation/activity based interventions;strengthening exercise;patient/caregiver education/training;neuromuscular control/coordination retraining;cognitive/visual perception retraining  -RB               User Key  (r) = Recorded By, (t) = Taken By, (c) = Cosigned By      Initials Name Provider Type    RB Parul Bledsoe, OTR/L, CSRS Occupational Therapist                   Clinical Impression       Row Name 07/03/25 1505          Pain Assessment    Pretreatment Pain Rating 0/10 - no pain  -RB     Posttreatment Pain Rating 0/10 - no pain  -RB       Row Name 07/03/25 1505          Plan of Care Review    Plan of Care Reviewed With patient  -RB     Progress no change  -RB     Outcome Evaluation The pt was admitted to [facility] 2/2 to dysarthria and left-sided facial droop.Dx includes acute right corona radiata stroke consistent with small vessel etiology. Pmhx significant for Alzheimer's, type 2 diabetes, hyperlipidemia, osteoporosis, and left cerebellar stroke in May 2019 without deficits. The pt resides in a [facility] where she uses both a cane and walker for mobility. She is independent with ADL's and mobility however staff is available at her facility as needed. The pt was oriented to herself and general  location only. She was sitting in her bedside chair. MMT strength testing equal, small facial droop noticed. She stood with CGA via HHA and mobilized into the bathroom with the same level of assistance. Max A was provided for brief management due to it being saturated. CGA/Min A was provided for sinkside grooming and standing balance. The pt lost her balance backwards on several occasions causing safety concerns. OT met with the pt and her son at the end of the session - they are interested in the pt returning to her facility with HH. OT educated on her balance deficits and the need for more supervision/assistance for standing tasks and ADL's.  -RB       Row Name 07/03/25 1506          Therapy Assessment/Plan (OT)    Rehab Potential (OT) good  -RB     Criteria for Skilled Therapeutic Interventions Met (OT) yes;skilled treatment is necessary  -RB     Therapy Frequency (OT) 5 times/wk  -RB       Row Name 07/03/25 1509          Therapy Plan Review/Discharge Plan (OT)    Anticipated Discharge Disposition (OT) --  IRF vs. home with 24/7 supervision is recommended - pt's family requesting to have her return to her ILF due to her baseline cognitive deficits  -RB       Row Name 07/03/25 150          Positioning and Restraints    Pre-Treatment Position sitting in chair/recliner  -RB     Post Treatment Position chair  -RB     In Chair notified nsg;reclined;sitting;call light within reach;encouraged to call for assist;exit alarm on;legs elevated;with family/caregiver  -RB               User Key  (r) = Recorded By, (t) = Taken By, (c) = Cosigned By      Initials Name Provider Type    RB Parul Bledsoe, MAICOLR/L, CSRS Occupational Therapist                   Outcome Measures       Row Name 07/03/25 9215          How much help from another is currently needed...    Putting on and taking off regular lower body clothing? 2  -RB     Bathing (including washing, rinsing, and drying) 2  -RB     Toileting (which includes using toilet  bed pan or urinal) 2  -RB     Putting on and taking off regular upper body clothing 2  -RB     Taking care of personal grooming (such as brushing teeth) 3  -RB     Eating meals 3  -RB     AM-PAC 6 Clicks Score (OT) 14  -RB       Row Name 07/03/25 1027          How much help from another person do you currently need...    Turning from your back to your side while in flat bed without using bedrails? 3  -CH     Moving from lying on back to sitting on the side of a flat bed without bedrails? 3  -CH     Moving to and from a bed to a chair (including a wheelchair)? 3  -CH     Standing up from a chair using your arms (e.g., wheelchair, bedside chair)? 3  -CH     Climbing 3-5 steps with a railing? 2  -CH     To walk in hospital room? 3  -CH     AM-PAC 6 Clicks Score (PT) 17  -CH     Highest Level of Mobility Goal Stand (1 or More Minutes)-5  -CH       Row Name 07/03/25 1506          Modified Ronnie Scale    Modified Assumption Scale 4 - Moderately severe disability.  Unable to walk without assistance, and unable to attend to own bodily needs without assistance.  -       Row Name 07/03/25 1506 07/03/25 1027       Functional Assessment    Outcome Measure Options AM-PAC 6 Clicks Daily Activity (OT);Modified Assumption  -RB AM-PAC 6 Clicks Basic Mobility (PT)  -              User Key  (r) = Recorded By, (t) = Taken By, (c) = Cosigned By      Initials Name Provider Type    CH Marii Jara, PT Physical Therapist    RB Parul Bledsoe, OTR/L, CSRS Occupational Therapist                      OT Recommendation and Plan  Planned Therapy Interventions (OT): BADL retraining, functional balance retraining, occupation/activity based interventions, strengthening exercise, patient/caregiver education/training, neuromuscular control/coordination retraining, cognitive/visual perception retraining  Therapy Frequency (OT): 5 times/wk  Plan of Care Review  Plan of Care Reviewed With: patient  Progress: no change  Outcome Evaluation: The pt  was admitted to [facility] 2/2 to dysarthria and left-sided facial droop.Dx includes acute right corona radiata stroke consistent with small vessel etiology. Pmhx significant for Alzheimer's, type 2 diabetes, hyperlipidemia, osteoporosis, and left cerebellar stroke in May 2019 without deficits. The pt resides in a ILF where she uses both a cane and walker for mobility. She is independent with ADL's and mobility however staff is available at her facility as needed. The pt was oriented to herself and general location only. She was sitting in her bedside chair. MMT strength testing equal, small facial droop noticed. She stood with CGA via HHA and mobilized into the bathroom with the same level of assistance. Max A was provided for brief management due to it being saturated. CGA/Min A was provided for sinkside grooming and standing balance. The pt lost her balance backwards on several occasions causing safety concerns. OT met with the pt and her son at the end of the session - they are interested in the pt returning to her facility with HH. OT educated on her balance deficits and the need for more supervision/assistance for standing tasks and ADL's.     Time Calculation:   Evaluation Complexity (OT)  Review Occupational Profile/Medical/Therapy History Complexity: brief/low complexity  Assessment, Occupational Performance/Identification of Deficit Complexity: 5 or more performance deficits  Clinical Decision Making Complexity (OT): problem focused assessment/low complexity  Overall Complexity of Evaluation (OT): low complexity     Time Calculation- OT       Row Name 07/03/25 1500             Time Calculation- OT    OT Start Time 1040  -RB      OT Stop Time 1105  -RB      OT Time Calculation (min) 25 min  -RB      Total Timed Code Minutes- OT 10 minute(s)  -RB      OT Received On 07/03/25  -RB      OT - Next Appointment 07/04/25  -RB      OT Goal Re-Cert Due Date 07/17/25  -RB         Timed Charges    44089 - OT Self Care/Mgmt  Minutes 10  -RB         Untimed Charges    OT Eval/Re-eval Minutes 15  -RB         Total Minutes    Timed Charges Total Minutes 10  -RB      Untimed Charges Total Minutes 15  -RB       Total Minutes 25  -RB                User Key  (r) = Recorded By, (t) = Taken By, (c) = Cosigned By      Initials Name Provider Type    RB Parul Bledsoe OTR/L, CSRS Occupational Therapist                  Therapy Charges for Today       Code Description Service Date Service Provider Modifiers Qty    22504460047 HC OT SELF CARE/MGMT/TRAIN EA 15 MIN 7/3/2025 Parul Bledsoe OTR/L, CSRS GO 1    [id number] HC OT EVAL LOW COMPLEXITY 3 7/3/2025 Parul Bledsoe OTR/L, CSRS GO 1                 LIO Mejia/L, CSRS  7/3/2025

## 2025-07-06 LAB
BH BB BLOOD EXPIRATION DATE: NORMAL
BH BB BLOOD EXPIRATION DATE: NORMAL
BH BB BLOOD TYPE BARCODE: 9500
BH BB BLOOD TYPE BARCODE: 9500
BH BB DISPENSE STATUS: NORMAL
BH BB DISPENSE STATUS: NORMAL
BH BB PRODUCT CODE: NORMAL
BH BB PRODUCT CODE: NORMAL
BH BB UNIT NUMBER: NORMAL
BH BB UNIT NUMBER: NORMAL
CROSSMATCH INTERPRETATION: NORMAL
CROSSMATCH INTERPRETATION: NORMAL
UNIT  ABO: NORMAL
UNIT  ABO: NORMAL
UNIT  RH: NORMAL
UNIT  RH: NORMAL

## 2025-07-09 NOTE — CASE MANAGEMENT/SOCIAL WORK
Case Management Discharge Note      Final Note: Home with [place]  via private vehicle    Provided Post Acute Provider List?: Yes  Post Acute Provider List: Home Health  Provided Post Acute Provider Quality & Resource List?: Yes  Post Acute Provider Quality and Resource List: Home Health  Delivered To: Patient, Support Person  Support Person: Leonides Wilde/son [phone]  Method of Delivery: In person    Selected Continued Care - Discharged on 7/3/2025 Admission date: 7/1/2025 - Discharge disposition: Home or Self Care      Destination    No services have been selected for the patient.                Durable Medical Equipment    No services have been selected for the patient.                Dialysis/Infusion    No services have been selected for the patient.                Home Medical Care Coordination complete.      Service Provider Services Address Phone Fax Patient Preferred    [facility] [address] [place] [id number] [phone] --              Therapy    No services have been selected for the patient.                Community Resources    No services have been selected for the patient.                Community & DME    No services have been selected for the patient.                    Transportation Services  Transportation: Private Transportation  Private: Car    Final Discharge Disposition Code: 06 - home with home health care

## 2025-07-10 ENCOUNTER — READMISSION MANAGEMENT (OUTPATIENT)
Dept: CALL CENTER | Facility: HOSPITAL | Age: OVER 89
End: 2025-07-10
Payer: MEDICARE

## 2025-07-10 NOTE — OUTREACH NOTE
Stroke Week 1 Survey      Flowsheet Row Responses   [facility] patient discharged from? [place]   Does the patient have one of the following disease processes/diagnoses(primary or secondary)? Stroke   Week 1 attempt successful? No   Unsuccessful attempts Attempt 1  [Reached son but he is not able to talk today. Call back another time.]            MA[address]NOR L[address] - Registered Nurse

## 2025-07-16 ENCOUNTER — READMISSION MANAGEMENT (OUTPATIENT)
Dept: CALL CENTER | Facility: HOSPITAL | Age: OVER 89
End: 2025-07-16
Payer: MEDICARE

## 2025-07-16 NOTE — OUTREACH NOTE
Stroke Week 1 Survey      Flowsheet Row Responses   [facility] [place] patient discharged from? [place]   Does the patient have one of the following disease processes/diagnoses(primary or secondary)? Stroke   Week 1 attempt successful? Yes   Call start time 0927   Call end time 0930   Discharge diagnosis Acute CVA   Person spoke with today (if not patient) and relationship Patient   Meds reviewed with patient/caregiver? Yes   Is the patient having any side effects they believe may be caused by any medication additions or changes? No   Does the patient have all medications ordered at discharge? Yes   Is the patient taking all medications as directed (includes completed medication regime)? Yes   Comments regarding appointments Patient reports her son handles scheduling all followups   Does the patient have a primary care provider?  Yes   Does the patient have an appointment with their PCP within 7 days of discharge? Yes   Has the patient kept scheduled appointments due by today? Yes   What is the Home health agency?  [facility]   Psychosocial issues? No   Does the patient have any residual symptoms from stroke/TIA? Yes   Residual symptoms comments Patient reports her speech is better, but her lips and face on one side has some numbness.   Did the patient receive a copy of their discharge instructions? Yes   Nursing interventions Reviewed instructions with patient   What is the patient's perception of their health status since discharge? Improving   Nursing interventions Nurse provided patient education   Is the patient/caregiver able to teach back signs and symptoms related to disease process for when to call PCP? Yes   Is the patient/caregiver able to teach back signs and symptoms related to disease process for when to call 911? Yes   If the patient is a current smoker, are they able to teach back resources for cessation? Not a smoker   Is the patient/caregiver able to teach back the hierarchy of  who to call/visit for symptoms/problems? PCP, Specialist, Home health nurse, Urgent Care, ED, 911 Yes   Is the patient able to teach back FAST for Stroke? B alance: Watch for sudden loss of balance, E yes: Check for vision loss, F ace: Look for an uneven smile, A rm: Check if one arm is weak, S peech: Listen for slurred speech, T kelly: Call 9-1-1 right away   Week 1 call completed? Yes   Revoked No further contact(revokes)-requires comment   Call end time [id number]            John PRECIADO - Registered Nurse